# Patient Record
Sex: FEMALE | Race: OTHER | HISPANIC OR LATINO | URBAN - METROPOLITAN AREA
[De-identification: names, ages, dates, MRNs, and addresses within clinical notes are randomized per-mention and may not be internally consistent; named-entity substitution may affect disease eponyms.]

---

## 2017-02-15 ENCOUNTER — INPATIENT (INPATIENT)
Facility: HOSPITAL | Age: 77
LOS: 0 days | Discharge: ANOTHER HOSPITAL | DRG: 313 | End: 2017-02-16
Attending: INTERNAL MEDICINE | Admitting: INTERNAL MEDICINE
Payer: COMMERCIAL

## 2017-02-15 VITALS
DIASTOLIC BLOOD PRESSURE: 78 MMHG | WEIGHT: 164.91 LBS | SYSTOLIC BLOOD PRESSURE: 115 MMHG | OXYGEN SATURATION: 97 % | TEMPERATURE: 98 F | HEART RATE: 88 BPM | RESPIRATION RATE: 16 BRPM | HEIGHT: 62 IN

## 2017-02-15 DIAGNOSIS — M79.7 FIBROMYALGIA: ICD-10-CM

## 2017-02-15 DIAGNOSIS — R07.9 CHEST PAIN, UNSPECIFIED: ICD-10-CM

## 2017-02-15 DIAGNOSIS — I20.8 OTHER FORMS OF ANGINA PECTORIS: ICD-10-CM

## 2017-02-15 DIAGNOSIS — Z29.9 ENCOUNTER FOR PROPHYLACTIC MEASURES, UNSPECIFIED: ICD-10-CM

## 2017-02-15 DIAGNOSIS — K21.9 GASTRO-ESOPHAGEAL REFLUX DISEASE WITHOUT ESOPHAGITIS: ICD-10-CM

## 2017-02-15 LAB
ACETONE SERPL-MCNC: NEGATIVE — SIGNIFICANT CHANGE UP
ALBUMIN SERPL ELPH-MCNC: 3.7 G/DL — SIGNIFICANT CHANGE UP (ref 3.5–5)
ALP SERPL-CCNC: 80 U/L — SIGNIFICANT CHANGE UP (ref 40–120)
ALT FLD-CCNC: 22 U/L DA — SIGNIFICANT CHANGE UP (ref 10–60)
ANION GAP SERPL CALC-SCNC: 9 MMOL/L — SIGNIFICANT CHANGE UP (ref 5–17)
APTT BLD: 38.2 SEC — HIGH (ref 27.5–37.4)
AST SERPL-CCNC: 20 U/L — SIGNIFICANT CHANGE UP (ref 10–40)
BASOPHILS # BLD AUTO: 0.1 K/UL — SIGNIFICANT CHANGE UP (ref 0–0.2)
BASOPHILS NFR BLD AUTO: 1.3 % — SIGNIFICANT CHANGE UP (ref 0–2)
BILIRUB SERPL-MCNC: 0.6 MG/DL — SIGNIFICANT CHANGE UP (ref 0.2–1.2)
BUN SERPL-MCNC: 13 MG/DL — SIGNIFICANT CHANGE UP (ref 7–18)
CALCIUM SERPL-MCNC: 9 MG/DL — SIGNIFICANT CHANGE UP (ref 8.4–10.5)
CHLORIDE SERPL-SCNC: 107 MMOL/L — SIGNIFICANT CHANGE UP (ref 96–108)
CK MB BLD-MCNC: 1.5 % — SIGNIFICANT CHANGE UP (ref 0–3.5)
CK MB BLD-MCNC: 2.2 % — SIGNIFICANT CHANGE UP (ref 0–3.5)
CK MB CFR SERPL CALC: 1.5 NG/ML — SIGNIFICANT CHANGE UP (ref 0–3.6)
CK MB CFR SERPL CALC: 1.6 NG/ML — SIGNIFICANT CHANGE UP (ref 0–3.6)
CK SERPL-CCNC: 72 U/L — SIGNIFICANT CHANGE UP (ref 21–215)
CK SERPL-CCNC: 99 U/L — SIGNIFICANT CHANGE UP (ref 21–215)
CO2 SERPL-SCNC: 27 MMOL/L — SIGNIFICANT CHANGE UP (ref 22–31)
CREAT SERPL-MCNC: 0.68 MG/DL — SIGNIFICANT CHANGE UP (ref 0.5–1.3)
EOSINOPHIL # BLD AUTO: 0 K/UL — SIGNIFICANT CHANGE UP (ref 0–0.5)
EOSINOPHIL NFR BLD AUTO: 0.6 % — SIGNIFICANT CHANGE UP (ref 0–6)
GLUCOSE SERPL-MCNC: 92 MG/DL — SIGNIFICANT CHANGE UP (ref 70–99)
HCT VFR BLD CALC: 38 % — SIGNIFICANT CHANGE UP (ref 34.5–45)
HGB BLD-MCNC: 12.2 G/DL — SIGNIFICANT CHANGE UP (ref 11.5–15.5)
INR BLD: 0.99 RATIO — SIGNIFICANT CHANGE UP (ref 0.88–1.16)
LIDOCAIN IGE QN: 140 U/L — SIGNIFICANT CHANGE UP (ref 73–393)
LYMPHOCYTES # BLD AUTO: 1 K/UL — SIGNIFICANT CHANGE UP (ref 1–3.3)
LYMPHOCYTES # BLD AUTO: 18.7 % — SIGNIFICANT CHANGE UP (ref 13–44)
MAGNESIUM SERPL-MCNC: 2.2 MG/DL — SIGNIFICANT CHANGE UP (ref 1.8–2.4)
MCHC RBC-ENTMCNC: 31.1 PG — SIGNIFICANT CHANGE UP (ref 27–34)
MCHC RBC-ENTMCNC: 32.2 GM/DL — SIGNIFICANT CHANGE UP (ref 32–36)
MCV RBC AUTO: 96.4 FL — SIGNIFICANT CHANGE UP (ref 80–100)
MONOCYTES # BLD AUTO: 0.6 K/UL — SIGNIFICANT CHANGE UP (ref 0–0.9)
MONOCYTES NFR BLD AUTO: 10.6 % — SIGNIFICANT CHANGE UP (ref 2–14)
NEUTROPHILS # BLD AUTO: 3.8 K/UL — SIGNIFICANT CHANGE UP (ref 1.8–7.4)
NEUTROPHILS NFR BLD AUTO: 68.8 % — SIGNIFICANT CHANGE UP (ref 43–77)
NT-PROBNP SERPL-SCNC: 131 PG/ML — SIGNIFICANT CHANGE UP (ref 0–450)
PLATELET # BLD AUTO: 253 K/UL — SIGNIFICANT CHANGE UP (ref 150–400)
POTASSIUM SERPL-MCNC: 4 MMOL/L — SIGNIFICANT CHANGE UP (ref 3.5–5.3)
POTASSIUM SERPL-SCNC: 4 MMOL/L — SIGNIFICANT CHANGE UP (ref 3.5–5.3)
PROT SERPL-MCNC: 7.4 G/DL — SIGNIFICANT CHANGE UP (ref 6–8.3)
PROTHROM AB SERPL-ACNC: 11.1 SEC — SIGNIFICANT CHANGE UP (ref 10–13.1)
RBC # BLD: 3.94 M/UL — SIGNIFICANT CHANGE UP (ref 3.8–5.2)
RBC # FLD: 12 % — SIGNIFICANT CHANGE UP (ref 10.3–14.5)
SODIUM SERPL-SCNC: 143 MMOL/L — SIGNIFICANT CHANGE UP (ref 135–145)
TROPONIN I SERPL-MCNC: <0.015 NG/ML — SIGNIFICANT CHANGE UP (ref 0–0.04)
TROPONIN I SERPL-MCNC: <0.015 NG/ML — SIGNIFICANT CHANGE UP (ref 0–0.04)
WBC # BLD: 5.5 K/UL — SIGNIFICANT CHANGE UP (ref 3.8–10.5)
WBC # FLD AUTO: 5.5 K/UL — SIGNIFICANT CHANGE UP (ref 3.8–10.5)

## 2017-02-15 PROCEDURE — 99223 1ST HOSP IP/OBS HIGH 75: CPT | Mod: GC

## 2017-02-15 PROCEDURE — 99285 EMERGENCY DEPT VISIT HI MDM: CPT

## 2017-02-15 PROCEDURE — 71010: CPT | Mod: 26

## 2017-02-15 RX ORDER — METOPROLOL TARTRATE 50 MG
12.5 TABLET ORAL
Qty: 0 | Refills: 0 | Status: DISCONTINUED | OUTPATIENT
Start: 2017-02-15 | End: 2017-02-16

## 2017-02-15 RX ORDER — CLOPIDOGREL BISULFATE 75 MG/1
75 TABLET, FILM COATED ORAL DAILY
Qty: 0 | Refills: 0 | Status: DISCONTINUED | OUTPATIENT
Start: 2017-02-16 | End: 2017-02-16

## 2017-02-15 RX ORDER — ENOXAPARIN SODIUM 100 MG/ML
40 INJECTION SUBCUTANEOUS DAILY
Qty: 0 | Refills: 0 | Status: DISCONTINUED | OUTPATIENT
Start: 2017-02-15 | End: 2017-02-16

## 2017-02-15 RX ORDER — PANTOPRAZOLE SODIUM 20 MG/1
40 TABLET, DELAYED RELEASE ORAL
Qty: 0 | Refills: 0 | Status: DISCONTINUED | OUTPATIENT
Start: 2017-02-15 | End: 2017-02-16

## 2017-02-15 RX ORDER — ONDANSETRON 8 MG/1
4 TABLET, FILM COATED ORAL EVERY 6 HOURS
Qty: 0 | Refills: 0 | Status: DISCONTINUED | OUTPATIENT
Start: 2017-02-15 | End: 2017-02-16

## 2017-02-15 RX ORDER — SODIUM CHLORIDE 9 MG/ML
3 INJECTION INTRAMUSCULAR; INTRAVENOUS; SUBCUTANEOUS ONCE
Qty: 0 | Refills: 0 | Status: COMPLETED | OUTPATIENT
Start: 2017-02-15 | End: 2017-02-15

## 2017-02-15 RX ORDER — ASPIRIN/CALCIUM CARB/MAGNESIUM 324 MG
162 TABLET ORAL ONCE
Qty: 0 | Refills: 0 | Status: COMPLETED | OUTPATIENT
Start: 2017-02-15 | End: 2017-02-15

## 2017-02-15 RX ORDER — ATORVASTATIN CALCIUM 80 MG/1
40 TABLET, FILM COATED ORAL AT BEDTIME
Qty: 0 | Refills: 0 | Status: DISCONTINUED | OUTPATIENT
Start: 2017-02-15 | End: 2017-02-16

## 2017-02-15 RX ORDER — NITROGLYCERIN 6.5 MG
0.3 CAPSULE, EXTENDED RELEASE ORAL
Qty: 0 | Refills: 0 | Status: DISCONTINUED | OUTPATIENT
Start: 2017-02-15 | End: 2017-02-16

## 2017-02-15 RX ADMIN — ATORVASTATIN CALCIUM 40 MILLIGRAM(S): 80 TABLET, FILM COATED ORAL at 22:22

## 2017-02-15 RX ADMIN — SODIUM CHLORIDE 3 MILLILITER(S): 9 INJECTION INTRAMUSCULAR; INTRAVENOUS; SUBCUTANEOUS at 12:25

## 2017-02-15 RX ADMIN — Medication 162 MILLIGRAM(S): at 12:47

## 2017-02-15 RX ADMIN — Medication 12.5 MILLIGRAM(S): at 18:23

## 2017-02-15 NOTE — ED PROVIDER NOTE - PROGRESS NOTE DETAILS
pt with CP, d/w Dr. Reeves, to admit to hospitalist pt with CP, d/w Dr. Reeves, to admit to hospitalist, case d/w Dr. Mandujano

## 2017-02-15 NOTE — H&P ADULT. - HISTORY OF PRESENT ILLNESS
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 Pt also notes associated nausea and dizziness during these episodes. Pt denies cough, diaphoresis, paresthesias, fever/chills, vomiting, LE swelling, abdominal pain, urinary complaints. Pt was sent from Neurologist Office after complaint of chest pain/dyspnea persisted. In ED, pt with negative cardiac enzyme x1, given  mg x1, EKG NSR with isolate TWI in lead III.     PSH: b/l TKR 76 F from home with family with PMH CVA in her 50s, fibromylagia on lyrica, GERD on protonix, p/w c/o 1 week of L sided intermittent episodic (5-10 min episodes) radiating to neck chest pressure of moderate severity a/w dyspnea and exacerbated by exertion and somewhat improving with rest. Pt also notes associated nausea and dizziness during these episodes. Pt denies cough, diaphoresis, paresthesias, fever/chills, vomiting, LE swelling, abdominal pain, urinary complaints, recent URI/diarrheal illness. Pt was sent from Neurologist Office after complaint of chest pain/dyspnea persisted. In ED, pt with negative cardiac enzyme x1, given  mg x1, EKG NSR with isolate TWI in lead III.     PSH: b/l TKR  FH: noncontributory  SH: nonsmoker

## 2017-02-15 NOTE — ED PROVIDER NOTE - OBJECTIVE STATEMENT
75 y/o F w/ PMHx of Acid Reflux (Protonix) & Fibromyalgia (Lyrica) BIBA to the ED c/o midsternal chest pain x1 week. Pt notes nausea, SOB, intermittent diaphoresis, palpitations & states chest pain radiates to throat. Pt states she went to see Dr. Waters (Neurologist) for neck & back pain & was then sent to the ED for further evaluation. Pt denies fever, chills, recent travel, hx of similar Sx, or any other complaints. Pt is allergic to Aspirin.

## 2017-02-15 NOTE — H&P ADULT. - PROBLEM SELECTOR PLAN 1
with negative CE x1 and no consistent ischemic changes on EKG  pt denies post-prandial relation to CP. c/w plavix, bblocker, lipitor, telemonitor, ECHO to evaluate for valvulopathy and structural heart disease as well as LVEF.  serial cardiac enzymes  would pursue stress test once ruled out ACS

## 2017-02-15 NOTE — H&P ADULT. - ATTENDING COMMENTS
Patient seen and evaluated at bedside on 2/15/2017 10 pm. I agree with the resident note/outlined plan of care

## 2017-02-15 NOTE — H&P ADULT. - ASSESSMENT
76 F with hx of CVA in her 50s, fibromyalgia, p/w c/o exertional chest pain and dyspnea, admitted for stable angina and r/o ACS

## 2017-02-15 NOTE — ED PROVIDER NOTE - NS ED MD SCRIBE ATTENDING SCRIBE SECTIONS
DISPOSITION/VITAL SIGNS( Pullset)/HISTORY OF PRESENT ILLNESS/PHYSICAL EXAM/REVIEW OF SYSTEMS/PAST MEDICAL/SURGICAL/SOCIAL HISTORY

## 2017-02-15 NOTE — H&P ADULT. - MUSCULOSKELETAL
negative detailed exam no joint swelling/ROM intact/no calf tenderness/no joint warmth/no joint erythema

## 2017-02-16 ENCOUNTER — OUTPATIENT (OUTPATIENT)
Dept: INPATIENT UNIT | Facility: HOSPITAL | Age: 77
LOS: 1 days | Discharge: HOME | End: 2017-02-16
Payer: MEDICARE

## 2017-02-16 VITALS
HEART RATE: 72 BPM | DIASTOLIC BLOOD PRESSURE: 79 MMHG | OXYGEN SATURATION: 97 % | SYSTOLIC BLOOD PRESSURE: 133 MMHG | TEMPERATURE: 98 F | RESPIRATION RATE: 16 BRPM

## 2017-02-16 DIAGNOSIS — R07.9 CHEST PAIN, UNSPECIFIED: ICD-10-CM

## 2017-02-16 DIAGNOSIS — Z90.49 ACQUIRED ABSENCE OF OTHER SPECIFIED PARTS OF DIGESTIVE TRACT: Chronic | ICD-10-CM

## 2017-02-16 DIAGNOSIS — Z98.49 CATARACT EXTRACTION STATUS, UNSPECIFIED EYE: Chronic | ICD-10-CM

## 2017-02-16 DIAGNOSIS — D49.4 NEOPLASM OF UNSPECIFIED BEHAVIOR OF BLADDER: Chronic | ICD-10-CM

## 2017-02-16 DIAGNOSIS — Z98.890 OTHER SPECIFIED POSTPROCEDURAL STATES: Chronic | ICD-10-CM

## 2017-02-16 DIAGNOSIS — I20.0 UNSTABLE ANGINA: ICD-10-CM

## 2017-02-16 LAB
24R-OH-CALCIDIOL SERPL-MCNC: 21.3 NG/ML — LOW (ref 30–100)
ANION GAP SERPL CALC-SCNC: 8 MMOL/L — SIGNIFICANT CHANGE UP (ref 5–17)
BASOPHILS # BLD AUTO: 0.1 K/UL — SIGNIFICANT CHANGE UP (ref 0–0.2)
BASOPHILS NFR BLD AUTO: 0.8 % — SIGNIFICANT CHANGE UP (ref 0–2)
BUN SERPL-MCNC: 10 MG/DL — SIGNIFICANT CHANGE UP (ref 7–18)
CALCIUM SERPL-MCNC: 9 MG/DL — SIGNIFICANT CHANGE UP (ref 8.4–10.5)
CHLORIDE SERPL-SCNC: 107 MMOL/L — SIGNIFICANT CHANGE UP (ref 96–108)
CHOLEST SERPL-MCNC: 198 MG/DL — SIGNIFICANT CHANGE UP (ref 10–199)
CO2 SERPL-SCNC: 28 MMOL/L — SIGNIFICANT CHANGE UP (ref 22–31)
CREAT SERPL-MCNC: 0.6 MG/DL — SIGNIFICANT CHANGE UP (ref 0.5–1.3)
EOSINOPHIL # BLD AUTO: 0.1 K/UL — SIGNIFICANT CHANGE UP (ref 0–0.5)
EOSINOPHIL NFR BLD AUTO: 1.8 % — SIGNIFICANT CHANGE UP (ref 0–6)
FOLATE SERPL-MCNC: >20 NG/ML — SIGNIFICANT CHANGE UP (ref 4.8–24.2)
GLUCOSE SERPL-MCNC: 98 MG/DL — SIGNIFICANT CHANGE UP (ref 70–99)
HBA1C BLD-MCNC: 5.2 % — SIGNIFICANT CHANGE UP (ref 4–5.6)
HCT VFR BLD CALC: 37.7 % — SIGNIFICANT CHANGE UP (ref 34.5–45)
HDLC SERPL-MCNC: 75 MG/DL — SIGNIFICANT CHANGE UP (ref 40–125)
HGB BLD-MCNC: 12.5 G/DL — SIGNIFICANT CHANGE UP (ref 11.5–15.5)
LIPID PNL WITH DIRECT LDL SERPL: 110 MG/DL — SIGNIFICANT CHANGE UP
LYMPHOCYTES # BLD AUTO: 2.1 K/UL — SIGNIFICANT CHANGE UP (ref 1–3.3)
LYMPHOCYTES # BLD AUTO: 28.3 % — SIGNIFICANT CHANGE UP (ref 13–44)
MAGNESIUM SERPL-MCNC: 2.4 MG/DL — SIGNIFICANT CHANGE UP (ref 1.8–2.4)
MCHC RBC-ENTMCNC: 31.8 PG — SIGNIFICANT CHANGE UP (ref 27–34)
MCHC RBC-ENTMCNC: 33.1 GM/DL — SIGNIFICANT CHANGE UP (ref 32–36)
MCV RBC AUTO: 96.1 FL — SIGNIFICANT CHANGE UP (ref 80–100)
MONOCYTES # BLD AUTO: 0.7 K/UL — SIGNIFICANT CHANGE UP (ref 0–0.9)
MONOCYTES NFR BLD AUTO: 9 % — SIGNIFICANT CHANGE UP (ref 2–14)
NEUTROPHILS # BLD AUTO: 4.3 K/UL — SIGNIFICANT CHANGE UP (ref 1.8–7.4)
NEUTROPHILS NFR BLD AUTO: 60 % — SIGNIFICANT CHANGE UP (ref 43–77)
PHOSPHATE SERPL-MCNC: 3.2 MG/DL — SIGNIFICANT CHANGE UP (ref 2.5–4.5)
PLATELET # BLD AUTO: 259 K/UL — SIGNIFICANT CHANGE UP (ref 150–400)
POTASSIUM SERPL-MCNC: 3.9 MMOL/L — SIGNIFICANT CHANGE UP (ref 3.5–5.3)
POTASSIUM SERPL-SCNC: 3.9 MMOL/L — SIGNIFICANT CHANGE UP (ref 3.5–5.3)
RBC # BLD: 3.92 M/UL — SIGNIFICANT CHANGE UP (ref 3.8–5.2)
RBC # FLD: 12.2 % — SIGNIFICANT CHANGE UP (ref 10.3–14.5)
SODIUM SERPL-SCNC: 143 MMOL/L — SIGNIFICANT CHANGE UP (ref 135–145)
TOTAL CHOLESTEROL/HDL RATIO MEASUREMENT: 2.6 RATIO — LOW (ref 3.3–7.1)
TRIGL SERPL-MCNC: 66 MG/DL — SIGNIFICANT CHANGE UP (ref 10–149)
TROPONIN I SERPL-MCNC: <0.015 NG/ML — SIGNIFICANT CHANGE UP (ref 0–0.04)
TSH SERPL-MCNC: 1.67 UU/ML — SIGNIFICANT CHANGE UP (ref 0.34–4.82)
VIT B12 SERPL-MCNC: 475 PG/ML — SIGNIFICANT CHANGE UP (ref 243–894)
WBC # BLD: 7.2 K/UL — SIGNIFICANT CHANGE UP (ref 3.8–10.5)
WBC # FLD AUTO: 7.2 K/UL — SIGNIFICANT CHANGE UP (ref 3.8–10.5)

## 2017-02-16 PROCEDURE — 80061 LIPID PANEL: CPT

## 2017-02-16 PROCEDURE — 82550 ASSAY OF CK (CPK): CPT

## 2017-02-16 PROCEDURE — 85610 PROTHROMBIN TIME: CPT

## 2017-02-16 PROCEDURE — 99239 HOSP IP/OBS DSCHRG MGMT >30: CPT

## 2017-02-16 PROCEDURE — 85027 COMPLETE CBC AUTOMATED: CPT

## 2017-02-16 PROCEDURE — 82009 KETONE BODYS QUAL: CPT

## 2017-02-16 PROCEDURE — 93010 ELECTROCARDIOGRAM REPORT: CPT

## 2017-02-16 PROCEDURE — 82306 VITAMIN D 25 HYDROXY: CPT

## 2017-02-16 PROCEDURE — 80048 BASIC METABOLIC PNL TOTAL CA: CPT

## 2017-02-16 PROCEDURE — 84443 ASSAY THYROID STIM HORMONE: CPT

## 2017-02-16 PROCEDURE — 82553 CREATINE MB FRACTION: CPT

## 2017-02-16 PROCEDURE — 71045 X-RAY EXAM CHEST 1 VIEW: CPT

## 2017-02-16 PROCEDURE — 85730 THROMBOPLASTIN TIME PARTIAL: CPT

## 2017-02-16 PROCEDURE — 93306 TTE W/DOPPLER COMPLETE: CPT

## 2017-02-16 PROCEDURE — 83036 HEMOGLOBIN GLYCOSYLATED A1C: CPT

## 2017-02-16 PROCEDURE — 82607 VITAMIN B-12: CPT

## 2017-02-16 PROCEDURE — 84100 ASSAY OF PHOSPHORUS: CPT

## 2017-02-16 PROCEDURE — 83880 ASSAY OF NATRIURETIC PEPTIDE: CPT

## 2017-02-16 PROCEDURE — 83735 ASSAY OF MAGNESIUM: CPT

## 2017-02-16 PROCEDURE — 82746 ASSAY OF FOLIC ACID SERUM: CPT

## 2017-02-16 PROCEDURE — 84484 ASSAY OF TROPONIN QUANT: CPT

## 2017-02-16 PROCEDURE — 83690 ASSAY OF LIPASE: CPT

## 2017-02-16 PROCEDURE — 93005 ELECTROCARDIOGRAM TRACING: CPT

## 2017-02-16 PROCEDURE — 80053 COMPREHEN METABOLIC PANEL: CPT

## 2017-02-16 PROCEDURE — 99285 EMERGENCY DEPT VISIT HI MDM: CPT | Mod: 25

## 2017-02-16 RX ORDER — ASPIRIN/CALCIUM CARB/MAGNESIUM 324 MG
81 TABLET ORAL ONCE
Qty: 0 | Refills: 0 | Status: COMPLETED | OUTPATIENT
Start: 2017-02-16 | End: 2017-02-16

## 2017-02-16 RX ORDER — SODIUM CHLORIDE 9 MG/ML
3 INJECTION INTRAMUSCULAR; INTRAVENOUS; SUBCUTANEOUS EVERY 8 HOURS
Qty: 0 | Refills: 0 | Status: DISCONTINUED | OUTPATIENT
Start: 2017-02-16 | End: 2017-02-16

## 2017-02-16 RX ORDER — ENOXAPARIN SODIUM 100 MG/ML
0 INJECTION SUBCUTANEOUS
Qty: 0 | Refills: 0 | COMMUNITY
Start: 2017-02-16

## 2017-02-16 RX ORDER — CLOPIDOGREL BISULFATE 75 MG/1
1 TABLET, FILM COATED ORAL
Qty: 0 | Refills: 0 | COMMUNITY
Start: 2017-02-16

## 2017-02-16 RX ORDER — METOPROLOL TARTRATE 50 MG
0 TABLET ORAL
Qty: 0 | Refills: 0 | COMMUNITY
Start: 2017-02-16

## 2017-02-16 RX ORDER — ATORVASTATIN CALCIUM 80 MG/1
1 TABLET, FILM COATED ORAL
Qty: 0 | Refills: 0 | COMMUNITY
Start: 2017-02-16

## 2017-02-16 RX ORDER — SODIUM CHLORIDE 9 MG/ML
500 INJECTION INTRAMUSCULAR; INTRAVENOUS; SUBCUTANEOUS
Qty: 0 | Refills: 0 | Status: DISCONTINUED | OUTPATIENT
Start: 2017-02-16 | End: 2017-02-16

## 2017-02-16 RX ORDER — NITROGLYCERIN 6.5 MG
0 CAPSULE, EXTENDED RELEASE ORAL
Qty: 0 | Refills: 0 | COMMUNITY
Start: 2017-02-16

## 2017-02-16 RX ADMIN — Medication 81 MILLIGRAM(S): at 15:40

## 2017-02-16 RX ADMIN — PANTOPRAZOLE SODIUM 40 MILLIGRAM(S): 20 TABLET, DELAYED RELEASE ORAL at 06:18

## 2017-02-16 RX ADMIN — Medication 12.5 MILLIGRAM(S): at 06:19

## 2017-02-16 RX ADMIN — Medication 100 MILLIGRAM(S): at 12:32

## 2017-02-16 RX ADMIN — SODIUM CHLORIDE 75 MILLILITER(S): 9 INJECTION INTRAMUSCULAR; INTRAVENOUS; SUBCUTANEOUS at 18:18

## 2017-02-16 NOTE — DISCHARGE NOTE ADULT - PATIENT PORTAL LINK FT
“You can access the Follow[facility] Patient Portal, offered by [facility], by registering with the following website: [email or web address]”

## 2017-02-16 NOTE — TRANSFER ACCEPTANCE NOTE - PSH
Bladder tumor  surgically removed; benign  H/O varicose vein stripping    History of cataract surgery    History of cholecystectomy

## 2017-02-16 NOTE — TRANSFER ACCEPTANCE NOTE - HISTORY OF PRESENT ILLNESS
76 year old female with Aspirin allergy with GERD, fibromyalgia who presented to [facility] ED 2/15/17 complaining of chest pain and SOB for the past 1 week. Patient describes her symptoms as left sided, nonradiating chest pain with associated SOB and nausea, dizziness at different levels of exertion, only mildly relieved with rest. Patient R/O MI with negative troponins and recommended for cath.   In light of patients symptoms there is high suspicion for CAD. Patient is now transferred to [facility] 2/16/17 for a cardiac catheterization with possible PTCA/stent.   Meds at [facility]: Lovenox, Lipitor 80mg po bedtime, Protonix 40mg po daily, Metoprolol, Lyrica 100mg po daily 76 year old female with GERD, fibromyalgia who presented to [facility] ED 2/15/17 complaining of chest pain and SOB for the past 1 week. Patient describes her symptoms as left sided, nonradiating chest pain with associated SOB and nausea, dizziness at different levels of exertion, only mildly relieved with rest. Patient R/O MI with negative troponins and recommended for cath.   In light of patients symptoms there is high suspicion for CAD. Patient is now transferred to [facility] 2/16/17 for a cardiac catheterization with possible PTCA/stent.   Meds at [facility]: Lovenox, Lipitor 80mg po bedtime, Protonix 40mg po daily, Metoprolol, Lyrica 100mg po daily    **OF NOTE: patient admits to allergy to Aspirin since childhood, does not know reaction; however, upon [facility] chart review patient did receive a dose of chewable ASA 162mg po x1 at noon on 2/15/17 without any reported reaction. Case discussed with Dr Ewing and patient who agrees to take another dose of ASA 81mg po x1 pre cath and will monitor 76 year old female with GERD, fibromyalgia who presented to [facility] ED 2/15/17 complaining of chest pain and SOB for the past 1 week. Patient describes her symptoms as left sided, nonradiating chest pain with associated SOB and nausea, dizziness at different levels of exertion, only mildly relieved with rest. Patient R/O MI with negative troponins and recommended for cath.   In light of patients symptoms there is high suspicion for CAD. Patient is now transferred to [facility] 2/16/17 for a cardiac catheterization with possible PTCA/stent.   Meds at [facility]: Lovenox, Lipitor 80mg po bedtime, Protonix 40mg po daily, Metoprolol, Lyrica 100mg po daily    **OF NOTE: patient admits to allergy to Aspirin since childhood, does not know reaction; however, upon [facility] chart review patient did receive a dose of chewable ASA 162mg po x1 at noon on 2/15/17 without any reported reaction. Case discussed with Dr Ewing and further discussed with patient via pacific  [id number] who agrees to take another dose of ASA 81mg po x1 pre cath as she confirms she did not have an adverse/allergic reaction yesterday when taken. Will continue to monitor

## 2017-02-16 NOTE — DISCHARGE NOTE ADULT - CARE PROVIDER_API CALL
Kannan Reeves (), Medicine  [address] [address] [place] [id number]  Phone: [phone]  Fax: [phone]

## 2017-02-16 NOTE — DISCHARGE NOTE ADULT - HOSPITAL COURSE
76 year old female with GERD, fibromyalgia who presented to [facility] ED 2/15/17 complaining of chest pain and SOB for the past 1 week. Patient describes her symptoms as left sided, nonradiating chest pain with associated SOB and nausea, dizziness at different levels of exertion, only mildly relieved with rest. Patient R/O MI with negative troponins and recommended for cath.   In light of patients symptoms there is high suspicion for CAD. Patient is now transferred to [facility] 2/16/17 for a cardiac catheterization with possible PTCA/stent.   Underwent a Cardiac catheterization via right radial artery access revealing normal coronary arteries, EF 55%. Patient chest pain free and stable for discharge to home with outpatient follow up if right radial site remains stable with ambulation.

## 2017-02-16 NOTE — DISCHARGE NOTE ADULT - MEDICATION SUMMARY - MEDICATIONS TO TAKE
I will START or STAY ON the medications listed below when I get home from the hospital:    nitroglycerin  --     -- Indication: For Chest pressure    enoxaparin  --     -- Indication: For DVT PPx    Lyrica 100 mg oral capsule  -- 1 cap(s) by mouth once a day  -- Indication: For Fibromyalgia    atorvastatin 40 mg oral tablet  -- 1 tab(s) by mouth once a day (at bedtime)  -- Indication: For Cardioprotective    clopidogrel 75 mg oral tablet  -- 1 tab(s) by mouth once a day  -- Indication: For Chest pain    metoprolol  --     -- Indication: For Chest pain    Protonix 40 mg oral delayed release tablet  -- 1 tab(s) by mouth once a day  -- Indication: For GERD (gastroesophageal reflux disease) I will START or STAY ON the medications listed below when I get home from the hospital:    Lyrica 100 mg oral capsule  -- 1 cap(s) by mouth once a day  -- Indication: For Fibromyalgia    Protonix 40 mg oral delayed release tablet  -- 1 tab(s) by mouth once a day  -- Indication: For GERD (gastroesophageal reflux disease)

## 2017-02-16 NOTE — DISCHARGE NOTE ADULT - CARE PLAN
Principal Discharge DX:	Atypical chest pain  Goal:	remain chest pain free  Instructions for follow-up, activity and diet:	Follow up with Dr Reeves in 1week  low salt, low fat, low cholesterol  Secondary Diagnosis:	Fibromyalgia  Instructions for follow-up, activity and diet:	continue Lyrica

## 2017-02-16 NOTE — DISCHARGE NOTE ADULT - INSTRUCTIONS
No heavy lifting greater than 5-10 pounds x one week.  No strenuous activity x 3 weeks.  Monitor site of procedure and notify your doctor for any redness/swelling/discharge. Call MD for return appt., call if any problems with Right wrist, or any return of symptoms

## 2017-02-16 NOTE — DISCHARGE NOTE ADULT - CARE PLAN
Principal Discharge DX:	Chest pain  Goal:	to find the cause  Instructions for follow-up, activity and diet:	continue clopidogrel as allergic to aspirin, statin and metoprolol. Follow up with Dr Ewing at [facility] for cardiac cath due to concern for NSTEMI. Proceed according to the result.  Secondary Diagnosis:	GERD (gastroesophageal reflux disease)  Instructions for follow-up, activity and diet:	continue pantoprazole.  Secondary Diagnosis:	Fibromyalgia  Instructions for follow-up, activity and diet:	continue Lyrica. Principal Discharge DX:	Chest pain  Goal:	to find the cause  Instructions for follow-up, activity and diet:	continue clopidogrel as allergic to aspirin, statin and metoprolol. Follow up with Dr Ewing at [facility] for cardiac cath due to concern for NSTEMI. Proceed according to the result.  Secondary Diagnosis:	GERD (gastroesophageal reflux disease)  Instructions for follow-up, activity and diet:	continue pantoprazole.  Secondary Diagnosis:	Fibromyalgia  Instructions for follow-up, activity and diet:	continue Lyrica. Principal Discharge DX:	Chest pain  Goal:	to find the cause  Instructions for follow-up, activity and diet:	continue clopidogrel as allergic to aspirin, statin and metoprolol. Follow up with Dr Ewing at [facility] for cardiac cath due to concern for NSTEMI. Proceed according to the result.  Secondary Diagnosis:	GERD (gastroesophageal reflux disease)  Instructions for follow-up, activity and diet:	continue pantoprazole.  Secondary Diagnosis:	Fibromyalgia  Instructions for follow-up, activity and diet:	continue Lyrica. Principal Discharge DX:	Chest pain  Goal:	to find the cause  Instructions for follow-up, activity and diet:	continue clopidogrel as allergic to aspirin, statin and metoprolol. Follow up with Dr Ewing at [facility] for cardiac cath due to concern for NSTEMI. Proceed according to the result.  Secondary Diagnosis:	GERD (gastroesophageal reflux disease)  Instructions for follow-up, activity and diet:	continue pantoprazole.  Secondary Diagnosis:	Fibromyalgia  Instructions for follow-up, activity and diet:	continue Lyrica.

## 2017-02-16 NOTE — ACUTE INTERFACILITY TRANSFER NOTE - PLAN OF CARE
continue clopidogrel as allergic to aspirin, statin and metoprolol. Follow up with Dr Ewing at [facility] for cardiac cath due to concern for NSTEMI. Proceed according to the result. to find the cause and treat it continue pantoprazole continue lyrica

## 2017-02-16 NOTE — DISCHARGE NOTE ADULT - MEDICATION SUMMARY - MEDICATIONS TO TAKE
I will START or STAY ON the medications listed below when I get home from the hospital:    Lyrica 100 mg oral capsule  -- 1 cap(s) by mouth once a day  -- Indication: For Fibromyalgia    Protonix 40 mg oral delayed release tablet  -- 1 tab(s) by mouth once a day  -- Indication: For GERD (gastroesophageal reflux disease)

## 2017-02-16 NOTE — ACUTE INTERFACILITY TRANSFER NOTE - PROVIDER TOKENS
FREE:[LAST:[Ewing],PHONE:[(   )    -],FAX:[(   )    -],ADDRESS:[from [facility]]]

## 2017-02-16 NOTE — TRANSFER ACCEPTANCE NOTE - ASSESSMENT
75 yo female who is transferred to [facility] from [facility] after complaints of chest pain/SOB and R/O MI.

## 2017-02-16 NOTE — DISCHARGE NOTE ADULT - PLAN OF CARE
continue Lyrica. continue pantoprazole. continue clopidogrel as allergic to aspirin, statin and metoprolol. Follow up with Dr Ewing at [facility] for cardiac cath due to concern for NSTEMI. Proceed according to the result. to find the cause

## 2017-02-16 NOTE — DISCHARGE NOTE ADULT - HOSPITAL COURSE
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 Pt also notes associated nausea and dizziness during these episodes. Pt denies cough, diaphoresis, paresthesias, fever/chills, vomiting, LE swelling, abdominal pain, urinary complaints, recent URI/diarrheal illness. Pt was sent from Neurologist Office after complaint of chest pain/dyspnea persisted. In ED, pt with negative cardiac enzyme x1, given  mg x1, EKG NSR with isolate TWI in lead III.   Admitted to intermittent and persistent chest pressure. Cardiac enzyme 3 set within normal limit. EKG with notch QRS in V2 likely from delayed conduction and left axis deviation  but not seen on repeat EKG. CXR WNL with no infiltrate. Cardiac echo was still pending. Cardiologist Dr Altamirano seen the patient and decided to do cardiac cath since risk for NSETMI is high due to the nature of the pain. Discussed with medical attending and pt will get transfer to [facility] under Dr Ewing care for further management and cardiac cath. 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 Pt also notes associated nausea and dizziness during these episodes. Pt denies cough, diaphoresis, paresthesias, fever/chills, vomiting, LE swelling, abdominal pain, urinary complaints, recent URI/diarrheal illness. Pt was sent from Neurologist Office after complaint of chest pain/dyspnea persisted. In ED, pt with negative cardiac enzyme x1, given  mg x1, EKG NSR with isolate TWI in lead III.   Admitted to intermittent and persistent chest pressure. Cardiac enzyme 3 set within normal limit. EKG with notch QRS in V2 likely from delayed conduction and left axis deviation  but not seen on repeat EKG. CXR WNL with no infiltrate. Cardiac echo was still pending. Cardiologist Dr Altamirano seen the patient and decided to do cardiac cath since risk for NSETMI is high due to the nature of the pain. Discussed with medical attending and pt will get transfer to [facility] under Dr Ewing care for further management and cardiac cath.     Med Attd Note/ Discharge day note (late entry)  Patient seen and evaluated at bedside on the day of discharge. There were no acute events on telemetry, nausea/vomiting/diaphoresis. Patient with typical symptoms of exertional chest pain/dyspnea concerning for anginal equivalent. She had recurrent episodes of chest pain overnight as well. Case reviewed with Cardiology with concern for unstable angina. Transfer initiated to [facility] for cardiac catheterization. Patient was in agreement with plan.   35 min spent on exam, discussion, and coordination of care on day of transfer

## 2017-02-16 NOTE — TRANSFER ACCEPTANCE NOTE - PROBLEM SELECTOR PLAN 1
cardiac cath  patients unknown allergy to ASA discussed with attending, Dr. DANIEL Ewing cardiac cath  Aspirin 81mg po x1 pre cath, will monitor

## 2017-02-16 NOTE — DISCHARGE NOTE ADULT - PROVIDER TOKENS
FREE:[LAST:[Ewing],PHONE:[(   )    -],FAX:[(   )    -],ADDRESS:[From [facility]]]

## 2017-02-16 NOTE — DISCHARGE NOTE ADULT - PATIENT PORTAL LINK FT
“You can access the FollowHealth Patient Portal, offered by [facility], by registering with the following website: [email or web address]”

## 2017-02-16 NOTE — DISCHARGE NOTE ADULT - PLAN OF CARE
remain chest pain free Follow up with Dr Reeves in 1week  low salt, low fat, low cholesterol continue Lyrica

## 2017-02-24 DIAGNOSIS — R07.9 CHEST PAIN, UNSPECIFIED: ICD-10-CM

## 2017-02-24 DIAGNOSIS — K21.9 GASTRO-ESOPHAGEAL REFLUX DISEASE WITHOUT ESOPHAGITIS: ICD-10-CM

## 2017-02-24 DIAGNOSIS — M79.7 FIBROMYALGIA: ICD-10-CM

## 2017-02-24 DIAGNOSIS — I10 ESSENTIAL (PRIMARY) HYPERTENSION: ICD-10-CM

## 2017-02-24 DIAGNOSIS — Z86.73 PERSONAL HISTORY OF TRANSIENT ISCHEMIC ATTACK (TIA), AND CEREBRAL INFARCTION WITHOUT RESIDUAL DEFICITS: ICD-10-CM

## 2018-10-06 ENCOUNTER — INPATIENT (INPATIENT)
Facility: HOSPITAL | Age: 78
LOS: 10 days | Discharge: HOME | DRG: 871 | End: 2018-10-17
Attending: INTERNAL MEDICINE | Admitting: INTERNAL MEDICINE
Payer: COMMERCIAL

## 2018-10-06 VITALS
SYSTOLIC BLOOD PRESSURE: 126 MMHG | TEMPERATURE: 100 F | RESPIRATION RATE: 18 BRPM | HEART RATE: 112 BPM | DIASTOLIC BLOOD PRESSURE: 65 MMHG | OXYGEN SATURATION: 100 %

## 2018-10-06 DIAGNOSIS — Z98.49 CATARACT EXTRACTION STATUS, UNSPECIFIED EYE: Chronic | ICD-10-CM

## 2018-10-06 DIAGNOSIS — Z23 ENCOUNTER FOR IMMUNIZATION: ICD-10-CM

## 2018-10-06 DIAGNOSIS — D68.1 HEREDITARY FACTOR XI DEFICIENCY: ICD-10-CM

## 2018-10-06 DIAGNOSIS — Z90.49 ACQUIRED ABSENCE OF OTHER SPECIFIED PARTS OF DIGESTIVE TRACT: Chronic | ICD-10-CM

## 2018-10-06 DIAGNOSIS — D49.4 NEOPLASM OF UNSPECIFIED BEHAVIOR OF BLADDER: Chronic | ICD-10-CM

## 2018-10-06 DIAGNOSIS — E86.1 HYPOVOLEMIA: ICD-10-CM

## 2018-10-06 DIAGNOSIS — Z98.890 OTHER SPECIFIED POSTPROCEDURAL STATES: Chronic | ICD-10-CM

## 2018-10-06 DIAGNOSIS — E43 UNSPECIFIED SEVERE PROTEIN-CALORIE MALNUTRITION: ICD-10-CM

## 2018-10-06 DIAGNOSIS — A41.9 SEPSIS, UNSPECIFIED ORGANISM: ICD-10-CM

## 2018-10-06 DIAGNOSIS — K21.9 GASTRO-ESOPHAGEAL REFLUX DISEASE WITHOUT ESOPHAGITIS: ICD-10-CM

## 2018-10-06 DIAGNOSIS — M79.7 FIBROMYALGIA: ICD-10-CM

## 2018-10-06 DIAGNOSIS — D63.8 ANEMIA IN OTHER CHRONIC DISEASES CLASSIFIED ELSEWHERE: ICD-10-CM

## 2018-10-06 DIAGNOSIS — A04.72 ENTEROCOLITIS DUE TO CLOSTRIDIUM DIFFICILE, NOT SPECIFIED AS RECURRENT: ICD-10-CM

## 2018-10-06 LAB
ALBUMIN SERPL ELPH-MCNC: 3.1 G/DL — LOW (ref 3.5–5)
ALP SERPL-CCNC: 54 U/L — SIGNIFICANT CHANGE UP (ref 40–120)
ALT FLD-CCNC: 14 U/L DA — SIGNIFICANT CHANGE UP (ref 10–60)
ANION GAP SERPL CALC-SCNC: 9 MMOL/L — SIGNIFICANT CHANGE UP (ref 5–17)
AST SERPL-CCNC: 24 U/L — SIGNIFICANT CHANGE UP (ref 10–40)
BILIRUB DIRECT SERPL-MCNC: 0.1 MG/DL — SIGNIFICANT CHANGE UP (ref 0–0.2)
BILIRUB INDIRECT FLD-MCNC: 0.4 MG/DL — SIGNIFICANT CHANGE UP (ref 0.2–1)
BILIRUB SERPL-MCNC: 0.5 MG/DL — SIGNIFICANT CHANGE UP (ref 0.2–1.2)
BUN SERPL-MCNC: 6 MG/DL — LOW (ref 7–18)
CALCIUM SERPL-MCNC: 8.7 MG/DL — SIGNIFICANT CHANGE UP (ref 8.4–10.5)
CHLORIDE SERPL-SCNC: 101 MMOL/L — SIGNIFICANT CHANGE UP (ref 96–108)
CO2 SERPL-SCNC: 24 MMOL/L — SIGNIFICANT CHANGE UP (ref 22–31)
CREAT SERPL-MCNC: 0.59 MG/DL — SIGNIFICANT CHANGE UP (ref 0.5–1.3)
GLUCOSE SERPL-MCNC: 112 MG/DL — HIGH (ref 70–99)
HCT VFR BLD CALC: 34 % — LOW (ref 34.5–45)
HGB BLD-MCNC: 10.8 G/DL — LOW (ref 11.5–15.5)
LACTATE SERPL-SCNC: 1.4 MMOL/L — SIGNIFICANT CHANGE UP (ref 0.7–2)
LIDOCAIN IGE QN: 75 U/L — SIGNIFICANT CHANGE UP (ref 73–393)
LYMPHOCYTES # BLD AUTO: 3 % — LOW (ref 13–44)
MAGNESIUM SERPL-MCNC: 1.8 MG/DL — SIGNIFICANT CHANGE UP (ref 1.6–2.6)
MCHC RBC-ENTMCNC: 29.4 PG — SIGNIFICANT CHANGE UP (ref 27–34)
MCHC RBC-ENTMCNC: 31.8 GM/DL — LOW (ref 32–36)
MCV RBC AUTO: 92.5 FL — SIGNIFICANT CHANGE UP (ref 80–100)
MONOCYTES NFR BLD AUTO: 9 % — SIGNIFICANT CHANGE UP (ref 2–14)
NEUTROPHILS NFR BLD AUTO: 85 % — HIGH (ref 43–77)
PHOSPHATE SERPL-MCNC: 2.5 MG/DL — SIGNIFICANT CHANGE UP (ref 2.5–4.5)
PLATELET # BLD AUTO: 360 K/UL — SIGNIFICANT CHANGE UP (ref 150–400)
POTASSIUM SERPL-MCNC: 3.6 MMOL/L — SIGNIFICANT CHANGE UP (ref 3.5–5.3)
POTASSIUM SERPL-SCNC: 3.6 MMOL/L — SIGNIFICANT CHANGE UP (ref 3.5–5.3)
PROT SERPL-MCNC: 6.9 G/DL — SIGNIFICANT CHANGE UP (ref 6–8.3)
RBC # BLD: 3.68 M/UL — LOW (ref 3.8–5.2)
RBC # FLD: 15.2 % — HIGH (ref 10.3–14.5)
SODIUM SERPL-SCNC: 134 MMOL/L — LOW (ref 135–145)
WBC # BLD: 23 K/UL — HIGH (ref 3.8–10.5)
WBC # FLD AUTO: 23 K/UL — HIGH (ref 3.8–10.5)

## 2018-10-06 PROCEDURE — 74177 CT ABD & PELVIS W/CONTRAST: CPT | Mod: 26

## 2018-10-06 RX ORDER — VANCOMYCIN HCL 1 G
125 VIAL (EA) INTRAVENOUS ONCE
Qty: 0 | Refills: 0 | Status: COMPLETED | OUTPATIENT
Start: 2018-10-06 | End: 2018-10-06

## 2018-10-06 RX ORDER — METRONIDAZOLE 500 MG
500 TABLET ORAL ONCE
Qty: 0 | Refills: 0 | Status: COMPLETED | OUTPATIENT
Start: 2018-10-06 | End: 2018-10-06

## 2018-10-06 RX ORDER — SODIUM CHLORIDE 9 MG/ML
1000 INJECTION INTRAMUSCULAR; INTRAVENOUS; SUBCUTANEOUS ONCE
Qty: 0 | Refills: 0 | Status: COMPLETED | OUTPATIENT
Start: 2018-10-06 | End: 2018-10-06

## 2018-10-06 RX ADMIN — Medication 100 MILLIGRAM(S): at 23:57

## 2018-10-06 RX ADMIN — Medication 125 MILLIGRAM(S): at 23:56

## 2018-10-06 RX ADMIN — SODIUM CHLORIDE 2000 MILLILITER(S): 9 INJECTION INTRAMUSCULAR; INTRAVENOUS; SUBCUTANEOUS at 20:09

## 2018-10-06 NOTE — ED ADULT NURSE NOTE - NSIMPLEMENTINTERV_GEN_ALL_ED
Implemented All Universal Safety Interventions:  [place] to call system. Call bell, personal items and telephone within reach. Instruct patient to call for assistance. Room bathroom lighting operational. Non-slip footwear when patient is off stretcher. Physically safe environment: no spills, clutter or unnecessary equipment. Stretcher in lowest position, wheels locked, appropriate side rails in place.

## 2018-10-06 NOTE — ED PROVIDER NOTE - OBJECTIVE STATEMENT
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 Pt had GI bleed end of 8/2018 and was admitted to hospital in [place] for 11d and had capsule endoscopy that showed ulcerations of duodenum. GIB resolved and since discharge from hospital has had persistent diarrhea a/w intermittent cramping gaseous abd pain and distention and measured fever. Few episodes nbnb emesis intermittently. Has been put on multiple courses flagyl since symptom onset, which seems to improve symptoms but then they return. Pt/family deny prior episodes, other recent abx, sick contacts, dizz, syncope, black stool, brbpr, dm, hiv, immune compromise

## 2018-10-06 NOTE — ED ADULT TRIAGE NOTE - CHIEF COMPLAINT QUOTE
Patient has been on multiple antibiotics since that time.  PCP sent specimen for C-DIFF, no results as yet.  Originally rectal bleeding

## 2018-10-06 NOTE — ED PROVIDER NOTE - MEDICAL DECISION MAKING DETAILS
very likely C. diff colitis, r/o other infectious diarrhea GIB electrolyte abnormalities. plan: cbc cmp mag phos c. diff pcr stool culture empiric c. diff tx ivf admit

## 2018-10-06 NOTE — ED PROVIDER NOTE - NS ED ROS FT
Constitutional: +Fever, - unexplained weight loss  Eyes: - Discharge, - Irritation, - Redness, - Visual changes, - Light sensitivity  EARS: - Ear Pain, - hearing loss  NOSE: - Congestion, - epistaxis  MOUTH/THROAT: - Vocal Changes, - Drooling, - Sore throat  NECK: - Lumps, - Stiffness, - Pain  CV: - Palpitations, - Chest Pain, - Edema   RESP:  - Cough, - Shortness of Breath, - Dyspnea on Exertion, - Wheezing  GI: + Diarrhea, - Constipation, - Bloody stools,+Vomiting, +Abdominal Pain  : - Dysuria, -Frequency, - Hematuria  MSK: - Myalgias, - focal weakness, - Injuries  SKIN: - Color change, - Rash  NEURO: - Change in behavior, - Dec. Alertness, - Headache, - Change in speech, - Seizure-like activity

## 2018-10-06 NOTE — ED PROVIDER NOTE - PHYSICAL EXAMINATION
PE:   GEN: Awake, alert, oriented, interactive, NAD  HEAD: Atraumatic, normocephalic  EYES: Sclera white, conjunctiva pink, PERRLA  CARDIAC: tachy regular rate, S1,S2, no murmur/rub/gallop. Strong central and peripheral pulses, Brisk cap refill, no evident pedal edema.   RESP: Normal respiratory rate and effort, no resp distress, lungs cta b/l without accessory muscle use, wheeze, rhonchi, or rales.   ABD: soft, non-tender, nondistended  NEURO: AOx3, CN II-XII grossly intact without focal deficits   MSK: Moving all extremities spontaneously, no apparent deformities  SKIN: warm, dry

## 2018-10-07 DIAGNOSIS — A04.72 ENTEROCOLITIS DUE TO CLOSTRIDIUM DIFFICILE, NOT SPECIFIED AS RECURRENT: ICD-10-CM

## 2018-10-07 DIAGNOSIS — D66 HEREDITARY FACTOR VIII DEFICIENCY: ICD-10-CM

## 2018-10-07 DIAGNOSIS — D64.9 ANEMIA, UNSPECIFIED: ICD-10-CM

## 2018-10-07 DIAGNOSIS — Z29.9 ENCOUNTER FOR PROPHYLACTIC MEASURES, UNSPECIFIED: ICD-10-CM

## 2018-10-07 DIAGNOSIS — A41.9 SEPSIS, UNSPECIFIED ORGANISM: ICD-10-CM

## 2018-10-07 PROBLEM — M79.7 FIBROMYALGIA: Chronic | Status: ACTIVE | Noted: 2017-02-16

## 2018-10-07 PROBLEM — N20.0 CALCULUS OF KIDNEY: Chronic | Status: ACTIVE | Noted: 2017-02-16

## 2018-10-07 PROBLEM — K21.9 GASTRO-ESOPHAGEAL REFLUX DISEASE WITHOUT ESOPHAGITIS: Chronic | Status: ACTIVE | Noted: 2017-02-16

## 2018-10-07 LAB
ALBUMIN SERPL ELPH-MCNC: 2.6 G/DL — LOW (ref 3.5–5)
ALP SERPL-CCNC: 45 U/L — SIGNIFICANT CHANGE UP (ref 40–120)
ALT FLD-CCNC: 13 U/L DA — SIGNIFICANT CHANGE UP (ref 10–60)
ANION GAP SERPL CALC-SCNC: 8 MMOL/L — SIGNIFICANT CHANGE UP (ref 5–17)
ANION GAP SERPL CALC-SCNC: 8 MMOL/L — SIGNIFICANT CHANGE UP (ref 5–17)
AST SERPL-CCNC: 11 U/L — SIGNIFICANT CHANGE UP (ref 10–40)
BASOPHILS # BLD AUTO: 0.1 K/UL — SIGNIFICANT CHANGE UP (ref 0–0.2)
BASOPHILS NFR BLD AUTO: 0.3 % — SIGNIFICANT CHANGE UP (ref 0–2)
BILIRUB SERPL-MCNC: 0.4 MG/DL — SIGNIFICANT CHANGE UP (ref 0.2–1.2)
BUN SERPL-MCNC: 4 MG/DL — LOW (ref 7–18)
BUN SERPL-MCNC: 5 MG/DL — LOW (ref 7–18)
C DIFF BY PCR RESULT: DETECTED
C DIFF TOX GENS STL QL NAA+PROBE: SIGNIFICANT CHANGE UP
CALCIUM SERPL-MCNC: 8.1 MG/DL — LOW (ref 8.4–10.5)
CALCIUM SERPL-MCNC: 8.2 MG/DL — LOW (ref 8.4–10.5)
CHLORIDE SERPL-SCNC: 107 MMOL/L — SIGNIFICANT CHANGE UP (ref 96–108)
CHLORIDE SERPL-SCNC: 107 MMOL/L — SIGNIFICANT CHANGE UP (ref 96–108)
CHOLEST SERPL-MCNC: 128 MG/DL — SIGNIFICANT CHANGE UP (ref 10–199)
CO2 SERPL-SCNC: 22 MMOL/L — SIGNIFICANT CHANGE UP (ref 22–31)
CO2 SERPL-SCNC: 25 MMOL/L — SIGNIFICANT CHANGE UP (ref 22–31)
CREAT SERPL-MCNC: 0.46 MG/DL — LOW (ref 0.5–1.3)
CREAT SERPL-MCNC: 0.46 MG/DL — LOW (ref 0.5–1.3)
CULTURE RESULTS: SIGNIFICANT CHANGE UP
EOSINOPHIL # BLD AUTO: 0 K/UL — SIGNIFICANT CHANGE UP (ref 0–0.5)
EOSINOPHIL NFR BLD AUTO: 0.1 % — SIGNIFICANT CHANGE UP (ref 0–6)
FERRITIN SERPL-MCNC: 48 NG/ML — SIGNIFICANT CHANGE UP (ref 15–150)
FOLATE SERPL-MCNC: 17.9 NG/ML — SIGNIFICANT CHANGE UP
GLUCOSE SERPL-MCNC: 112 MG/DL — HIGH (ref 70–99)
GLUCOSE SERPL-MCNC: 96 MG/DL — SIGNIFICANT CHANGE UP (ref 70–99)
GRAM STN FLD: SIGNIFICANT CHANGE UP
HBA1C BLD-MCNC: 5.2 % — SIGNIFICANT CHANGE UP (ref 4–5.6)
HCT VFR BLD CALC: 28.4 % — LOW (ref 34.5–45)
HDLC SERPL-MCNC: 55 MG/DL — SIGNIFICANT CHANGE UP
HGB BLD-MCNC: 9.1 G/DL — LOW (ref 11.5–15.5)
IRON SATN MFR SERPL: 14 UG/DL — LOW (ref 40–150)
IRON SATN MFR SERPL: 6 % — LOW (ref 15–50)
LACTATE SERPL-SCNC: 0.6 MMOL/L — LOW (ref 0.7–2)
LDH SERPL L TO P-CCNC: 174 U/L — SIGNIFICANT CHANGE UP (ref 120–225)
LIPID PNL WITH DIRECT LDL SERPL: 61 MG/DL — SIGNIFICANT CHANGE UP
LYMPHOCYTES # BLD AUTO: 1 K/UL — SIGNIFICANT CHANGE UP (ref 1–3.3)
LYMPHOCYTES # BLD AUTO: 5.2 % — LOW (ref 13–44)
MAGNESIUM SERPL-MCNC: 2 MG/DL — SIGNIFICANT CHANGE UP (ref 1.6–2.6)
MCHC RBC-ENTMCNC: 29.8 PG — SIGNIFICANT CHANGE UP (ref 27–34)
MCHC RBC-ENTMCNC: 32.2 GM/DL — SIGNIFICANT CHANGE UP (ref 32–36)
MCV RBC AUTO: 92.7 FL — SIGNIFICANT CHANGE UP (ref 80–100)
MONOCYTES # BLD AUTO: 1.2 K/UL — HIGH (ref 0–0.9)
MONOCYTES NFR BLD AUTO: 6.4 % — SIGNIFICANT CHANGE UP (ref 2–14)
NEUTROPHILS # BLD AUTO: 16.8 K/UL — HIGH (ref 1.8–7.4)
NEUTROPHILS NFR BLD AUTO: 88 % — HIGH (ref 43–77)
OB PNL STL: NEGATIVE — SIGNIFICANT CHANGE UP
PHOSPHATE SERPL-MCNC: 3 MG/DL — SIGNIFICANT CHANGE UP (ref 2.5–4.5)
PLATELET # BLD AUTO: 294 K/UL — SIGNIFICANT CHANGE UP (ref 150–400)
POTASSIUM SERPL-MCNC: 2.7 MMOL/L — CRITICAL LOW (ref 3.5–5.3)
POTASSIUM SERPL-MCNC: 3.6 MMOL/L — SIGNIFICANT CHANGE UP (ref 3.5–5.3)
POTASSIUM SERPL-SCNC: 2.7 MMOL/L — CRITICAL LOW (ref 3.5–5.3)
POTASSIUM SERPL-SCNC: 3.6 MMOL/L — SIGNIFICANT CHANGE UP (ref 3.5–5.3)
PROT SERPL-MCNC: 6.1 G/DL — SIGNIFICANT CHANGE UP (ref 6–8.3)
RBC # BLD: 3.06 M/UL — LOW (ref 3.8–5.2)
RBC # BLD: 3.1 M/UL — LOW (ref 3.8–5.2)
RBC # FLD: 15.1 % — HIGH (ref 10.3–14.5)
RETICS #: 66.6 K/UL — SIGNIFICANT CHANGE UP (ref 25–125)
RETICS/RBC NFR: 2.2 % — SIGNIFICANT CHANGE UP (ref 0.5–2.5)
SODIUM SERPL-SCNC: 137 MMOL/L — SIGNIFICANT CHANGE UP (ref 135–145)
SODIUM SERPL-SCNC: 140 MMOL/L — SIGNIFICANT CHANGE UP (ref 135–145)
SPECIMEN SOURCE: SIGNIFICANT CHANGE UP
SPECIMEN SOURCE: SIGNIFICANT CHANGE UP
TIBC SERPL-MCNC: 215 UG/DL — LOW (ref 250–450)
TOTAL CHOLESTEROL/HDL RATIO MEASUREMENT: 2.3 RATIO — LOW (ref 3.3–7.1)
TRIGL SERPL-MCNC: 61 MG/DL — SIGNIFICANT CHANGE UP (ref 10–149)
UIBC SERPL-MCNC: 201 UG/DL — SIGNIFICANT CHANGE UP (ref 110–370)
VIT B12 SERPL-MCNC: 435 PG/ML — SIGNIFICANT CHANGE UP (ref 232–1245)
WBC # BLD: 19.1 K/UL — HIGH (ref 3.8–10.5)
WBC # FLD AUTO: 19.1 K/UL — HIGH (ref 3.8–10.5)

## 2018-10-07 PROCEDURE — 99285 EMERGENCY DEPT VISIT HI MDM: CPT | Mod: 25

## 2018-10-07 RX ORDER — METRONIDAZOLE 500 MG
500 TABLET ORAL EVERY 8 HOURS
Qty: 0 | Refills: 0 | Status: DISCONTINUED | OUTPATIENT
Start: 2018-10-07 | End: 2018-10-09

## 2018-10-07 RX ORDER — LACTOBACILLUS ACIDOPHILUS 100MM CELL
1 CAPSULE ORAL EVERY 8 HOURS
Qty: 0 | Refills: 0 | Status: DISCONTINUED | OUTPATIENT
Start: 2018-10-07 | End: 2018-10-17

## 2018-10-07 RX ORDER — SODIUM CHLORIDE 9 MG/ML
1000 INJECTION INTRAMUSCULAR; INTRAVENOUS; SUBCUTANEOUS
Qty: 0 | Refills: 0 | Status: DISCONTINUED | OUTPATIENT
Start: 2018-10-07 | End: 2018-10-07

## 2018-10-07 RX ORDER — POTASSIUM CHLORIDE 20 MEQ
10 PACKET (EA) ORAL
Qty: 0 | Refills: 0 | Status: COMPLETED | OUTPATIENT
Start: 2018-10-07 | End: 2018-10-07

## 2018-10-07 RX ORDER — IRON SUCROSE 20 MG/ML
200 INJECTION, SOLUTION INTRAVENOUS EVERY 24 HOURS
Qty: 0 | Refills: 0 | Status: COMPLETED | OUTPATIENT
Start: 2018-10-07 | End: 2018-10-08

## 2018-10-07 RX ORDER — VANCOMYCIN HCL 1 G
500 VIAL (EA) INTRAVENOUS EVERY 6 HOURS
Qty: 0 | Refills: 0 | Status: DISCONTINUED | OUTPATIENT
Start: 2018-10-07 | End: 2018-10-12

## 2018-10-07 RX ORDER — ACETAMINOPHEN 500 MG
1000 TABLET ORAL ONCE
Qty: 0 | Refills: 0 | Status: DISCONTINUED | OUTPATIENT
Start: 2018-10-07 | End: 2018-10-17

## 2018-10-07 RX ORDER — ENOXAPARIN SODIUM 100 MG/ML
40 INJECTION SUBCUTANEOUS DAILY
Qty: 0 | Refills: 0 | Status: DISCONTINUED | OUTPATIENT
Start: 2018-10-07 | End: 2018-10-07

## 2018-10-07 RX ORDER — METRONIDAZOLE 500 MG
TABLET ORAL
Qty: 0 | Refills: 0 | Status: DISCONTINUED | OUTPATIENT
Start: 2018-10-07 | End: 2018-10-09

## 2018-10-07 RX ORDER — ACETAMINOPHEN 500 MG
650 TABLET ORAL EVERY 6 HOURS
Qty: 0 | Refills: 0 | Status: DISCONTINUED | OUTPATIENT
Start: 2018-10-07 | End: 2018-10-17

## 2018-10-07 RX ORDER — POTASSIUM CHLORIDE 20 MEQ
40 PACKET (EA) ORAL ONCE
Qty: 0 | Refills: 0 | Status: COMPLETED | OUTPATIENT
Start: 2018-10-07 | End: 2018-10-07

## 2018-10-07 RX ORDER — MORPHINE SULFATE 50 MG/1
1 CAPSULE, EXTENDED RELEASE ORAL EVERY 6 HOURS
Qty: 0 | Refills: 0 | Status: DISCONTINUED | OUTPATIENT
Start: 2018-10-07 | End: 2018-10-07

## 2018-10-07 RX ORDER — SODIUM CHLORIDE 9 MG/ML
1000 INJECTION, SOLUTION INTRAVENOUS
Qty: 0 | Refills: 0 | Status: DISCONTINUED | OUTPATIENT
Start: 2018-10-07 | End: 2018-10-13

## 2018-10-07 RX ORDER — PANTOPRAZOLE SODIUM 20 MG/1
1 TABLET, DELAYED RELEASE ORAL
Qty: 0 | Refills: 0 | COMMUNITY

## 2018-10-07 RX ORDER — ONDANSETRON 8 MG/1
4 TABLET, FILM COATED ORAL EVERY 8 HOURS
Qty: 0 | Refills: 0 | Status: DISCONTINUED | OUTPATIENT
Start: 2018-10-07 | End: 2018-10-17

## 2018-10-07 RX ORDER — INFLUENZA VIRUS VACCINE 15; 15; 15; 15 UG/.5ML; UG/.5ML; UG/.5ML; UG/.5ML
0.5 SUSPENSION INTRAMUSCULAR ONCE
Qty: 0 | Refills: 0 | Status: COMPLETED | OUTPATIENT
Start: 2018-10-07 | End: 2018-10-17

## 2018-10-07 RX ORDER — METRONIDAZOLE 500 MG
500 TABLET ORAL ONCE
Qty: 0 | Refills: 0 | Status: COMPLETED | OUTPATIENT
Start: 2018-10-07 | End: 2018-10-07

## 2018-10-07 RX ADMIN — Medication 100 MILLIGRAM(S): at 15:28

## 2018-10-07 RX ADMIN — Medication 650 MILLIGRAM(S): at 16:06

## 2018-10-07 RX ADMIN — IRON SUCROSE 110 MILLIGRAM(S): 20 INJECTION, SOLUTION INTRAVENOUS at 13:14

## 2018-10-07 RX ADMIN — SODIUM CHLORIDE 90 MILLILITER(S): 9 INJECTION, SOLUTION INTRAVENOUS at 21:39

## 2018-10-07 RX ADMIN — SODIUM CHLORIDE 90 MILLILITER(S): 9 INJECTION, SOLUTION INTRAVENOUS at 12:10

## 2018-10-07 RX ADMIN — Medication 1 TABLET(S): at 21:39

## 2018-10-07 RX ADMIN — Medication 1 TABLET(S): at 05:58

## 2018-10-07 RX ADMIN — Medication 1 TABLET(S): at 13:15

## 2018-10-07 RX ADMIN — Medication 100 MILLIGRAM(S): at 21:39

## 2018-10-07 RX ADMIN — Medication 100 MILLIEQUIVALENT(S): at 12:10

## 2018-10-07 RX ADMIN — ONDANSETRON 4 MILLIGRAM(S): 8 TABLET, FILM COATED ORAL at 13:14

## 2018-10-07 RX ADMIN — Medication 100 MILLIGRAM(S): at 04:40

## 2018-10-07 RX ADMIN — Medication 100 MILLIEQUIVALENT(S): at 11:01

## 2018-10-07 RX ADMIN — Medication 500 MILLIGRAM(S): at 17:28

## 2018-10-07 RX ADMIN — Medication 650 MILLIGRAM(S): at 15:49

## 2018-10-07 RX ADMIN — Medication 100 MILLIEQUIVALENT(S): at 15:28

## 2018-10-07 RX ADMIN — Medication 40 MILLIEQUIVALENT(S): at 11:01

## 2018-10-07 RX ADMIN — Medication 500 MILLIGRAM(S): at 11:11

## 2018-10-07 RX ADMIN — Medication 500 MILLIGRAM(S): at 04:39

## 2018-10-07 NOTE — H&P ADULT - ASSESSMENT
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 As per patient she is having multiple episodes of watery diarrhea ( 10 - 12 episodes in 24 hrs) mixed with Bright red blood. Patient have intermitted episodes of Diarrhea since 1 week went to see her PCP underwent some test but no significant improvement in Diarrhea. Diarrhea is watery, foul smelling associated with Abdominal pain, fever, chills, nausea and NBNB vomiting. Patient recently travelled to [place], admitted for 10 days for bloody Diarrhea, underwent EGD, Colonoscopy and capsule Endoscopy, found to have ulcers in small intestine. Patient was discharged on oral flagyl, she took for 10 days and diarrhea resolved. patient came to back to [place] and having diarrhea again. Patient denies chest pain, sob, dysuria, skin rashes, or dysuria.     on admission patient is low grade fever Tmax 99.8, tachycardiac, , /65, CBC 23K, HB 10.5, . lactate normal   CT showed [pancoliitis

## 2018-10-07 NOTE — H&P ADULT - NSHPLABSRESULTS_GEN_ALL_CORE
10.8   23.0  )-----------( 360      ( 06 Oct 2018 20:18 )             34.0       10-06    134<L>  |  101  |  6<L>  ----------------------------<  112<H>  3.6   |  24  |  0.59    Ca    8.7      06 Oct 2018 20:18  Phos  2.5     10-06  Mg     1.8     10-06    TPro  6.9  /  Alb  3.1<L>  /  TBili  0.5  /  DBili  0.1  /  AST  24  /  ALT  14  /  AlkPhos  54  10-06        Diffuse pancolonic wall thickening from the cecum to the rectum   consistent with colitis which may be infectious or inflammatory.   Hyperemia of the appendix is likely reactive.

## 2018-10-07 NOTE — H&P ADULT - NSHPPHYSICALEXAM_GEN_ALL_CORE
Vital Signs Last 24 Hrs  T(C): 37.3 (06 Oct 2018 23:46), Max: 37.7 (06 Oct 2018 18:48)  T(F): 99.2 (06 Oct 2018 23:46), Max: 99.8 (06 Oct 2018 18:48)  HR: 107 (06 Oct 2018 23:46) (107 - 112)  BP: 132/56 (06 Oct 2018 23:46) (126/65 - 132/56)  BP(mean): --  RR: 16 (06 Oct 2018 23:46) (16 - 18)  SpO2: 99% (06 Oct 2018 23:46) (99% - 100%)

## 2018-10-07 NOTE — H&P ADULT - HISTORY OF PRESENT ILLNESS
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 As per patient she is having multiple episodes of watery diarrhea ( 10 - 12 episodes in 24 hrs) mixed with Bright red blood. Patient have intermitted episodes of Diarrhea since 1 week went to see her PCP underwent some test but no significant improvement in Diarrhea. Diarrhea is watery, foul smelling associated with Abdominal pain, fever, chills, nausea and NBNB vomiting. Patient recently travelled to [place], admitted for 10 days for bloody Diarrhea, underwent EGD, Colonoscopy and capsule Endoscopy, found to have ulcers in small intestine. Patient was discharged on oral flagyl, she took for 10 days and diarrhea resolved. patient came to back to [place] and having diarrhea again. Patient denies chest pain, sob, dysuria, skin rashes, or dysuria.

## 2018-10-07 NOTE — CHART NOTE - NSCHARTNOTEFT_GEN_A_CORE
Patient test result for Cdiff came back positive. PCP Dr Reeves gave a call saying she is positive for C.diff and stool tests are negative. She had a bleeding ulcer in duodenum and  her HB dropped to 5. Blood was transfused. PMH is factor 11 deficiency, diverticulitis, varicose veins and hysterectomy. For any other questions please contact him on [phone] and [phone].

## 2018-10-07 NOTE — H&P ADULT - PROBLEM SELECTOR PLAN 4
IMPROVE VTE Individual Risk Assessment          RISK                                                          Points  [  ] Previous VTE                                                3  [  ] Thrombophilia                                             2  [  ] Lower limb paralysis                                   2        (unable to hold up >15 seconds)    [  ] Current Cancer                                             2         (within 6 months)  [x  ] Immobilization > 24 hrs                              1  [  ] ICU/CCU stay > 24 hours                             1  [ x ] Age > 60                                                         1    IMPROVE VTE Score: vte score 2   hold prophylaxis due to GI bleed   SCD for now

## 2018-10-07 NOTE — H&P ADULT - PROBLEM SELECTOR PLAN 2
HB 10.8  as per patient recently had EGD, Colonoscopy and capsule endoscopy   pt was told she had ulcers in small intestine   primary team need to follow up on reports   GI consult   f/u FOBT  f/u Iron studies and gastric level

## 2018-10-07 NOTE — H&P ADULT - RS GEN PE MLT RESP DETAILS PC
clear to auscultation bilaterally/breath sounds equal/good air movement/no wheezes/no rhonchi/respirations non-labored/no rales/airway patent

## 2018-10-07 NOTE — CONSULT NOTE ADULT - ASSESSMENT
# pancolitis  # C.difficile colitis    would recommend:    1. Continue oral vancomycin and IV Flagyl  2. Monitor electrolytes and supplement as needed in the setting of diarrhea  3. Contact Isolation until diarrhea resolved  4. Monitor  WBC count    will follow the patient with you and make further recommendation based on the clinical course and Lab results  Thank you for the opportunity to participate in Ms. HALL's care Patient is a 78y old  Female who presents with a chief complaint of Diarrhea which  is watery, foul smelling associated with Abdominal pain, fever, chills, nausea and NBNB vomiting. Patient recently travelled to [place], admitted for 10 days in  [facility] for bloody Diarrhea, underwent EGD, Colonoscopy and capsule Endoscopy, found to have ulcers in small intestine. Patient was discharged on oral flagyl, she took for 10 days and diarrhea resolved. [place]  ago and having diarrhea again. In the ER, she found to have low grade fever, tachycardia, Leukocytosis and diffuse colitis on CT abd/pelvis. The stool for C.difficlie toxin PCR is positive. She has started on oral Vancomycin and ID flagyl. The ID consult  requested to assist with further evaluation and antibiotic  management.       # Diffuse colitis  # C.difficile colitis    would recommend:    1. Continue oral vancomycin and IV Flagyl  2. Monitor electrolytes and supplement as needed in the setting of diarrhea  3. Contact Isolation until diarrhea resolved   4. Monitor  WBC count  5. Avoid systemic antibiotics if possible    d/w patient     will follow the patient with you and make further recommendation based on the clinical course and Lab results  Thank you for the opportunity to participate in Ms. HALL's care

## 2018-10-07 NOTE — H&P ADULT - PROBLEM SELECTOR PLAN 1
Patient came with watery diarrhea, associated with nausea, vomiting, low grade fever   on admission ow grade fever Tmax 99.8, tachycardia hR 112   cbc showed leucocytosis 23k   CT abd showed Diffuse pancolonic wall thickening from the cecum to the rectum   consistent with colitis which may be infectious or inflammatory. Hyperemia of the appendix is likely reactive.  Diarrhea likely infective etiology   r/o C diff   f/u C diff, Stool ova parasite, gram stain, blood cultures, GI PCR   will start on flagyl 500mg Q8 hrs and PO vancomycin 500 mg TID   failed outpatient flagyl therapy   ID consult Dr Altamirano   continue with IV fluids Patient came with watery diarrhea, associated with nausea, vomiting, low grade fever   on admission ow grade fever Tmax 99.8, tachycardia hR 112   cbc showed leucocytosis 23k   CT abd showed Diffuse pancolonic wall thickening from the cecum to the rectum   consistent with colitis which may be infectious or inflammatory. Hyperemia of the appendix is likely reactive.  Diarrhea likely infective etiology   r/o C diff   f/u C diff, Stool ova parasite, gram stain, blood cultures, GI PCR   will start on flagyl 500mg Q8 hrs and PO vancomycin 500 mg TID   failed outpatient flagyl therapy   ID consult Dr Altamirano   continue with IV fluids  isolation precaution

## 2018-10-07 NOTE — H&P ADULT - ATTENDING COMMENTS
I agree with the above information  pt p/w abdominal pain and diarrhea  ct significant for diffuse pancolitis  npo, ivf, flagyl and po vanco for concern of cdiff colitis. f/u stool studies.   symptomatic anemia with connelly. venofer. f/u full anemia panel.  all medical consultant recs appreciated  symptomatic supportive care

## 2018-10-07 NOTE — H&P ADULT - NEUROLOGICAL DETAILS
normal strength/alert and oriented x 3/sensation intact/cranial nerves intact/superficial reflexes intact

## 2018-10-07 NOTE — CONSULT NOTE ADULT - SUBJECTIVE AND OBJECTIVE BOX
Patient is a 78y old  Female who presents with a chief complaint of Diarrhea (07 Oct 2018 03:15)      INTERVAL HPI/OVERNIGHT EVENTS:  T(C): 37.4 (10-07-18 @ 17:32), Max: 38.1 (10-07-18 @ 15:32)  HR: 76 (10-07-18 @ 14:22) (76 - 107)  BP: 106/82 (10-07-18 @ 14:22) (106/82 - 132/56)  RR: 18 (10-07-18 @ 14:22) (16 - 18)  SpO2: 100% (10-07-18 @ 14:22) (96% - 100%)  Wt(kg): --  I&O's Summary      PAST MEDICAL & SURGICAL HISTORY:  Fibromyalgia  GERD (gastroesophageal reflux disease)  Kidney stone: treated with lithotripsy  History of cataract surgery  H/O varicose vein stripping  Bladder tumor: surgically removed; benign  History of cholecystectomy      SOCIAL HISTORY  Alcohol:  Tobacco:  Illicit substance use:      FAMILY HISTORY:      LABS:                        9.1    19.1  )-----------( 294      ( 07 Oct 2018 07:20 )             28.4     10-07    137  |  107  |  4<L>  ----------------------------<  112<H>  3.6   |  22  |  0.46<L>    Ca    8.1<L>      07 Oct 2018 17:56  Phos  3.0     10-07  Mg     2.0     10-07    TPro  6.1  /  Alb  2.6<L>  /  TBili  0.4  /  DBili  x   /  AST  11  /  ALT  13  /  AlkPhos  45  10-07        CAPILLARY BLOOD GLUCOSE                MEDICATIONS  (STANDING):  dextrose 5% + sodium chloride 0.9%. 1000 milliLiter(s) (90 mL/Hr) IV Continuous <Continuous>  influenza   Vaccine 0.5 milliLiter(s) IntraMuscular once  iron sucrose IVPB 200 milliGRAM(s) IV Intermittent every 24 hours  lactobacillus acidophilus 1 Tablet(s) Oral every 8 hours  metroNIDAZOLE  IVPB 500 milliGRAM(s) IV Intermittent every 8 hours  metroNIDAZOLE  IVPB      vancomycin    Solution 500 milliGRAM(s) Oral every 6 hours    MEDICATIONS  (PRN):  acetaminophen   Tablet .. 650 milliGRAM(s) Oral every 6 hours PRN Temp greater or equal to 38C (100.4F)  acetaminophen  IVPB .. 1000 milliGRAM(s) IV Intermittent once PRN Mild Pain (1 - 3)  morphine  - Injectable 1 milliGRAM(s) IV Push every 6 hours PRN Moderate Pain (4 - 6)  ondansetron Injectable 4 milliGRAM(s) IV Push every 8 hours PRN Nausea and/or Vomiting      REVIEW OF SYSTEMS:  CONSTITUTIONAL: No fever, weight loss, or fatigue  EYES: No eye pain, visual disturbances, or discharge  ENMT:  No difficulty hearing, tinnitus, vertigo; No sinus or throat pain  NECK: No pain or stiffness  RESPIRATORY: No cough, wheezing, chills or hemoptysis; No shortness of breath  CARDIOVASCULAR: No chest pain, palpitations, dizziness, or leg swelling  GASTROINTESTINAL: No abdominal or epigastric pain. No nausea, vomiting, or hematemesis; No diarrhea or constipation. No melena or hematochezia.  GENITOURINARY: No dysuria, frequency, hematuria, or incontinence  NEUROLOGICAL: No headaches, memory loss, loss of strength, numbness, or tremors  SKIN: No itching, burning, rashes, or lesions   LYMPH NODES: No enlarged glands  ENDOCRINE: No heat or cold intolerance; No hair loss  MUSCULOSKELETAL: No joint pain or swelling; No muscle, back, or extremity pain  PSYCHIATRIC: No depression, anxiety, mood swings, or difficulty sleeping  HEME/LYMPH: No easy bruising, or bleeding gums  ALLERY AND IMMUNOLOGIC: No hives or eczema    RADIOLOGY & ADDITIONAL TESTS:    Imaging Personally Reviewed:  [ ] YES  [ ] NO    Consultant(s) Notes Reviewed:  [ ] YES  [ ] NO    PHYSICAL EXAM:  GENERAL: NAD, well-groomed, well-developed  HEAD:  Atraumatic, Normocephalic  EYES: EOMI, PERRLA, conjunctiva and sclera clear  ENMT: No tonsillar erythema, exudates, or enlargement; Moist mucous membranes, Good dentition, No lesions  NECK: Supple, No JVD, Normal thyroid  NERVOUS SYSTEM:  Alert & Oriented X3, Good concentration; Motor Strength 5/5 B/L upper and lower extremities; DTRs 2+ intact and symmetric  CHEST/LUNG: Clear to percussion bilaterally; No rales, rhonchi, wheezing, or rubs  HEART: Regular rate and rhythm; No murmurs, rubs, or gallops  ABDOMEN: Soft, Nontender, Nondistended; Bowel sounds present  EXTREMITIES:  2+ Peripheral Pulses, No clubbing, cyanosis, or edema  LYMPH: No lymphadenopathy noted  SKIN: No rashes or lesions    Care Discussed with Consultants/Other Providers [ ] YES  [ ] NO Patient is a 78y old  Female who presents with a chief complaint of Diarrhea (07 Oct 2018 03:15)      REVIEW OF SYSTEMS: Total of twelve systems have been reviewed with patient and found to be negative unless mentioned in HPI          PAST MEDICAL & SURGICAL HISTORY:  Fibromyalgia  GERD (gastroesophageal reflux disease)  Kidney stone: treated with lithotripsy  History of cataract surgery  H/O varicose vein stripping  Bladder tumor: surgically removed; benign  History of cholecystectomy      SOCIAL HISTORY  Alcohol:  Tobacco:  Illicit substance use:      FAMILY HISTORY: Non contributory to the present illness          ALLERGIES: NKDA        T(C): 37.4 (10-07-18 @ 17:32), Max: 38.1 (10-07-18 @ 15:32)  HR: 76 (10-07-18 @ 14:22) (76 - 107)  BP: 106/82 (10-07-18 @ 14:22) (106/82 - 132/56)  RR: 18 (10-07-18 @ 14:22) (16 - 18)  SpO2: 100% (10-07-18 @ 14:22) (96% - 100%)  Wt(kg): --  I&O's Summary        PHYSICAL EXAM:  GENERAL: Not in distress  CHEST/LUNG: Air entry boilaterally  HEART: s1 and s2 present  ABDOMEN: Soft, Nontender, Nondistended  EXTREMITIES:  No pedal edema  CNS: AAOX3    LABS:                        9.1    19.1  )-----------( 294      ( 07 Oct 2018 07:20 )             28.4         10-07    137  |  107  |  4<L>  ----------------------------<  112<H>  3.6   |  22  |  0.46<L>    Ca    8.1<L>      07 Oct 2018 17:56  Phos  3.0     10-07  Mg     2.0     10-07    TPro  6.1  /  Alb  2.6<L>  /  TBili  0.4  /  DBili  x   /  AST  11  /  ALT  13  /  AlkPhos  45  10-07        MEDICATIONS  (STANDING):  dextrose 5% + sodium chloride 0.9%. 1000 milliLiter(s) (90 mL/Hr) IV Continuous <Continuous>  influenza   Vaccine 0.5 milliLiter(s) IntraMuscular once  iron sucrose IVPB 200 milliGRAM(s) IV Intermittent every 24 hours  lactobacillus acidophilus 1 Tablet(s) Oral every 8 hours  metroNIDAZOLE  IVPB 500 milliGRAM(s) IV Intermittent every 8 hours  metroNIDAZOLE  IVPB      vancomycin    Solution 500 milliGRAM(s) Oral every 6 hours    MEDICATIONS  (PRN):  acetaminophen   Tablet .. 650 milliGRAM(s) Oral every 6 hours PRN Temp greater or equal to 38C (100.4F)  acetaminophen  IVPB .. 1000 milliGRAM(s) IV Intermittent once PRN Mild Pain (1 - 3)  morphine  - Injectable 1 milliGRAM(s) IV Push every 6 hours PRN Moderate Pain (4 - 6)  ondansetron Injectable 4 milliGRAM(s) IV Push every 8 hours PRN Nausea and/or Vomiting        RADIOLOGY & ADDITIONAL TESTS: Patient is a 78y old  Female who presents with a chief complaint of Diarrhea which  is watery, foul smelling associated with Abdominal pain, fever, chills, nausea and NBNB vomiting. Patient recently travelled to [place], admitted for 10 days in  [facility] for bloody Diarrhea, underwent EGD, Colonoscopy and capsule Endoscopy, found to have ulcers in small intestine. Patient was discharged on oral flagyl, she took for 10 days and diarrhea resolved. patient [place] back [place] about [place]  ago and having diarrhea again. In the ER, she found to have low grade fever, tachycardia, Leukocytosis and diffuse colitis on CT abd/pelvis. The stool for C.difficlie toxin PCR is positive. She has started on oral Vancomycin and ID flagyl. The ID consult  requested to assist with further evaluation and antibiotic  management.         REVIEW OF SYSTEMS: Total of twelve systems have been reviewed with patient and found to be negative unless mentioned in HPI        PAST MEDICAL & SURGICAL HISTORY:  Fibromyalgia  GERD (gastroesophageal reflux disease)  Kidney stone: treated with lithotripsy  History of cataract surgery  H/O varicose vein stripping  Bladder tumor: surgically removed; benign  History of cholecystectomy        SOCIAL HISTORY  Alcohol: Does not drink  Tobacco: Does not smoke  Illicit substance use: None        FAMILY HISTORY: Non contributory to the present illness        ALLERGIES: NKDA        T(C): 37.4 (10-07-18 @ 17:32), Max: 38.1 (10-07-18 @ 15:32)  HR: 76 (10-07-18 @ 14:22) (76 - 107)  BP: 106/82 (10-07-18 @ 14:22) (106/82 - 132/56)  RR: 18 (10-07-18 @ 14:22) (16 - 18)  SpO2: 100% (10-07-18 @ 14:22) (96% - 100%)  Wt(kg): --  I&O's Summary        PHYSICAL EXAM:  GENERAL: Not in distress  CHEST/LUNG: Air entry bilaterally   HEART: s1 and s2 present  ABDOMEN: Nontender, and Nondistended  EXTREMITIES:  No pedal edema  CNS: AAOX3        LABS:                        9.1    19.1  )-----------( 294      ( 07 Oct 2018 07:20 )             28.4         10-07    137  |  107  |  4<L>  ----------------------------<  112<H>  3.6   |  22  |  0.46<L>    Ca    8.1<L>      07 Oct 2018 17:56  Phos  3.0     10-07  Mg     2.0     10-07    TPro  6.1  /  Alb  2.6<L>  /  TBili  0.4  /  DBili  x   /  AST  11  /  ALT  13  /  AlkPhos  45  10-07        MEDICATIONS  (STANDING):  dextrose 5% + sodium chloride 0.9%. 1000 milliLiter(s) (90 mL/Hr) IV Continuous <Continuous>  influenza   Vaccine 0.5 milliLiter(s) IntraMuscular once  iron sucrose IVPB 200 milliGRAM(s) IV Intermittent every 24 hours  lactobacillus acidophilus 1 Tablet(s) Oral every 8 hours  metroNIDAZOLE  IVPB 500 milliGRAM(s) IV Intermittent every 8 hours  metroNIDAZOLE  IVPB      vancomycin    Solution 500 milliGRAM(s) Oral every 6 hours    MEDICATIONS  (PRN):  acetaminophen   Tablet .. 650 milliGRAM(s) Oral every 6 hours PRN Temp greater or equal to 38C (100.4F)  acetaminophen  IVPB .. 1000 milliGRAM(s) IV Intermittent once PRN Mild Pain (1 - 3)  morphine  - Injectable 1 milliGRAM(s) IV Push every 6 hours PRN Moderate Pain (4 - 6)  ondansetron Injectable 4 milliGRAM(s) IV Push every 8 hours PRN Nausea and/or Vomiting        RADIOLOGY & ADDITIONAL TESTS:    10/6/18: CT Abdomen and Pelvis w/ IV Cont (10.06.18 @ 23:53) Pelvic nodes versus cystic neoplasms. Diffuse colitis,  Thickening of the colon, primarily involving the descending and descending colons, down to the rectum.

## 2018-10-08 DIAGNOSIS — A04.72 ENTEROCOLITIS DUE TO CLOSTRIDIUM DIFFICILE, NOT SPECIFIED AS RECURRENT: ICD-10-CM

## 2018-10-08 LAB
24R-OH-CALCIDIOL SERPL-MCNC: 26.4 NG/ML — LOW (ref 30–80)
24R-OH-CALCIDIOL SERPL-MCNC: 26.8 NG/ML — LOW (ref 30–80)
ANION GAP SERPL CALC-SCNC: 7 MMOL/L — SIGNIFICANT CHANGE UP (ref 5–17)
BASOPHILS # BLD AUTO: 0.1 K/UL — SIGNIFICANT CHANGE UP (ref 0–0.2)
BASOPHILS NFR BLD AUTO: 0.6 % — SIGNIFICANT CHANGE UP (ref 0–2)
BUN SERPL-MCNC: 3 MG/DL — LOW (ref 7–18)
CALCIUM SERPL-MCNC: 8.4 MG/DL — SIGNIFICANT CHANGE UP (ref 8.4–10.5)
CHLORIDE SERPL-SCNC: 106 MMOL/L — SIGNIFICANT CHANGE UP (ref 96–108)
CO2 SERPL-SCNC: 26 MMOL/L — SIGNIFICANT CHANGE UP (ref 22–31)
CREAT SERPL-MCNC: 0.5 MG/DL — SIGNIFICANT CHANGE UP (ref 0.5–1.3)
EOSINOPHIL # BLD AUTO: 0.3 K/UL — SIGNIFICANT CHANGE UP (ref 0–0.5)
EOSINOPHIL NFR BLD AUTO: 3.5 % — SIGNIFICANT CHANGE UP (ref 0–6)
FACT IX PPP CHRO-ACNC: 111 % — SIGNIFICANT CHANGE UP (ref 80–165)
FACT VIII ACT/NOR PPP: 273 % — HIGH (ref 60–125)
FACT XII ACT/NOR PPP: 50 % — LOW (ref 70–145)
FERRITIN SERPL-MCNC: 266 NG/ML — HIGH (ref 15–150)
FOLATE SERPL-MCNC: 11.7 NG/ML — SIGNIFICANT CHANGE UP
GLUCOSE SERPL-MCNC: 102 MG/DL — HIGH (ref 70–99)
HAPTOGLOB SERPL-MCNC: 137 MG/DL — SIGNIFICANT CHANGE UP (ref 34–200)
HCT VFR BLD CALC: 30.4 % — LOW (ref 34.5–45)
HGB BLD-MCNC: 9.5 G/DL — LOW (ref 11.5–15.5)
LDH SERPL L TO P-CCNC: 156 U/L — SIGNIFICANT CHANGE UP (ref 120–225)
LYMPHOCYTES # BLD AUTO: 1.5 K/UL — SIGNIFICANT CHANGE UP (ref 1–3.3)
LYMPHOCYTES # BLD AUTO: 16.6 % — SIGNIFICANT CHANGE UP (ref 13–44)
MCHC RBC-ENTMCNC: 29.1 PG — SIGNIFICANT CHANGE UP (ref 27–34)
MCHC RBC-ENTMCNC: 31.4 GM/DL — LOW (ref 32–36)
MCV RBC AUTO: 92.6 FL — SIGNIFICANT CHANGE UP (ref 80–100)
MONOCYTES # BLD AUTO: 0.9 K/UL — SIGNIFICANT CHANGE UP (ref 0–0.9)
MONOCYTES NFR BLD AUTO: 10 % — SIGNIFICANT CHANGE UP (ref 2–14)
NEUTROPHILS # BLD AUTO: 6.1 K/UL — SIGNIFICANT CHANGE UP (ref 1.8–7.4)
NEUTROPHILS NFR BLD AUTO: 69.3 % — SIGNIFICANT CHANGE UP (ref 43–77)
NIGHT BLUE STAIN TISS: SIGNIFICANT CHANGE UP
PLATELET # BLD AUTO: 315 K/UL — SIGNIFICANT CHANGE UP (ref 150–400)
POTASSIUM SERPL-MCNC: 3.1 MMOL/L — LOW (ref 3.5–5.3)
POTASSIUM SERPL-SCNC: 3.1 MMOL/L — LOW (ref 3.5–5.3)
RBC # BLD: 2.85 M/UL — LOW (ref 3.8–5.2)
RBC # BLD: 3.28 M/UL — LOW (ref 3.8–5.2)
RBC # FLD: 15 % — HIGH (ref 10.3–14.5)
RETICS #: 58.1 K/UL — SIGNIFICANT CHANGE UP (ref 25–125)
RETICS/RBC NFR: 2 % — SIGNIFICANT CHANGE UP (ref 0.5–2.5)
SODIUM SERPL-SCNC: 139 MMOL/L — SIGNIFICANT CHANGE UP (ref 135–145)
SPECIMEN SOURCE: SIGNIFICANT CHANGE UP
VIT B12 SERPL-MCNC: 456 PG/ML — SIGNIFICANT CHANGE UP (ref 232–1245)
WBC # BLD: 8.8 K/UL — SIGNIFICANT CHANGE UP (ref 3.8–10.5)
WBC # FLD AUTO: 8.8 K/UL — SIGNIFICANT CHANGE UP (ref 3.8–10.5)

## 2018-10-08 RX ORDER — POTASSIUM CHLORIDE 20 MEQ
40 PACKET (EA) ORAL EVERY 4 HOURS
Qty: 0 | Refills: 0 | Status: COMPLETED | OUTPATIENT
Start: 2018-10-08 | End: 2018-10-08

## 2018-10-08 RX ORDER — POTASSIUM CHLORIDE 20 MEQ
10 PACKET (EA) ORAL
Qty: 0 | Refills: 0 | Status: COMPLETED | OUTPATIENT
Start: 2018-10-08 | End: 2018-10-08

## 2018-10-08 RX ADMIN — Medication 500 MILLIGRAM(S): at 23:45

## 2018-10-08 RX ADMIN — Medication 1 TABLET(S): at 21:58

## 2018-10-08 RX ADMIN — Medication 500 MILLIGRAM(S): at 13:51

## 2018-10-08 RX ADMIN — Medication 40 MILLIEQUIVALENT(S): at 18:18

## 2018-10-08 RX ADMIN — Medication 500 MILLIGRAM(S): at 05:47

## 2018-10-08 RX ADMIN — Medication 500 MILLIGRAM(S): at 00:31

## 2018-10-08 RX ADMIN — Medication 1 TABLET(S): at 05:47

## 2018-10-08 RX ADMIN — Medication 100 MILLIGRAM(S): at 21:56

## 2018-10-08 RX ADMIN — Medication 100 MILLIEQUIVALENT(S): at 16:18

## 2018-10-08 RX ADMIN — Medication 100 MILLIGRAM(S): at 05:47

## 2018-10-08 RX ADMIN — Medication 40 MILLIEQUIVALENT(S): at 13:51

## 2018-10-08 RX ADMIN — Medication 100 MILLIEQUIVALENT(S): at 18:19

## 2018-10-08 RX ADMIN — Medication 100 MILLIGRAM(S): at 13:51

## 2018-10-08 RX ADMIN — IRON SUCROSE 110 MILLIGRAM(S): 20 INJECTION, SOLUTION INTRAVENOUS at 13:51

## 2018-10-08 RX ADMIN — Medication 500 MILLIGRAM(S): at 18:18

## 2018-10-08 RX ADMIN — Medication 1 TABLET(S): at 13:51

## 2018-10-08 RX ADMIN — Medication 100 MILLIEQUIVALENT(S): at 15:20

## 2018-10-08 NOTE — PROGRESS NOTE ADULT - PROBLEM SELECTOR PLAN 2
HB 10.8  as per patient recently had EGD, Colonoscopy and capsule endoscopy   pt was told she had ulcers in small intestine   primary team need to follow up on reports   GI consult   f/u FOBT  f/u Iron studies and gastric level -watery diarrhea, associated with nausea, vomiting, low grade fever   -on admission ow grade fever Tmax 99.8, tachycardia hR 112   -cbc showed leucocytosis 23k   -CT abd showed Diffuse pancolonic wall thickening from the cecum to the rectum   consistent with colitis which may be infectious or inflammatory. Hyperemia of the appendix is likely reactive.  -C.diff positive  -failed outpatient flagyl therapy   -c/w flagyl 500mg Q8 hrs and PO vancomycin 500 mg TID   -continue with IV fluids  -isolation precaution  -ID consult Dr Altamirano

## 2018-10-08 NOTE — PROGRESS NOTE ADULT - PROBLEM SELECTOR PLAN 4
IMPROVE VTE Individual Risk Assessment          RISK                                                          Points  [  ] Previous VTE                                                3  [  ] Thrombophilia                                             2  [  ] Lower limb paralysis                                   2        (unable to hold up >15 seconds)    [  ] Current Cancer                                             2         (within 6 months)  [x  ] Immobilization > 24 hrs                              1  [  ] ICU/CCU stay > 24 hours                             1  [ x ] Age > 60                                                         1    IMPROVE VTE Score: vte score 2   hold prophylaxis due to GI bleed   SCD for now -patient has h/o hemophilia   -f/u factors levels

## 2018-10-08 NOTE — CONSULT NOTE ADULT - SUBJECTIVE AND OBJECTIVE BOX
HISTORY OF PRESENT ILLNESS:   78 female from home with PMHx of  diverticulosis,  PUD,  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 As per patient she is having multiple episodes of watery diarrhea ( 10 - 12 episodes in 24 hrs) mixed with Bright red blood. Patient have intermitted episodes of Diarrhea since 1 week went to see her PCP underwent some test but no significant improvement in Diarrhea. Diarrhea is watery, foul smelling associated with Abdominal pain, fever, chills, nausea and NBNB vomiting. Patient recently travelled to [place], admitted for 10 days for bloody Diarrhea, underwent EGD, Colonoscopy and capsule Endoscopy, found to have ulcers in small intestine. Patient was discharged on oral flagyl, she took for 10 days and diarrhea resolved. patient [place] back [place] and having [place] again. Patient denies chest pain, sob, dysuria, skin rashes, or dysuria.     PAST MEDICAL & SURGICAL HISTORY:  Fibromyalgia  GERD (gastroesophageal reflux disease)  Kidney stone: treated with lithotripsy  History of cataract surgery  H/O varicose vein stripping  Bladder tumor: surgically removed; benign  History of cholecystectomy    PREVIOUS DIAGNOSTIC TESTING:    [ ]x Echocardiogram:< from: Transthoracic Echocardiogram (02.16.17 @ 15:11) >  CONCLUSIONS:  1. Minimal mitral regurgitation.  2. Normal left atrium.  The intra-atrial septum appears  hypermobile and maybe aneursymal.  3. Normal left ventricular internal dimensions and wall  thicknesses.  4. Normal left ventricular function.  5. Normal right ventricular size and function.  6. RVS Pressure is 41 mm Hg. Mild pulmonary hypertension.    ------------------------------------------------------------------------  Confirmed on  2/17/2017 - 12:29:36 by Alen Kothari MD  ------------------------------------------------------------------------    < end of copied text >    [ ]  Catheterization:  [ ] Stress Test:  	    MEDICATIONS:    metroNIDAZOLE  IVPB 500 milliGRAM(s) IV Intermittent every 8 hours  metroNIDAZOLE  IVPB      vancomycin    Solution 500 milliGRAM(s) Oral every 6 hours      acetaminophen   Tablet .. 650 milliGRAM(s) Oral every 6 hours PRN  acetaminophen  IVPB .. 1000 milliGRAM(s) IV Intermittent once PRN  morphine  - Injectable 1 milliGRAM(s) IV Push every 6 hours PRN  ondansetron Injectable 4 milliGRAM(s) IV Push every 8 hours PRN        dextrose 5% + sodium chloride 0.9%. 1000 milliLiter(s) IV Continuous <Continuous>  influenza   Vaccine 0.5 milliLiter(s) IntraMuscular once  iron sucrose IVPB 200 milliGRAM(s) IV Intermittent every 24 hours      Allergies    aspirin (Unknown)    Intolerances        FAMILY HISTORY:  No pertinent family history in first degree relatives      SOCIAL HISTORY:    [x ] Non-smoker  [ ] Smoker  [ ] Alcohol      REVIEW OF SYSTEMS:  [ ]chest pain  [ x ]shortness of breath  [  ]palpitations  [  ]syncope  [ ]near syncope [  ]diplopia  [  ]altered mental status [  ]fevers  [ ]chills [ ]nausea  [ ]vomitting  [x ]abdominal pain  [ ]melena  [ ]BRBPR  [  ]epistaxis  [  ]rash  [  ]lower extremity edema      CONSTITUTIONAL: No fever, weight loss, or fatigue  EYES: No eye pain, visual disturbances, or discharge  ENMT:  No difficulty hearing, tinnitus, vertigo; No sinus or throat pain  NECK: No pain or stiffness  RESPIRATORY: No cough, wheezing, chills or hemoptysis; No Shortness of Breath  CARDIOVASCULAR: No chest pain, palpitations, passing out, dizziness, or leg swelling  GASTROINTESTINAL: No abdominal or epigastric pain. No nausea, vomiting, or hematemesis; No diarrhea or constipation. No melena or hematochezia.  GENITOURINARY: No dysuria, frequency, hematuria, or incontinence  NEUROLOGICAL: No headaches, memory loss, loss of strength, numbness, or tremors  SKIN: No itching, burning, rashes, or lesions   LYMPH Nodes: No enlarged glands  ENDOCRINE: No heat or cold intolerance; No hair loss  MUSCULOSKELETAL: No joint pain or swelling; No muscle, back, or extremity pain  PSYCHIATRIC: No depression, anxiety, mood swings, or difficulty sleeping  HEME/LYMPH: No easy bruising, or bleeding gums  ALLERY AND IMMUNOLOGIC: No hives or eczema	    [ ] All others negative	  [ ] Unable to obtain    PHYSICAL EXAM:  T(C): 36.8 (10-08-18 @ 05:21), Max: 38.1 (10-07-18 @ 15:32)  HR: 85 (10-08-18 @ 05:21) (76 - 96)  BP: 124/60 (10-08-18 @ 05:21) (106/82 - 130/77)  RR: 16 (10-08-18 @ 05:21) (16 - 18)  SpO2: 98% (10-08-18 @ 05:21) (98% - 100%)  Wt(kg): --  I&O's Summary      Appearance: Normal	  HEENT:   Normal oral mucosa, PERRL, EOMI	  Lymphatic: No lymphadenopathy  Cardiovascular: Normal S1 S2, No JVD, No murmurs, No edema  Respiratory: Lungs clear to auscultation	  Psychiatry: A & O x 3, Mood & affect appropriate  Gastrointestinal:  Soft, Non-tender, + BS	  Skin: No rashes, No ecchymoses, No cyanosis	  Neurologic: Non-focal  Extremities: Normal range of motion, No clubbing, cyanosis or edema  Vascular: Peripheral pulses palpable 2+ bilaterally    TELEMETRY: 	  none   ECG:  	NSR  85, NAD, no acute ECG finding   RADIOLOGY: < from: Xray Chest 1 View AP/PA (02.15.17 @ 13:12) >  Heart is magnified by technique. No lung finding is evident.    IMPRESSION: Clear lungs.    < end of copied text >    OTHER: 	  	  LABS:	 	    CARDIAC MARKERS:                          9.1    19.1  )-----------( 294      ( 07 Oct 2018 07:20 )             28.4     10-07    137  |  107  |  4<L>  ----------------------------<  112<H>  3.6   |  22  |  0.46<L>    Ca    8.1<L>      07 Oct 2018 17:56  Phos  3.0     10-07  Mg     2.0     10-07    TPro  6.1  /  Alb  2.6<L>  /  TBili  0.4  /  DBili  x   /  AST  11  /  ALT  13  /  AlkPhos  45  10-07    proBNP:   Lipid Profile:   HgA1c: Hemoglobin A1C, Whole Blood: 5.2 % (10-07 @ 11:22)    TSH:     ASSESSMENT/PLAN: 	  78 female from home with PMHx of  diverticulosis,  PUD,  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 As per patient she is having multiple episodes of watery diarrhea ( 10 - 12 episodes in 24 hrs) mixed with Bright red blood. Patient have intermitted episodes of Diarrhea since 1 week went to see her PCP underwent some test but no significant improvement in Diarrhea. Diarrhea is watery, foul smelling associated with Abdominal pain, fever, chills, nausea and NBNB vomiting. Patient recently travelled to [place], admitted for 10 days for bloody Diarrhea, underwent EGD, Colonoscopy and capsule Endoscopy, found to have ulcers in small intestine. Patient was discharged on oral flagyl, she took for 10 days and diarrhea resolved. [place] again. Patient denies chest pain, sob, dysuria, skin rashes, or dysuria. On admission patient is low grade fever Tmax 99.8, tachycardiac, , /65, CBC 23K, HB 10.5, . lactate normal , CT abd :showed pancoliitis     Cont ABX   GI / DVT prophylaxis.   keep K>4, mag >2.0   cont IV hydration , no chf on exam   would obtain ECHO to eval on LV fx   D/W Dr Kothari HISTORY OF PRESENT ILLNESS:   78 female from home with PMHx of  diverticulosis,  PUD,  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 As per patient she is having multiple episodes of watery diarrhea ( 10 - 12 episodes in 24 hrs) mixed with Bright red blood. Patient have intermitted episodes of Diarrhea since 1 week went to see her PCP underwent some test but no significant improvement in Diarrhea. Diarrhea is watery, foul smelling associated with Abdominal pain, fever, chills, nausea and NBNB vomiting. Patient recently travelled to [place], admitted for 10 days for bloody Diarrhea, underwent EGD, Colonoscopy and capsule Endoscopy, found to have ulcers in small intestine. Patient was discharged on oral flagyl, she took for 10 days and diarrhea resolved. patient came to back [place] again. Patient denies chest pain, sob, dysuria, skin rashes, or dysuria.     PAST MEDICAL & SURGICAL HISTORY:  Fibromyalgia  GERD (gastroesophageal reflux disease)  Kidney stone: treated with lithotripsy  History of cataract surgery  H/O varicose vein stripping  Bladder tumor: surgically removed; benign  History of cholecystectomy    PREVIOUS DIAGNOSTIC TESTING:    [ ]x Echocardiogram:< from: Transthoracic Echocardiogram (02.16.17 @ 15:11) >  CONCLUSIONS:  1. Minimal mitral regurgitation.  2. Normal left atrium.  The intra-atrial septum appears  hypermobile and maybe aneursymal.  3. Normal left ventricular internal dimensions and wall  thicknesses.  4. Normal left ventricular function.  5. Normal right ventricular size and function.  6. RVS Pressure is 41 mm Hg. Mild pulmonary hypertension.    ------------------------------------------------------------------------  Confirmed on  2/17/2017 - 12:29:36 by Alen Kothari MD  ------------------------------------------------------------------------    < end of copied text >    [ ]  Catheterization:  [ ] Stress Test:  	    MEDICATIONS:    metroNIDAZOLE  IVPB 500 milliGRAM(s) IV Intermittent every 8 hours  metroNIDAZOLE  IVPB      vancomycin    Solution 500 milliGRAM(s) Oral every 6 hours      acetaminophen   Tablet .. 650 milliGRAM(s) Oral every 6 hours PRN  acetaminophen  IVPB .. 1000 milliGRAM(s) IV Intermittent once PRN  morphine  - Injectable 1 milliGRAM(s) IV Push every 6 hours PRN  ondansetron Injectable 4 milliGRAM(s) IV Push every 8 hours PRN        dextrose 5% + sodium chloride 0.9%. 1000 milliLiter(s) IV Continuous <Continuous>  influenza   Vaccine 0.5 milliLiter(s) IntraMuscular once  iron sucrose IVPB 200 milliGRAM(s) IV Intermittent every 24 hours      Allergies    aspirin (Unknown)    Intolerances        FAMILY HISTORY:  No pertinent family history in first degree relatives      SOCIAL HISTORY:    [x ] Non-smoker  [ ] Smoker  [ ] Alcohol      REVIEW OF SYSTEMS:  [ ]chest pain  [ x ]shortness of breath  [  ]palpitations  [  ]syncope  [ ]near syncope [  ]diplopia  [  ]altered mental status [  ]fevers  [ ]chills [ ]nausea  [ ]vomitting  [x ]abdominal pain  [ ]melena  [ ]BRBPR  [  ]epistaxis  [  ]rash  [  ]lower extremity edema      CONSTITUTIONAL: No fever, weight loss, or fatigue  EYES: No eye pain, visual disturbances, or discharge  ENMT:  No difficulty hearing, tinnitus, vertigo; No sinus or throat pain  NECK: No pain or stiffness  RESPIRATORY: No cough, wheezing, chills or hemoptysis; No Shortness of Breath  CARDIOVASCULAR: No chest pain, palpitations, passing out, dizziness, or leg swelling  GASTROINTESTINAL: No abdominal or epigastric pain. No nausea, vomiting, or hematemesis; No diarrhea or constipation. No melena or hematochezia.  GENITOURINARY: No dysuria, frequency, hematuria, or incontinence  NEUROLOGICAL: No headaches, memory loss, loss of strength, numbness, or tremors  SKIN: No itching, burning, rashes, or lesions   LYMPH Nodes: No enlarged glands  ENDOCRINE: No heat or cold intolerance; No hair loss  MUSCULOSKELETAL: No joint pain or swelling; No muscle, back, or extremity pain  PSYCHIATRIC: No depression, anxiety, mood swings, or difficulty sleeping  HEME/LYMPH: No easy bruising, or bleeding gums  ALLERY AND IMMUNOLOGIC: No hives or eczema	    [ ] All others negative	  [ ] Unable to obtain    PHYSICAL EXAM:  T(C): 36.8 (10-08-18 @ 05:21), Max: 38.1 (10-07-18 @ 15:32)  HR: 85 (10-08-18 @ 05:21) (76 - 96)  BP: 124/60 (10-08-18 @ 05:21) (106/82 - 130/77)  RR: 16 (10-08-18 @ 05:21) (16 - 18)  SpO2: 98% (10-08-18 @ 05:21) (98% - 100%)  Wt(kg): --  I&O's Summary      Gen: Appears well in NAD  HEENT:  (-)icterus (-)pallor  CV: N S1 S2 1/6 NANCI (+)2 Pulses B/l  Resp:  Clear to ausculatation B/L, normal effort  GI: (+) BS Soft, NT, ND  Lymph:  (-)Edema, (-)obvious lymphadenopathy  Skin: Warm to touch, Normal turgor  Psych: Appropriate mood and affect    ECG:  	NSR  85, NAD, no acute ECG finding   RADIOLOGY: < from: Xray Chest 1 View AP/PA (02.15.17 @ 13:12) >  Heart is magnified by technique. No lung finding is evident.    IMPRESSION: Clear lungs.    < end of copied text >    OTHER: 	  	  LABS:	 	    CARDIAC MARKERS:                          9.1    19.1  )-----------( 294      ( 07 Oct 2018 07:20 )             28.4     10-07    137  |  107  |  4<L>  ----------------------------<  112<H>  3.6   |  22  |  0.46<L>    Ca    8.1<L>      07 Oct 2018 17:56  Phos  3.0     10-07  Mg     2.0     10-07    TPro  6.1  /  Alb  2.6<L>  /  TBili  0.4  /  DBili  x   /  AST  11  /  ALT  13  /  AlkPhos  45  10-07    proBNP:   Lipid Profile:   HgA1c: Hemoglobin A1C, Whole Blood: 5.2 % (10-07 @ 11:22)    TSH:     ASSESSMENT/PLAN: 	  78 female from home with PMHx of  diverticulosis,  PUD,  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 As per patient she is having multiple episodes of watery diarrhea ( 10 - 12 episodes in 24 hrs) mixed with Bright red blood. Patient have intermitted episodes of Diarrhea since 1 week went to see her PCP underwent some test but no significant improvement in Diarrhea. Diarrhea is watery, foul smelling associated with Abdominal pain, fever, chills, nausea and NBNB vomiting. Patient recently travelled to [place], admitted for 10 days for bloody Diarrhea, underwent EGD, Colonoscopy and capsule Endoscopy, found to have ulcers in small intestine. Patient was discharged on oral flagyl, she took for 10 days and diarrhea resolved. [place] back [place] again. Patient denies chest pain, sob, dysuria, skin rashes, or dysuria. On admission patient is low grade fever Tmax 99.8, tachycardiac, , /65, CBC 23K, HB 10.5, . lactate normal , CT abd :showed pancoliitis     Cont ABX   GI / DVT prophylaxis.   keep K>4, mag >2.0   cont IV hydration , no chf on exam   would obtain ECHO to eval on LV fx   D/W Dr Kothari

## 2018-10-08 NOTE — PROGRESS NOTE ADULT - SUBJECTIVE AND OBJECTIVE BOX
Patient is seen and examined at the bed side, is afebrile. She is feeling better. The Leukocytosis trended down to normal.       REVIEW OF SYSTEMS: All other review systems are negative      ALLERGIES: NKDA        Vital Signs Last 24 Hrs  T(C): 36.7 (08 Oct 2018 14:10), Max: 37 (07 Oct 2018 21:31)  T(F): 98 (08 Oct 2018 14:10), Max: 98.6 (07 Oct 2018 21:31)  HR: 82 (08 Oct 2018 14:10) (82 - 96)  BP: 140/66 (08 Oct 2018 14:10) (124/60 - 140/66)  BP(mean): --  RR: 18 (08 Oct 2018 14:10) (16 - 18)  SpO2: 99% (08 Oct 2018 14:10) (98% - 100%)      PHYSICAL EXAM:  GENERAL: Not in distress  CHEST/LUNG: Air entry bilaterally   HEART: s1 and s2 present  ABDOMEN: Nontender, and Nondistended  EXTREMITIES:  No pedal edema  CNS: AAOX3        LABS:                        9.5    8.8   )-----------( 315      ( 08 Oct 2018 12:12 )             30.4                           9.1    19.1  )-----------( 294      ( 07 Oct 2018 07:20 )             28.4         10-08    139  |  106  |  3<L>  ----------------------------<  102<H>  3.1<L>   |  26  |  0.50    Ca    8.4      08 Oct 2018 12:12  Phos  3.0     10-07  Mg     2.0     10-07    TPro  6.1  /  Alb  2.6<L>  /  TBili  0.4  /  DBili  x   /  AST  11  /  ALT  13  /  AlkPhos  45  10-07    10-07    137  |  107  |  4<L>  ----------------------------<  112<H>  3.6   |  22  |  0.46<L>    Ca    8.1<L>      07 Oct 2018 17:56  Phos  3.0     10-07  Mg     2.0     10-07    TPro  6.1  /  Alb  2.6<L>  /  TBili  0.4  /  DBili  x   /  AST  11  /  ALT  13  /  AlkPhos  45  10-07        MEDICATIONS  (STANDING):  dextrose 5% + sodium chloride 0.9%. 1000 milliLiter(s) (90 mL/Hr) IV Continuous <Continuous>  influenza   Vaccine 0.5 milliLiter(s) IntraMuscular once  lactobacillus acidophilus 1 Tablet(s) Oral every 8 hours  metroNIDAZOLE  IVPB 500 milliGRAM(s) IV Intermittent every 8 hours  metroNIDAZOLE  IVPB      vancomycin    Solution 500 milliGRAM(s) Oral every 6 hours    MEDICATIONS  (PRN):  acetaminophen   Tablet .. 650 milliGRAM(s) Oral every 6 hours PRN Temp greater or equal to 38C (100.4F)  acetaminophen  IVPB .. 1000 milliGRAM(s) IV Intermittent once PRN Mild Pain (1 - 3)  morphine  - Injectable 1 milliGRAM(s) IV Push every 6 hours PRN Moderate Pain (4 - 6)  ondansetron Injectable 4 milliGRAM(s) IV Push every 8 hours PRN Nausea and/or Vomiting        RADIOLOGY & ADDITIONAL TESTS:    10/6/18: CT Abdomen and Pelvis w/ IV Cont (10.06.18 @ 23:53) Pelvic nodes versus cystic neoplasms. Diffuse colitis,  Thickening of the colon, primarily involving the descending and descending colons, down to the rectum. Patient is seen and examined at the bed side, is afebrile. She has four episodes of diarrhea today, no abdominal pain. The Leukocytosis trended down to normal.       REVIEW OF SYSTEMS: All other review systems are negative      ALLERGIES: NKDA        Vital Signs Last 24 Hrs  T(C): 36.7 (08 Oct 2018 14:10), Max: 37 (07 Oct 2018 21:31)  T(F): 98 (08 Oct 2018 14:10), Max: 98.6 (07 Oct 2018 21:31)  HR: 82 (08 Oct 2018 14:10) (82 - 96)  BP: 140/66 (08 Oct 2018 14:10) (124/60 - 140/66)  BP(mean): --  RR: 18 (08 Oct 2018 14:10) (16 - 18)  SpO2: 99% (08 Oct 2018 14:10) (98% - 100%)      PHYSICAL EXAM:  GENERAL: Not in distress  CHEST/LUNG: Air entry bilaterally   HEART: s1 and s2 present  ABDOMEN: Nontender, and Nondistended  EXTREMITIES:  No pedal edema  CNS: AAOX3        LABS:                        9.5    8.8   )-----------( 315      ( 08 Oct 2018 12:12 )             30.4                           9.1    19.1  )-----------( 294      ( 07 Oct 2018 07:20 )             28.4         10-08    139  |  106  |  3<L>  ----------------------------<  102<H>  3.1<L>   |  26  |  0.50    Ca    8.4      08 Oct 2018 12:12  Phos  3.0     10-07  Mg     2.0     10-07    TPro  6.1  /  Alb  2.6<L>  /  TBili  0.4  /  DBili  x   /  AST  11  /  ALT  13  /  AlkPhos  45  10-07    10-07    137  |  107  |  4<L>  ----------------------------<  112<H>  3.6   |  22  |  0.46<L>    Ca    8.1<L>      07 Oct 2018 17:56  Phos  3.0     10-07  Mg     2.0     10-07    TPro  6.1  /  Alb  2.6<L>  /  TBili  0.4  /  DBili  x   /  AST  11  /  ALT  13  /  AlkPhos  45  10-07        MEDICATIONS  (STANDING):  dextrose 5% + sodium chloride 0.9%. 1000 milliLiter(s) (90 mL/Hr) IV Continuous <Continuous>  influenza   Vaccine 0.5 milliLiter(s) IntraMuscular once  lactobacillus acidophilus 1 Tablet(s) Oral every 8 hours  metroNIDAZOLE  IVPB 500 milliGRAM(s) IV Intermittent every 8 hours  metroNIDAZOLE  IVPB      vancomycin    Solution 500 milliGRAM(s) Oral every 6 hours    MEDICATIONS  (PRN):  acetaminophen   Tablet .. 650 milliGRAM(s) Oral every 6 hours PRN Temp greater or equal to 38C (100.4F)  acetaminophen  IVPB .. 1000 milliGRAM(s) IV Intermittent once PRN Mild Pain (1 - 3)  morphine  - Injectable 1 milliGRAM(s) IV Push every 6 hours PRN Moderate Pain (4 - 6)  ondansetron Injectable 4 milliGRAM(s) IV Push every 8 hours PRN Nausea and/or Vomiting        RADIOLOGY & ADDITIONAL TESTS:    10/6/18: CT Abdomen and Pelvis w/ IV Cont (10.06.18 @ 23:53) Pelvic nodes versus cystic neoplasms. Diffuse colitis,  Thickening of the colon, primarily involving the descending and descending colons, down to the rectum.

## 2018-10-08 NOTE — PROGRESS NOTE ADULT - ASSESSMENT
Patient is a 78y old  Female who presents with a chief complaint of Diarrhea which  is watery, foul smelling associated with Abdominal pain, fever, chills, nausea and NBNB vomiting. Patient recently travelled to [place], admitted for 10 days in  [facility] for bloody Diarrhea, underwent EGD, Colonoscopy and capsule Endoscopy, found to have ulcers in small intestine. Patient was discharged on oral flagyl, she took for 10 days and diarrhea resolved. patient came to back [place] about 3 [place]  ago and having diarrhea again. In the ER, she found to have low grade fever, tachycardia, Leukocytosis and diffuse colitis on CT abd/pelvis. The stool for C.difficlie toxin PCR is positive. She has started on oral Vancomycin and ID flagyl. The ID consult  requested to assist with further evaluation and antibiotic  management.       # Diffuse colitis  # C.difficile colitis    would recommend:    1. Continue oral vancomycin and IV Flagyl  2. Monitor electrolytes and supplement as needed in the setting of diarrhea  3. Contact Isolation until diarrhea resolved   4. Avoid systemic antibiotics if possible    d/w patient     will follow the patient with you Patient is a 78y old  Female who presents with a chief complaint of Diarrhea which  is watery, foul smelling associated with Abdominal pain, fever, chills, nausea and NBNB vomiting. Patient recently travelled to [place], admitted for 10 days in  [facility] for bloody Diarrhea, underwent EGD, Colonoscopy and capsule Endoscopy, found to have ulcers in small intestine. Patient was discharged on oral flagyl, she took for 10 days and diarrhea resolved. patient came to back to [place] about 3 [place]  ago and having diarrhea again. In the ER, she found to have low grade fever, tachycardia, Leukocytosis and diffuse colitis on CT abd/pelvis. The stool for C.difficlie toxin PCR is positive. She has started on oral Vancomycin and ID flagyl. The ID consult  requested to assist with further evaluation and antibiotic  management.       # Diffuse colitis  # C.difficile colitis    would recommend:    1. Discontinue IV Flagyl  2. Continue oral vancomycin X total of 14 days  3. Monitor electrolytes and supplement as needed in the setting of diarrhea  4. Contact Isolation until diarrhea resolved   5. Avoid systemic antibiotics if possible    d/w patient and Nursing staff    will follow the patient with you

## 2018-10-08 NOTE — CONSULT NOTE ADULT - SUBJECTIVE AND OBJECTIVE BOX
GI INITIAL CONSULT    HPI: 78F p/w severe diarrhea and hypovolemia. Pt states that she had GI bleeding at the end of Aug 2018 in [place], where she was admitted and had an EGD, colonoscopy and a capsule endoscopy. EGD and colonoscopy were reportedly normal. Capsule endoscopy reportedly showed SB ulcers of unclear etiology. Shortly after these tests the pt developed diarrhea that improved with Flagyl. It worsened after Flagyl was stopped. Pt was given another course of Flagyl, which did not help. Pt was admitted to the hospital after a month of diarrhea and was found to have C. diff colitis.    PMH: fibromyalgia  PSH: cholecystectomy; hysterectomy; renal tumor resection    Meds: MEDICATIONS  (STANDING):  dextrose 5% + sodium chloride 0.9%. 1000 milliLiter(s) (90 mL/Hr) IV Continuous <Continuous>  influenza   Vaccine 0.5 milliLiter(s) IntraMuscular once  lactobacillus acidophilus 1 Tablet(s) Oral every 8 hours  metroNIDAZOLE  IVPB 500 milliGRAM(s) IV Intermittent every 8 hours  metroNIDAZOLE  IVPB      vancomycin    Solution 500 milliGRAM(s) Oral every 6 hours    SH: no tobacco or EtOH  FH: no h/o GI malignancies  ROS:  CONSTITUTIONAL: No fever, weight loss, or fatigue  EYES: No eye pain, visual disturbances, or discharge  ENT:  No difficulty hearing, tinnitus, vertigo; No sinus or throat pain  NECK: No pain or stiffness  RESPIRATORY: No cough, wheezing, chills or hemoptysis, shortness of Breath  CARDIOVASCULAR: No chest pain, palpitations, passing out, dizziness, or leg swelling  GASTROINTESTINAL: see HPI  GENITOURINARY: No dysuria, frequency, hematuria, or incontinence  NEUROLOGICAL: No headaches, memory loss, loss of strength, numbness, or tremors  SKIN: No itching, burning, rashes, or lesions   MUSCULOSKELETAL: No arthralgia, myalgia, or back pain.     Vitals: T(C): 36.7 (10-08-18 @ 14:10)  T(F): 98 (10-08-18 @ 14:10), Max: 98.6 (10-07-18 @ 21:31)  HR: 82 (10-08-18 @ 14:10) (82 - 96)  BP: 140/66 (10-08-18 @ 14:10) (124/60 - 140/66)    Gen: NAD  CVS: RRR; S1/S2  Chest: CTABL  Abd: S/NT/mildly distended and tympanetic                        9.5    8.8   )-----------( 315      ( 08 Oct 2018 12:12 )             30.4   10-08    139  |  106  |  3<L>  ----------------------------<  102<H>  3.1<L>   |  26  |  0.50    Ca    8.4      08 Oct 2018 12:12  Phos  3.0     10-07  Mg     2.0     10-07    TPro  6.1  /  Alb  2.6<L>  /  TBili  0.4  /  DBili  x   /  AST  11  /  ALT  13  /  AlkPhos  45  10-07    C. diff (+)    CT Abdomen and Pelvis w/ IV Cont (10.06.18 @ 23:53)  IMPRESSION: Pelvic nodes versus cystic neoplasms. Diffuse colitis as described.

## 2018-10-08 NOTE — PROGRESS NOTE ADULT - PROBLEM SELECTOR PLAN 1
Patient came with watery diarrhea, associated with nausea, vomiting, low grade fever   on admission ow grade fever Tmax 99.8, tachycardia hR 112   cbc showed leucocytosis 23k   CT abd showed Diffuse pancolonic wall thickening from the cecum to the rectum   consistent with colitis which may be infectious or inflammatory. Hyperemia of the appendix is likely reactive.  Diarrhea likely infective etiology   r/o C diff   f/u C diff, Stool ova parasite, gram stain, blood cultures, GI PCR   will start on flagyl 500mg Q8 hrs and PO vancomycin 500 mg TID   failed outpatient flagyl therapy   ID consult Dr Altamirano   continue with IV fluids  isolation precaution -watery diarrhea, associated with nausea, vomiting, low grade fever   -on admission ow grade fever Tmax 99.8, tachycardia hR 112   -cbc showed leucocytosis 23k   -CT abd showed Diffuse pancolonic wall thickening from the cecum to the rectum   consistent with colitis which may be infectious or inflammatory. Hyperemia of the appendix is likely reactive.  -C.diff positive  -failed outpatient flagyl therapy   -c/w flagyl 500mg Q8 hrs and PO vancomycin 500 mg TID   -continue with IV fluids  -isolation precaution  -ID consult Dr Altamirano

## 2018-10-08 NOTE — PROGRESS NOTE ADULT - PROBLEM SELECTOR PLAN 3
patient has h/o hemophilia   f/u factors levels -HB 10.8--> 9.1, may be dilutional   -as per patient recently had EGD, Colonoscopy and capsule endoscopy   -pt was told she had ulcers in small intestine   -Occult neg  -f/u Iron studies and gastric level  -GI consult

## 2018-10-08 NOTE — CONSULT NOTE ADULT - ATTENDING COMMENTS
Patient seen and examined, agree with above assessment and plan as transcribed above.    - Suspect Hypotension is due to volume loss, diarrhea and infection  - Abx per primary team  - F/U echo    Alen Kothari MD, FACC

## 2018-10-08 NOTE — PROGRESS NOTE ADULT - SUBJECTIVE AND OBJECTIVE BOX
Patient seen and examined at bedside  No acute events noted overnight  Case discussed with medical team    HPI:  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 As per patient she is having multiple episodes of watery diarrhea ( 10 - 12 episodes in 24 hrs) mixed with Bright red blood. Patient have intermitted episodes of Diarrhea since 1 week went to see her PCP underwent some test but no significant improvement in Diarrhea. Diarrhea is watery, foul smelling associated with Abdominal pain, fever, chills, nausea and NBNB vomiting. Patient recently travelled to [place], admitted for 10 days for bloody Diarrhea, underwent EGD, Colonoscopy and capsule Endoscopy, found to have ulcers in small intestine. Patient was discharged on oral flagyl, she took for 10 days and diarrhea resolved. [place] again. Patient denies chest pain, sob, dysuria, skin rashes, or dysuria. (07 Oct 2018 03:15)      PAST MEDICAL & SURGICAL HISTORY:  Fibromyalgia  GERD (gastroesophageal reflux disease)  Kidney stone: treated with lithotripsy  History of cataract surgery  H/O varicose vein stripping  Bladder tumor: surgically removed; benign  History of cholecystectomy      aspirin (Unknown)       MEDICATIONS  (STANDING):  dextrose 5% + sodium chloride 0.9%. 1000 milliLiter(s) (90 mL/Hr) IV Continuous <Continuous>  influenza   Vaccine 0.5 milliLiter(s) IntraMuscular once  iron sucrose IVPB 200 milliGRAM(s) IV Intermittent every 24 hours  lactobacillus acidophilus 1 Tablet(s) Oral every 8 hours  metroNIDAZOLE  IVPB 500 milliGRAM(s) IV Intermittent every 8 hours  metroNIDAZOLE  IVPB      vancomycin    Solution 500 milliGRAM(s) Oral every 6 hours    MEDICATIONS  (PRN):  acetaminophen   Tablet .. 650 milliGRAM(s) Oral every 6 hours PRN Temp greater or equal to 38C (100.4F)  acetaminophen  IVPB .. 1000 milliGRAM(s) IV Intermittent once PRN Mild Pain (1 - 3)  morphine  - Injectable 1 milliGRAM(s) IV Push every 6 hours PRN Moderate Pain (4 - 6)  ondansetron Injectable 4 milliGRAM(s) IV Push every 8 hours PRN Nausea and/or Vomiting      REVIEW OF SYSTEMS:  CONSTITUTIONAL: (+) malaise.   EYES: No acute change in vision   ENT:  No tinnitus  NECK: No stiffness  RESPIRATORY: No hemoptysis  CARDIOVASCULAR: No chest pain, palpitations, syncope  GASTROINTESTINAL: No hematemesis, diarrhea, melena, or hematochezia.  GENITOURINARY: No hematuria  NEUROLOGICAL: No headaches  LYMPH Nodes: No enlarged glands  ENDOCRINE: No heat or cold intolerance	    T(C): 36.8 (10-08-18 @ 05:21), Max: 38.1 (10-07-18 @ 15:32)  HR: 85 (10-08-18 @ 05:21) (76 - 96)  BP: 124/60 (10-08-18 @ 05:21) (106/82 - 130/77)  RR: 16 (10-08-18 @ 05:21) (16 - 18)  SpO2: 98% (10-08-18 @ 05:21) (98% - 100%)    PHYSICAL EXAMINATION:   Constitutional: ill appearing. NAD  HEENT: NC, AT  Neck:  Supple  Respiratory:  Adequate airflow b/l. Not using accessory muscles of respiration.  Cardiovascular:  S1 & S2 intact, no R/G, 2+ radial pulses b/l  Gastrointestinal: Soft, tender, ND, normoactive b.s., no organomegaly/RT/rigidity  Extremities: WWP  Neurological:  Alert and awake.  No acute focal motor deficits. Crude sensation intact.     Labs and imaging reviewed    LABS:                        9.1    19.1  )-----------( 294      ( 07 Oct 2018 07:20 )             28.4     10-07    137  |  107  |  4<L>  ----------------------------<  112<H>  3.6   |  22  |  0.46<L>    Ca    8.1<L>      07 Oct 2018 17:56  Phos  3.0     10-07  Mg     2.0     10-07    TPro  6.1  /  Alb  2.6<L>  /  TBili  0.4  /  DBili  x   /  AST  11  /  ALT  13  /  AlkPhos  45  10-07            CAPILLARY BLOOD GLUCOSE            LIVER FUNCTIONS - ( 07 Oct 2018 07:20 )  Alb: 2.6 g/dL / Pro: 6.1 g/dL / ALK PHOS: 45 U/L / ALT: 13 U/L DA / AST: 11 U/L / GGT: x               RADIOLOGY & ADDITIONAL STUDIES:

## 2018-10-08 NOTE — PROGRESS NOTE ADULT - SUBJECTIVE AND OBJECTIVE BOX
PGY 1 Note discussed with supervising resident and primary attending    Patient is a 78y old  Female who presents with a chief complaint of Diarrhea (08 Oct 2018 07:27)      INTERVAL HPI/OVERNIGHT EVENTS: No acute events reported overnight.    Today pt presents in no acute distress.  Pt found resting comfortably in bed.      MEDICATIONS  (STANDING):  dextrose 5% + sodium chloride 0.9%. 1000 milliLiter(s) (90 mL/Hr) IV Continuous <Continuous>  influenza   Vaccine 0.5 milliLiter(s) IntraMuscular once  iron sucrose IVPB 200 milliGRAM(s) IV Intermittent every 24 hours  lactobacillus acidophilus 1 Tablet(s) Oral every 8 hours  metroNIDAZOLE  IVPB 500 milliGRAM(s) IV Intermittent every 8 hours  metroNIDAZOLE  IVPB      vancomycin    Solution 500 milliGRAM(s) Oral every 6 hours    MEDICATIONS  (PRN):  acetaminophen   Tablet .. 650 milliGRAM(s) Oral every 6 hours PRN Temp greater or equal to 38C (100.4F)  acetaminophen  IVPB .. 1000 milliGRAM(s) IV Intermittent once PRN Mild Pain (1 - 3)  morphine  - Injectable 1 milliGRAM(s) IV Push every 6 hours PRN Moderate Pain (4 - 6)  ondansetron Injectable 4 milliGRAM(s) IV Push every 8 hours PRN Nausea and/or Vomiting      __________________________________________________  REVIEW OF SYSTEMS:    CONSTITUTIONAL: No fever,   EYES: no acute visual disturbances  NECK: No pain or stiffness  RESPIRATORY: No cough; No shortness of breath  CARDIOVASCULAR: No chest pain, no palpitations  GASTROINTESTINAL: No pain. No nausea or vomiting; No diarrhea   NEUROLOGICAL: No headache or numbness, no tremors  MUSCULOSKELETAL: No joint pain, no muscle pain      Vital Signs Last 24 Hrs  T(C): 36.8 (08 Oct 2018 05:21), Max: 38.1 (07 Oct 2018 15:32)  T(F): 98.2 (08 Oct 2018 05:21), Max: 100.6 (07 Oct 2018 15:32)  HR: 85 (08 Oct 2018 05:21) (76 - 96)  BP: 124/60 (08 Oct 2018 05:21) (106/82 - 130/77)  RR: 16 (08 Oct 2018 05:21) (16 - 18)  SpO2: 98% (08 Oct 2018 05:21) (98% - 100%)    ________________________________________________  PHYSICAL EXAM:  GENERAL: NAD  HEENT:Normocephalic;  conjunctivae and sclerae clear; moist mucous membranes;   NECK : supple  CHEST/LUNG: Clear to auscuitation bilaterally with good air entry   HEART: S1 S2  regular; no murmurs, gallops or rubs  ABDOMEN: Soft, Nontender, Nondistended; Bowel sounds present  EXTREMITIES: no cyanosis; no edema; no calf tenderness  NERVOUS SYSTEM:  Awake and alert; Oriented  to place, person and time ; no new deficits    _________________________________________________  LABS:                   RADIOLOGY & ADDITIONAL TESTS:    Imaging Personally Reviewed:  YES    Consultant(s) Notes Reviewed:   YES/    Care Discussed with Consultants :     Plan of care was discussed with patient and /or primary care giver; all questions and concerns were addressed and care was aligned with patient's wishes. PGY 1 Note discussed with supervising resident and primary attending    Patient is a 78y old  Female who presents with a chief complaint of Diarrhea (08 Oct 2018 07:27)      INTERVAL HPI/OVERNIGHT EVENTS: No acute events reported overnight.    Today pt presents in no acute distress.  Pt found resting comfortably in bed.  Pt continues to complain of mild abd discomfort.  No new complaints reported today.      MEDICATIONS  (STANDING):  dextrose 5% + sodium chloride 0.9%. 1000 milliLiter(s) (90 mL/Hr) IV Continuous <Continuous>  influenza   Vaccine 0.5 milliLiter(s) IntraMuscular once  iron sucrose IVPB 200 milliGRAM(s) IV Intermittent every 24 hours  lactobacillus acidophilus 1 Tablet(s) Oral every 8 hours  metroNIDAZOLE  IVPB 500 milliGRAM(s) IV Intermittent every 8 hours  metroNIDAZOLE  IVPB      vancomycin    Solution 500 milliGRAM(s) Oral every 6 hours    MEDICATIONS  (PRN):  acetaminophen   Tablet .. 650 milliGRAM(s) Oral every 6 hours PRN Temp greater or equal to 38C (100.4F)  acetaminophen  IVPB .. 1000 milliGRAM(s) IV Intermittent once PRN Mild Pain (1 - 3)  morphine  - Injectable 1 milliGRAM(s) IV Push every 6 hours PRN Moderate Pain (4 - 6)  ondansetron Injectable 4 milliGRAM(s) IV Push every 8 hours PRN Nausea and/or Vomiting      __________________________________________________  REVIEW OF SYSTEMS:    CONSTITUTIONAL: No fever,   EYES: no acute visual disturbances  NECK: No pain or stiffness  RESPIRATORY: No cough; No shortness of breath  CARDIOVASCULAR: No chest pain, no palpitations  GASTROINTESTINAL: No pain. No nausea or vomiting; No diarrhea   NEUROLOGICAL: No headache or numbness, no tremors  MUSCULOSKELETAL: No joint pain, no muscle pain    Vital Signs Last 24 Hrs  T(C): 36.8 (08 Oct 2018 05:21), Max: 38.1 (07 Oct 2018 15:32)  T(F): 98.2 (08 Oct 2018 05:21), Max: 100.6 (07 Oct 2018 15:32)  HR: 85 (08 Oct 2018 05:21) (76 - 96)  BP: 124/60 (08 Oct 2018 05:21) (106/82 - 130/77)  RR: 16 (08 Oct 2018 05:21) (16 - 18)  SpO2: 98% (08 Oct 2018 05:21) (98% - 100%)    ________________________________________________  PHYSICAL EXAM:  GENERAL: NAD  HEENT:Normocephalic;  conjunctivae and sclerae clear; moist mucous membranes;   NECK : supple  CHEST/LUNG: Clear to auscultation bilaterally with good air entry   HEART: S1 S2  regular; no murmurs, gallops or rubs  ABDOMEN: Soft, mild diffuse tenderness to palpation , Nondistended; Bowel sounds present  EXTREMITIES: no cyanosis; no edema; no calf tenderness  NERVOUS SYSTEM:  Awake and alert; Oriented  to place, person and time ; no new deficits    _________________________________________________  LABS:                 RADIOLOGY & ADDITIONAL TESTS:    Imaging Personally Reviewed:  YES    Consultant(s) Notes Reviewed:   YES    Care Discussed with Consultants :     Plan of care was discussed with patient and /or primary care giver; all questions and concerns were addressed and care was aligned with patient's wishes.

## 2018-10-08 NOTE — CONSULT NOTE ADULT - ASSESSMENT
78F w/C. diff colitis and abd distension.  -given h/o and symptoms agree with IV Flagyl 500 mg TID and PO vancomycin 500 mg QID  -would tx for at least 14 days w/PO vanco  -would obtain AXR for baseline  -primary team should do daily abd exams and if distension worsens with rising leukocytosis would obtain surgical consult for evaluation of possible toxic megacolon

## 2018-10-08 NOTE — PROGRESS NOTE ADULT - ASSESSMENT
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 As per patient she is having multiple episodes of watery diarrhea ( 10 - 12 episodes in 24 hrs) mixed with Bright red blood. Patient have intermitted episodes of Diarrhea since 1 week went to see her PCP underwent some test but no significant improvement in Diarrhea. Diarrhea is watery, foul smelling associated with Abdominal pain, fever, chills, nausea and NBNB vomiting. Patient recently travelled to [place], admitted for 10 days for bloody Diarrhea, underwent EGD, Colonoscopy and capsule Endoscopy, found to have ulcers in small intestine. Patient was discharged on oral flagyl, she took for 10 days and diarrhea resolved. patient came to back [place] and having [place] again. Patient denies chest pain, sob, dysuria, skin rashes, or dysuria.     on admission patient is low grade fever Tmax 99.8, tachycardiac, , /65, CBC 23K, HB 10.5, . lactate normal   CT showed [pancoliitis 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 As per patient she is having multiple episodes of watery diarrhea ( 10 - 12 episodes in 24 hrs) mixed with Bright red blood. Patient have intermitted episodes of Diarrhea since 1 week went to see her PCP underwent some test but no significant improvement in Diarrhea. Diarrhea is watery, foul smelling associated with Abdominal pain, fever, chills, nausea and NBNB vomiting. Patient recently travelled to [place], admitted for 10 days for bloody Diarrhea, underwent EGD, Colonoscopy and capsule Endoscopy, found to have ulcers in small intestine. Patient was discharged on oral flagyl, she took for 10 days and diarrhea resolved. patient came to back [place] and [place] again. Patient denies chest pain, sob, dysuria, skin rashes, or dysuria.     on admission patient is low grade fever Tmax 99.8, tachycardiac, , /65, CBC 23K, HB 10.5, . lactate normal   CT showed pancoliitis   Cdiff pos

## 2018-10-08 NOTE — PROGRESS NOTE ADULT - ASSESSMENT
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

## 2018-10-09 LAB
ANION GAP SERPL CALC-SCNC: 9 MMOL/L — SIGNIFICANT CHANGE UP (ref 5–17)
BASOPHILS # BLD AUTO: 0.1 K/UL — SIGNIFICANT CHANGE UP (ref 0–0.2)
BASOPHILS NFR BLD AUTO: 1.2 % — SIGNIFICANT CHANGE UP (ref 0–2)
BUN SERPL-MCNC: <1 MG/DL — LOW (ref 7–18)
CALCIUM SERPL-MCNC: 8.5 MG/DL — SIGNIFICANT CHANGE UP (ref 8.4–10.5)
CHLORIDE SERPL-SCNC: 106 MMOL/L — SIGNIFICANT CHANGE UP (ref 96–108)
CO2 SERPL-SCNC: 25 MMOL/L — SIGNIFICANT CHANGE UP (ref 22–31)
CREAT SERPL-MCNC: 0.43 MG/DL — LOW (ref 0.5–1.3)
EOSINOPHIL # BLD AUTO: 0.2 K/UL — SIGNIFICANT CHANGE UP (ref 0–0.5)
EOSINOPHIL NFR BLD AUTO: 4.2 % — SIGNIFICANT CHANGE UP (ref 0–6)
GASTRIN SERPL-MCNC: 12 PG/ML — SIGNIFICANT CHANGE UP
GLUCOSE SERPL-MCNC: 115 MG/DL — HIGH (ref 70–99)
HCT VFR BLD CALC: 30.4 % — LOW (ref 34.5–45)
HGB BLD-MCNC: 9.3 G/DL — LOW (ref 11.5–15.5)
LYMPHOCYTES # BLD AUTO: 1.2 K/UL — SIGNIFICANT CHANGE UP (ref 1–3.3)
LYMPHOCYTES # BLD AUTO: 21.4 % — SIGNIFICANT CHANGE UP (ref 13–44)
MAGNESIUM SERPL-MCNC: 1.9 MG/DL — SIGNIFICANT CHANGE UP (ref 1.6–2.6)
MCHC RBC-ENTMCNC: 28.9 PG — SIGNIFICANT CHANGE UP (ref 27–34)
MCHC RBC-ENTMCNC: 30.6 GM/DL — LOW (ref 32–36)
MCV RBC AUTO: 94.5 FL — SIGNIFICANT CHANGE UP (ref 80–100)
MONOCYTES # BLD AUTO: 0.7 K/UL — SIGNIFICANT CHANGE UP (ref 0–0.9)
MONOCYTES NFR BLD AUTO: 11.9 % — SIGNIFICANT CHANGE UP (ref 2–14)
NEUTROPHILS # BLD AUTO: 3.4 K/UL — SIGNIFICANT CHANGE UP (ref 1.8–7.4)
NEUTROPHILS NFR BLD AUTO: 61.3 % — SIGNIFICANT CHANGE UP (ref 43–77)
PHOSPHATE SERPL-MCNC: 2.3 MG/DL — LOW (ref 2.5–4.5)
PLATELET # BLD AUTO: 326 K/UL — SIGNIFICANT CHANGE UP (ref 150–400)
POTASSIUM SERPL-MCNC: 3.7 MMOL/L — SIGNIFICANT CHANGE UP (ref 3.5–5.3)
POTASSIUM SERPL-SCNC: 3.7 MMOL/L — SIGNIFICANT CHANGE UP (ref 3.5–5.3)
RBC # BLD: 3.22 M/UL — LOW (ref 3.8–5.2)
RBC # FLD: 15.4 % — HIGH (ref 10.3–14.5)
SODIUM SERPL-SCNC: 140 MMOL/L — SIGNIFICANT CHANGE UP (ref 135–145)
WBC # BLD: 5.5 K/UL — SIGNIFICANT CHANGE UP (ref 3.8–10.5)
WBC # FLD AUTO: 5.5 K/UL — SIGNIFICANT CHANGE UP (ref 3.8–10.5)

## 2018-10-09 PROCEDURE — 74018 RADEX ABDOMEN 1 VIEW: CPT | Mod: 26

## 2018-10-09 RX ORDER — POTASSIUM PHOSPHATE, MONOBASIC POTASSIUM PHOSPHATE, DIBASIC 236; 224 MG/ML; MG/ML
15 INJECTION, SOLUTION INTRAVENOUS ONCE
Qty: 0 | Refills: 0 | Status: COMPLETED | OUTPATIENT
Start: 2018-10-09 | End: 2018-10-09

## 2018-10-09 RX ORDER — CHOLECALCIFEROL (VITAMIN D3) 125 MCG
1000 CAPSULE ORAL DAILY
Qty: 0 | Refills: 0 | Status: DISCONTINUED | OUTPATIENT
Start: 2018-10-09 | End: 2018-10-17

## 2018-10-09 RX ADMIN — Medication 500 MILLIGRAM(S): at 23:54

## 2018-10-09 RX ADMIN — Medication 500 MILLIGRAM(S): at 17:20

## 2018-10-09 RX ADMIN — Medication 500 MILLIGRAM(S): at 11:42

## 2018-10-09 RX ADMIN — Medication 1000 UNIT(S): at 11:42

## 2018-10-09 RX ADMIN — Medication 100 MILLIGRAM(S): at 05:53

## 2018-10-09 RX ADMIN — Medication 500 MILLIGRAM(S): at 05:53

## 2018-10-09 RX ADMIN — POTASSIUM PHOSPHATE, MONOBASIC POTASSIUM PHOSPHATE, DIBASIC 62.5 MILLIMOLE(S): 236; 224 INJECTION, SOLUTION INTRAVENOUS at 17:20

## 2018-10-09 RX ADMIN — Medication 1 TABLET(S): at 22:43

## 2018-10-09 RX ADMIN — Medication 1 TABLET(S): at 14:33

## 2018-10-09 RX ADMIN — Medication 1 TABLET(S): at 05:53

## 2018-10-09 NOTE — PROGRESS NOTE ADULT - PROBLEM SELECTOR PLAN 1
-Diarrhea improving- 1 episode overnight   -cbc showed leucocytosis of 8.8  -CT abd showed Diffuse pancolonic wall thickening from the cecum to the rectum   consistent with colitis which may be infectious or inflammatory. Hyperemia of the appendix is likely reactive.  -C.diff positive  -c/w PO vancomycin 500 mg TID per ID recs  -flagyl d/c'd this AM  -continue with IV fluids  -isolation precaution  -ID consult Dr Altamirano  -GI: Dr. Andrade

## 2018-10-09 NOTE — PROGRESS NOTE ADULT - PROBLEM SELECTOR PLAN 3
-Stable- no signs of active bleed  -as per patient recently had EGD, Colonoscopy and capsule endoscopy   -pt was told she had ulcers in small intestine   -Occult neg  -Iron studies consistent w/ anemia of chronic disease

## 2018-10-09 NOTE — PROGRESS NOTE ADULT - SUBJECTIVE AND OBJECTIVE BOX
PGY 1 Note discussed with supervising resident and primary attending    Patient is a 78y old  Female who presents with a chief complaint of Diarrhea (08 Oct 2018 19:10)    INTERVAL HPI/OVERNIGHT EVENTS: No acute events reported overnight.  Today pt presents in no acute distress.  Pt found resting comfortably in bed.  Pt reports 1 episode of diarrhea overnight- more formed stool than prior.  o episodes reported this AM.  Pt reports appetite.   No new complaints.      MEDICATIONS  (STANDING):  cholecalciferol 1000 Unit(s) Oral daily  dextrose 5% + sodium chloride 0.9%. 1000 milliLiter(s) (90 mL/Hr) IV Continuous <Continuous>  influenza   Vaccine 0.5 milliLiter(s) IntraMuscular once  lactobacillus acidophilus 1 Tablet(s) Oral every 8 hours  vancomycin    Solution 500 milliGRAM(s) Oral every 6 hours    MEDICATIONS  (PRN):  acetaminophen   Tablet .. 650 milliGRAM(s) Oral every 6 hours PRN Temp greater or equal to 38C (100.4F)  acetaminophen  IVPB .. 1000 milliGRAM(s) IV Intermittent once PRN Mild Pain (1 - 3)  morphine  - Injectable 1 milliGRAM(s) IV Push every 6 hours PRN Moderate Pain (4 - 6)  ondansetron Injectable 4 milliGRAM(s) IV Push every 8 hours PRN Nausea and/or Vomiting      __________________________________________________  REVIEW OF SYSTEMS:    CONSTITUTIONAL: No fever,   EYES: no acute visual disturbances  NECK: No pain or stiffness  RESPIRATORY: No cough; No shortness of breath  CARDIOVASCULAR: No chest pain, no palpitations  GASTROINTESTINAL: No pain. No nausea or vomiting; No diarrhea   NEUROLOGICAL: No headache or numbness, no tremors  MUSCULOSKELETAL: No joint pain, no muscle pain        Vital Signs Last 24 Hrs  T(C): 36.9 (09 Oct 2018 04:29), Max: 37 (08 Oct 2018 21:07)  T(F): 98.5 (09 Oct 2018 04:29), Max: 98.6 (08 Oct 2018 21:07)  HR: 85 (09 Oct 2018 04:29) (82 - 86)  BP: 115/66 (09 Oct 2018 04:29) (115/66 - 140/66)  RR: 18 (09 Oct 2018 04:29) (17 - 18)  SpO2: 98% (09 Oct 2018 04:29) (98% - 99%)    ________________________________________________  PHYSICAL EXAM:  GENERAL: NAD  HEENT: Normocephalic;  conjunctivae and sclerae clear; moist mucous membranes;   NECK : supple  CHEST/LUNG: Clear to auscuitation bilaterally with good air entry   HEART: S1 S2  regular; no murmurs, gallops or rubs  ABDOMEN: Soft, Nontender, Nondistended; Bowel sounds present  EXTREMITIES: no cyanosis; no edema; no calf tenderness  NERVOUS SYSTEM:  Awake and alert; Oriented  to place, person and time ; no new deficits    _________________________________________________  LABS:                   RADIOLOGY & ADDITIONAL TESTS:    Imaging Personally Reviewed:  YES  Consultant(s) Notes Reviewed:   YES    Care Discussed with Consultants :     Plan of care was discussed with patient and /or primary care giver; all questions and concerns were addressed and care was aligned with patient's wishes.

## 2018-10-09 NOTE — PROGRESS NOTE ADULT - PROBLEM SELECTOR PLAN 2
-Resolved  -on admission ow grade fever Tmax 99.8, tachycardia hR 112   -cbc showed leucocytosis 23k   -CT abd showed Diffuse pancolonic wall thickening from the cecum to the rectum   consistent with colitis which may be infectious or inflammatory. Hyperemia of the appendix is likely reactive.  -C.diff positive  -failed outpatient flagyl therapy   -c/w flagyl 500mg Q8 hrs and PO vancomycin 500 mg TID   -continue with IV fluids  -isolation precaution  -ID consult Dr Altamirano

## 2018-10-09 NOTE — PROGRESS NOTE ADULT - SUBJECTIVE AND OBJECTIVE BOX
Patient is seen and examined at the bed side, is afebrile. She has four episodes of diarrhea today, no abdominal pain. The Leukocytosis trended down to normal.       REVIEW OF SYSTEMS: All other review systems are negative      ALLERGIES: NKDA        Vital Signs Last 24 Hrs  T(C): 36.8 (09 Oct 2018 14:03), Max: 37 (08 Oct 2018 21:07)  T(F): 98.2 (09 Oct 2018 14:03), Max: 98.6 (08 Oct 2018 21:07)  HR: 82 (09 Oct 2018 14:03) (82 - 86)  BP: 126/67 (09 Oct 2018 14:03) (115/66 - 135/62)  BP(mean): --  RR: 16 (09 Oct 2018 14:03) (16 - 18)  SpO2: 97% (09 Oct 2018 14:03) (97% - 98%)        PHYSICAL EXAM:  GENERAL: Not in distress  CHEST/LUNG: Air entry bilaterally   HEART: s1 and s2 present  ABDOMEN: Nontender, and Nondistended  EXTREMITIES:  No pedal edema  CNS: AAOX3        LABS:                        9.3    5.5   )-----------( 326      ( 09 Oct 2018 10:41 )             30.4                           9.5    8.8   )-----------( 315      ( 08 Oct 2018 12:12 )             30.4                           9.1    19.1  )-----------( 294      ( 07 Oct 2018 07:20 )             28.4           10-09    140  |  106  |  <1<L>  ----------------------------<  115<H>  3.7   |  25  |  0.43<L>    Ca    8.5      09 Oct 2018 10:41  Phos  2.3     10-09  Mg     1.9     10-09      10-08    139  |  106  |  3<L>  ----------------------------<  102<H>  3.1<L>   |  26  |  0.50    Ca    8.4      08 Oct 2018 12:12  Phos  3.0     10-07  Mg     2.0     10-07    TPro  6.1  /  Alb  2.6<L>  /  TBili  0.4  /  DBili  x   /  AST  11  /  ALT  13  /  AlkPhos  45  10-07      TPro  6.1  /  Alb  2.6<L>  /  TBili  0.4  /  DBili  x   /  AST  11  /  ALT  13  /  AlkPhos  45  10-07    MEDICATIONS  (STANDING):  cholecalciferol 1000 Unit(s) Oral daily  dextrose 5% + sodium chloride 0.9%. 1000 milliLiter(s) (90 mL/Hr) IV Continuous <Continuous>  influenza   Vaccine 0.5 milliLiter(s) IntraMuscular once  lactobacillus acidophilus 1 Tablet(s) Oral every 8 hours  potassium phosphate IVPB 15 milliMole(s) IV Intermittent once  vancomycin    Solution 500 milliGRAM(s) Oral every 6 hours    MEDICATIONS  (PRN):  acetaminophen   Tablet .. 650 milliGRAM(s) Oral every 6 hours PRN Temp greater or equal to 38C (100.4F)  acetaminophen  IVPB .. 1000 milliGRAM(s) IV Intermittent once PRN Mild Pain (1 - 3)  morphine  - Injectable 1 milliGRAM(s) IV Push every 6 hours PRN Moderate Pain (4 - 6)  ondansetron Injectable 4 milliGRAM(s) IV Push every 8 hours PRN Nausea and/or Vomiting          RADIOLOGY & ADDITIONAL TESTS:    10/6/18: CT Abdomen and Pelvis w/ IV Cont (10.06.18 @ 23:53) Pelvic nodes versus cystic neoplasms. Diffuse colitis,  Thickening of the colon, primarily involving the descending and descending colons, down to the rectum. Patient is seen and examined at the bed side, is afebrile. The diarrhea slowed down. But the stool is positive for AFB, work up not finalized yet, spoken with Core LAb.      REVIEW OF SYSTEMS: All other review systems are negative      ALLERGIES: NKDA        Vital Signs Last 24 Hrs  T(C): 36.8 (09 Oct 2018 14:03), Max: 37 (08 Oct 2018 21:07)  T(F): 98.2 (09 Oct 2018 14:03), Max: 98.6 (08 Oct 2018 21:07)  HR: 82 (09 Oct 2018 14:03) (82 - 86)  BP: 126/67 (09 Oct 2018 14:03) (115/66 - 135/62)  BP(mean): --  RR: 16 (09 Oct 2018 14:03) (16 - 18)  SpO2: 97% (09 Oct 2018 14:03) (97% - 98%)        PHYSICAL EXAM:  GENERAL: Not in distress  CHEST/LUNG: Air entry bilaterally   HEART: s1 and s2 present  ABDOMEN: Nontender, and Nondistended  EXTREMITIES:  No pedal edema  CNS: AAOX3        LABS:                        9.3    5.5   )-----------( 326      ( 09 Oct 2018 10:41 )             30.4                           9.5    8.8   )-----------( 315      ( 08 Oct 2018 12:12 )             30.4                           9.1    19.1  )-----------( 294      ( 07 Oct 2018 07:20 )             28.4           10-09    140  |  106  |  <1<L>  ----------------------------<  115<H>  3.7   |  25  |  0.43<L>    Ca    8.5      09 Oct 2018 10:41  Phos  2.3     10-09  Mg     1.9     10-09      10-08    139  |  106  |  3<L>  ----------------------------<  102<H>  3.1<L>   |  26  |  0.50    Ca    8.4      08 Oct 2018 12:12  Phos  3.0     10-07  Mg     2.0     10-07    TPro  6.1  /  Alb  2.6<L>  /  TBili  0.4  /  DBili  x   /  AST  11  /  ALT  13  /  AlkPhos  45  10-07      TPro  6.1  /  Alb  2.6<L>  /  TBili  0.4  /  DBili  x   /  AST  11  /  ALT  13  /  AlkPhos  45  10-07    MEDICATIONS  (STANDING):  cholecalciferol 1000 Unit(s) Oral daily  dextrose 5% + sodium chloride 0.9%. 1000 milliLiter(s) (90 mL/Hr) IV Continuous <Continuous>  influenza   Vaccine 0.5 milliLiter(s) IntraMuscular once  lactobacillus acidophilus 1 Tablet(s) Oral every 8 hours  potassium phosphate IVPB 15 milliMole(s) IV Intermittent once  vancomycin    Solution 500 milliGRAM(s) Oral every 6 hours    MEDICATIONS  (PRN):  acetaminophen   Tablet .. 650 milliGRAM(s) Oral every 6 hours PRN Temp greater or equal to 38C (100.4F)  acetaminophen  IVPB .. 1000 milliGRAM(s) IV Intermittent once PRN Mild Pain (1 - 3)  morphine  - Injectable 1 milliGRAM(s) IV Push every 6 hours PRN Moderate Pain (4 - 6)  ondansetron Injectable 4 milliGRAM(s) IV Push every 8 hours PRN Nausea and/or Vomiting          RADIOLOGY & ADDITIONAL TESTS:    10/6/18: CT Abdomen and Pelvis w/ IV Cont (10.06.18 @ 23:53) Pelvic nodes versus cystic neoplasms. Diffuse colitis,  Thickening of the colon, primarily involving the descending and descending colons, down to the rectum.

## 2018-10-09 NOTE — PROGRESS NOTE ADULT - SUBJECTIVE AND OBJECTIVE BOX
Patient denies chest pain or shortness of breath.   Review of systems otherwise (-)  	  MEDICATIONS:  MEDICATIONS  (STANDING):  cholecalciferol 1000 Unit(s) Oral daily  dextrose 5% + sodium chloride 0.9%. 1000 milliLiter(s) (90 mL/Hr) IV Continuous <Continuous>  influenza   Vaccine 0.5 milliLiter(s) IntraMuscular once  lactobacillus acidophilus 1 Tablet(s) Oral every 8 hours  potassium phosphate IVPB 15 milliMole(s) IV Intermittent once  vancomycin    Solution 500 milliGRAM(s) Oral every 6 hours      LABS:	 	    CARDIAC MARKERS:                                9.3    5.5   )-----------( 326      ( 09 Oct 2018 10:41 )             30.4     Hemoglobin: 9.3 g/dL (10-09 @ 10:41)  Hemoglobin: 9.5 g/dL (10-08 @ 12:12)  Hemoglobin: 9.1 g/dL (10-07 @ 07:20)  Hemoglobin: 10.8 g/dL (10-06 @ 20:18)      10-09    140  |  106  |  <1<L>  ----------------------------<  115<H>  3.7   |  25  |  0.43<L>    Ca    8.5      09 Oct 2018 10:41  Phos  2.3     10-09  Mg     1.9     10-09      Creatinine Trend: 0.43<--, 0.50<--, 0.46<--, 0.46<--, 0.59<--        PHYSICAL EXAM:  T(C): 36.9 (10-09-18 @ 04:29), Max: 37 (10-08-18 @ 21:07)  HR: 85 (10-09-18 @ 04:29) (82 - 86)  BP: 115/66 (10-09-18 @ 04:29) (115/66 - 140/66)  RR: 18 (10-09-18 @ 04:29) (17 - 18)  SpO2: 98% (10-09-18 @ 04:29) (98% - 99%)  Wt(kg): --  I&O's Summary        Gen: Appears well in NAD  HEENT:  (-)icterus (-)pallor  CV: N S1 S2 1/6 NANCI (+)2 Pulses B/l  Resp:  Clear to ausculatation B/L, normal effort  GI: (+) BS Soft, NT, ND  Lymph:  (-)Edema, (-)obvious lymphadenopathy  Skin: Warm to touch, Normal turgor  Psych: Appropriate mood and affect        ASSESSMENT/PLAN: 	78y  Female rom home with PMHx of  diverticulosis,  PUD,  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 As per patient she is having multiple episodes of watery diarrhea  and hypotension    - Likely due tyo acute volume loss  - f/u echo  - If ech ownl  - No need for further inpatient cardiac work up.    Alen Kothari MD, FACC

## 2018-10-09 NOTE — PROGRESS NOTE ADULT - ASSESSMENT
Patient is a 78y old  Female who presents with a chief complaint of Diarrhea which  is watery, foul smelling associated with Abdominal pain, fever, chills, nausea and NBNB vomiting. Patient recently travelled to [place], admitted for 10 days in  [facility] for bloody Diarrhea, underwent EGD, Colonoscopy and capsule Endoscopy, found to have ulcers in small intestine. Patient was discharged on oral flagyl, she took for 10 days and diarrhea resolved. [place] about [place]  ago and having diarrhea again. In the ER, she found to have low grade fever, tachycardia, Leukocytosis and diffuse colitis on CT abd/pelvis. The stool for C.difficlie toxin PCR is positive. She has started on oral Vancomycin and ID flagyl. The ID consult  requested to assist with further evaluation and antibiotic  management.       # Diffuse colitis  # C.difficile colitis    would recommend:    1. Discontinue IV Flagyl  2. Continue oral vancomycin X total of 14 days  3. Monitor electrolytes and supplement as needed in the setting of diarrhea  4. Contact Isolation until diarrhea resolved   5. Avoid systemic antibiotics if possible    d/w patient and Nursing staff    will follow the patient with you Patient is a 78y old  Female who presents with a chief complaint of Diarrhea which  is watery, foul smelling associated with Abdominal pain, fever, chills, nausea and NBNB vomiting. Patient recently travelled to [place], admitted for 10 days in  [facility] for bloody Diarrhea, underwent EGD, Colonoscopy and capsule Endoscopy, found to have ulcers in small intestine. Patient was discharged on oral flagyl, she took for 10 days and diarrhea resolved. [place] back to [place] about [place]  ago and having diarrhea again. In the ER, she found to have low grade fever, tachycardia, Leukocytosis and diffuse colitis on CT abd/pelvis. The stool for C.difficlie toxin PCR is positive. She has started on oral Vancomycin and ID flagyl. The ID consult  requested to assist with further evaluation and antibiotic  management.       # Diffuse colitis  # C.difficile colitis  # AFB positive ib stool    would recommend:    1. Follow up final AFB gene probe  2. Continue oral vancomycin X total of 14 days  3. Monitor electrolytes and supplement as needed in the setting of diarrhea  4. Contact Isolation until diarrhea resolved   5. Avoid systemic antibiotics if possible    d/w patient, Core LAb, House and Nursing staff    will follow the patient with you

## 2018-10-09 NOTE — PROGRESS NOTE ADULT - ASSESSMENT
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 As per patient she is having multiple episodes of watery diarrhea ( 10 - 12 episodes in 24 hrs) mixed with Bright red blood. Patient have intermitted episodes of Diarrhea since 1 week went to see her PCP underwent some test but no significant improvement in Diarrhea. Diarrhea is watery, foul smelling associated with Abdominal pain, fever, chills, nausea and NBNB vomiting. Patient recently travelled to [place], admitted for 10 days for bloody Diarrhea, underwent EGD, Colonoscopy and capsule Endoscopy, found to have ulcers in small intestine. Patient was discharged on oral flagyl, she took for 10 days and diarrhea resolved. patient came to back [place] and having diarrhea again. Patient denies chest pain, sob, dysuria, skin rashes, or dysuria.     on admission patient is low grade fever Tmax 99.8, tachycardiac, , /65, CBC 23K, HB 10.5, . lactate normal   CT showed pancoliitis   Cdiff pos

## 2018-10-10 DIAGNOSIS — R85.5 ABNORMAL MICROBIOLOGICAL FINDINGS IN SPECIMENS FROM DIGESTIVE ORGANS AND ABDOMINAL CAVITY: ICD-10-CM

## 2018-10-10 LAB
ANION GAP SERPL CALC-SCNC: 6 MMOL/L — SIGNIFICANT CHANGE UP (ref 5–17)
BASOPHILS # BLD AUTO: 0.1 K/UL — SIGNIFICANT CHANGE UP (ref 0–0.2)
BASOPHILS NFR BLD AUTO: 2.2 % — HIGH (ref 0–2)
BUN SERPL-MCNC: <1 MG/DL — LOW (ref 7–18)
CALCIUM SERPL-MCNC: 8.8 MG/DL — SIGNIFICANT CHANGE UP (ref 8.4–10.5)
CHLORIDE SERPL-SCNC: 104 MMOL/L — SIGNIFICANT CHANGE UP (ref 96–108)
CO2 SERPL-SCNC: 27 MMOL/L — SIGNIFICANT CHANGE UP (ref 22–31)
CREAT SERPL-MCNC: 0.44 MG/DL — LOW (ref 0.5–1.3)
CULTURE RESULTS: SIGNIFICANT CHANGE UP
CULTURE RESULTS: SIGNIFICANT CHANGE UP
EOSINOPHIL # BLD AUTO: 0.3 K/UL — SIGNIFICANT CHANGE UP (ref 0–0.5)
EOSINOPHIL NFR BLD AUTO: 5.7 % — SIGNIFICANT CHANGE UP (ref 0–6)
GLUCOSE SERPL-MCNC: 93 MG/DL — SIGNIFICANT CHANGE UP (ref 70–99)
HCT VFR BLD CALC: 32.3 % — LOW (ref 34.5–45)
HGB BLD-MCNC: 10.1 G/DL — LOW (ref 11.5–15.5)
HIV 1+2 AB+HIV1 P24 AG SERPL QL IA: SIGNIFICANT CHANGE UP
HIV 1+2 AB+HIV1 P24 AG SERPL QL IA: SIGNIFICANT CHANGE UP
LYMPHOCYTES # BLD AUTO: 1.7 K/UL — SIGNIFICANT CHANGE UP (ref 1–3.3)
LYMPHOCYTES # BLD AUTO: 32.5 % — SIGNIFICANT CHANGE UP (ref 13–44)
MAGNESIUM SERPL-MCNC: 2 MG/DL — SIGNIFICANT CHANGE UP (ref 1.6–2.6)
MCHC RBC-ENTMCNC: 28.6 PG — SIGNIFICANT CHANGE UP (ref 27–34)
MCHC RBC-ENTMCNC: 31.2 GM/DL — LOW (ref 32–36)
MCV RBC AUTO: 91.4 FL — SIGNIFICANT CHANGE UP (ref 80–100)
MONOCYTES # BLD AUTO: 0.8 K/UL — SIGNIFICANT CHANGE UP (ref 0–0.9)
MONOCYTES NFR BLD AUTO: 15.2 % — HIGH (ref 2–14)
NEUTROPHILS # BLD AUTO: 2.4 K/UL — SIGNIFICANT CHANGE UP (ref 1.8–7.4)
NEUTROPHILS NFR BLD AUTO: 44.4 % — SIGNIFICANT CHANGE UP (ref 43–77)
PHOSPHATE SERPL-MCNC: 4 MG/DL — SIGNIFICANT CHANGE UP (ref 2.5–4.5)
PLATELET # BLD AUTO: 395 K/UL — SIGNIFICANT CHANGE UP (ref 150–400)
POTASSIUM SERPL-MCNC: 4.1 MMOL/L — SIGNIFICANT CHANGE UP (ref 3.5–5.3)
POTASSIUM SERPL-SCNC: 4.1 MMOL/L — SIGNIFICANT CHANGE UP (ref 3.5–5.3)
RBC # BLD: 3.53 M/UL — LOW (ref 3.8–5.2)
RBC # FLD: 15.3 % — HIGH (ref 10.3–14.5)
SODIUM SERPL-SCNC: 137 MMOL/L — SIGNIFICANT CHANGE UP (ref 135–145)
SPECIMEN SOURCE: SIGNIFICANT CHANGE UP
SPECIMEN SOURCE: SIGNIFICANT CHANGE UP
WBC # BLD: 5.4 K/UL — SIGNIFICANT CHANGE UP (ref 3.8–10.5)
WBC # FLD AUTO: 5.4 K/UL — SIGNIFICANT CHANGE UP (ref 3.8–10.5)

## 2018-10-10 PROCEDURE — 71045 X-RAY EXAM CHEST 1 VIEW: CPT | Mod: 26

## 2018-10-10 RX ADMIN — Medication 500 MILLIGRAM(S): at 11:44

## 2018-10-10 RX ADMIN — Medication 1 TABLET(S): at 05:24

## 2018-10-10 RX ADMIN — Medication 1000 UNIT(S): at 11:44

## 2018-10-10 RX ADMIN — Medication 500 MILLIGRAM(S): at 17:37

## 2018-10-10 RX ADMIN — Medication 500 MILLIGRAM(S): at 05:25

## 2018-10-10 RX ADMIN — Medication 1 TABLET(S): at 13:44

## 2018-10-10 RX ADMIN — Medication 1 TABLET(S): at 22:37

## 2018-10-10 NOTE — PROGRESS NOTE ADULT - ASSESSMENT
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 As per patient she is having multiple episodes of watery diarrhea ( 10 - 12 episodes in 24 hrs) mixed with Bright red blood. Patient have intermitted episodes of Diarrhea since 1 week went to see her PCP underwent some test but no significant improvement in Diarrhea. Diarrhea is watery, foul smelling associated with Abdominal pain, fever, chills, nausea and NBNB vomiting. Patient recently travelled to [place], admitted for 10 days for bloody Diarrhea, underwent EGD, Colonoscopy and capsule Endoscopy, found to have ulcers in small intestine. Patient was discharged on oral flagyl, she took for 10 days and diarrhea resolved. patient came to back [place] and having diarrhea again. Patient denies chest pain, sob, dysuria, skin rashes, or dysuria.     on admission patient is low grade fever Tmax 99.8, tachycardiac, , /65, CBC 23K, HB 10.5, . lactate normal   CT showed pancoliitis   Cdiff pos  Mod AFB in stool

## 2018-10-10 NOTE — PROGRESS NOTE ADULT - PROBLEM SELECTOR PLAN 3
-Resolved  -on admission ow grade fever Tmax 99.8, tachycardia hR 112   -cbc showed leucocytosis 23k   -CT abd showed Diffuse pancolonic wall thickening from the cecum to the rectum   consistent with colitis which may be infectious or inflammatory. Hyperemia of the appendix is likely reactive.  -C.diff positive  -failed outpatient flagyl therapy   -c/w flagyl 500mg Q8 hrs and PO vancomycin 500 mg TID   -continue with IV fluids  -isolation precaution  -ID consult Dr Altamirano -Resolved  -on admission ow grade fever Tmax 99.8, tachycardia hR 112   -cbc showed leucocytosis 23k   -CT abd showed Diffuse pancolonic wall thickening from the cecum to the rectum   consistent with colitis which may be infectious or inflammatory. Hyperemia of the appendix is likely reactive.  -C.diff positive  -failed outpatient flagyl therapy   -c/w flagyl 500mg Q8 hrs and PO vancomycin 500 mg TID   -continue with IV fluids  -Isolation precaution  -ID: consult Dr Altamirano

## 2018-10-10 NOTE — PROGRESS NOTE ADULT - SUBJECTIVE AND OBJECTIVE BOX
PGY 1 Note discussed with supervising resident and primary attending    Patient is a 78y old  Female who presents with a chief complaint of Diarrhea (08 Oct 2018 19:10)    INTERVAL HPI/OVERNIGHT EVENTS: No acute events reported overnight.  Today pt presents in no acute distress.  Pt found resting comfortably in bed.  No episodes of diarrhea overnight.  No new complaints today.  Pt tolerating diet well    MEDICATIONS  (STANDING):  cholecalciferol 1000 Unit(s) Oral daily  dextrose 5% + sodium chloride 0.9%. 1000 milliLiter(s) (90 mL/Hr) IV Continuous <Continuous>  influenza   Vaccine 0.5 milliLiter(s) IntraMuscular once  lactobacillus acidophilus 1 Tablet(s) Oral every 8 hours  vancomycin    Solution 500 milliGRAM(s) Oral every 6 hours    MEDICATIONS  (PRN):  acetaminophen   Tablet .. 650 milliGRAM(s) Oral every 6 hours PRN Temp greater or equal to 38C (100.4F)  acetaminophen  IVPB .. 1000 milliGRAM(s) IV Intermittent once PRN Mild Pain (1 - 3)  morphine  - Injectable 1 milliGRAM(s) IV Push every 6 hours PRN Moderate Pain (4 - 6)  ondansetron Injectable 4 milliGRAM(s) IV Push every 8 hours PRN Nausea and/or Vomiting        __________________________________________________  REVIEW OF SYSTEMS:    CONSTITUTIONAL: No fever,   EYES: no acute visual disturbances  NECK: No pain or stiffness  RESPIRATORY: No cough; No shortness of breath  CARDIOVASCULAR: No chest pain, no palpitations  GASTROINTESTINAL: No pain. No nausea or vomiting; No diarrhea   NEUROLOGICAL: No headache or numbness, no tremors  MUSCULOSKELETAL: No joint pain, no muscle pain      Vital Signs Last 24 Hrs  T(C): 36.6 (10 Oct 2018 05:05), Max: 36.8 (09 Oct 2018 14:03)  T(F): 97.8 (10 Oct 2018 05:05), Max: 98.2 (09 Oct 2018 14:03)  HR: 86 (10 Oct 2018 05:05) (81 - 86)  BP: 121/86 (10 Oct 2018 05:05) (108/73 - 126/67)  RR: 17 (10 Oct 2018 05:05) (16 - 17)  SpO2: 98% (10 Oct 2018 05:05) (97% - 100%)    ________________________________________________  PHYSICAL EXAM:  GENERAL: NAD  HEENT: Normocephalic;  conjunctivae and sclerae clear; moist mucous membranes;   NECK : supple  CHEST/LUNG: Clear to auscultation bilaterally with good air entry   HEART: S1 S2  regular; no murmurs, gallops or rubs  ABDOMEN: Soft, Nontender, Nondistended; Bowel sounds present  EXTREMITIES: no cyanosis; no edema; no calf tenderness  NERVOUS SYSTEM:  Awake and alert; Oriented  to place, person and time ; no new deficits    _________________________________________________  LABS:                   RADIOLOGY & ADDITIONAL TESTS:    Imaging Personally Reviewed:  YES  Consultant(s) Notes Reviewed:   YES    Care Discussed with Consultants :     Plan of care was discussed with patient and /or primary care giver; all questions and concerns were addressed and care was aligned with patient's wishes. PGY 1 Note discussed with supervising resident and primary attending    Patient is a 78y old  Female who presents with a chief complaint of Diarrhea (08 Oct 2018 19:10)    INTERVAL HPI/OVERNIGHT EVENTS: No acute events reported overnight.  Today pt presents in no acute distress.  Pt found resting comfortably in bed.  No episodes of diarrhea overnight.  No new complaints today.  Pt tolerating diet well    MEDICATIONS  (STANDING):  cholecalciferol 1000 Unit(s) Oral daily  dextrose 5% + sodium chloride 0.9%. 1000 milliLiter(s) (90 mL/Hr) IV Continuous <Continuous>  influenza   Vaccine 0.5 milliLiter(s) IntraMuscular once  lactobacillus acidophilus 1 Tablet(s) Oral every 8 hours  vancomycin    Solution 500 milliGRAM(s) Oral every 6 hours    MEDICATIONS  (PRN):  acetaminophen   Tablet .. 650 milliGRAM(s) Oral every 6 hours PRN Temp greater or equal to 38C (100.4F)  acetaminophen  IVPB .. 1000 milliGRAM(s) IV Intermittent once PRN Mild Pain (1 - 3)  morphine  - Injectable 1 milliGRAM(s) IV Push every 6 hours PRN Moderate Pain (4 - 6)  ondansetron Injectable 4 milliGRAM(s) IV Push every 8 hours PRN Nausea and/or Vomiting        __________________________________________________  REVIEW OF SYSTEMS:    CONSTITUTIONAL: No fever,   EYES: no acute visual disturbances  NECK: No pain or stiffness  RESPIRATORY: No cough; No shortness of breath  CARDIOVASCULAR: No chest pain, no palpitations  GASTROINTESTINAL: No pain. No nausea or vomiting; No diarrhea   NEUROLOGICAL: No headache or numbness, no tremors  MUSCULOSKELETAL: No joint pain, no muscle pain      Vital Signs Last 24 Hrs  T(C): 36.6 (10 Oct 2018 05:05), Max: 36.8 (09 Oct 2018 14:03)  T(F): 97.8 (10 Oct 2018 05:05), Max: 98.2 (09 Oct 2018 14:03)  HR: 86 (10 Oct 2018 05:05) (81 - 86)  BP: 121/86 (10 Oct 2018 05:05) (108/73 - 126/67)  RR: 17 (10 Oct 2018 05:05) (16 - 17)  SpO2: 98% (10 Oct 2018 05:05) (97% - 100%)    ________________________________________________  PHYSICAL EXAM:  GENERAL: NAD  HEENT: Normocephalic;  conjunctivae and sclerae clear; moist mucous membranes;   NECK : supple  CHEST/LUNG: Clear to auscultation bilaterally with good air entry   HEART: S1 S2  regular; no murmurs, gallops or rubs  ABDOMEN: Soft, Nontender, Nondistended; Bowel sounds present  EXTREMITIES: no cyanosis; no edema; no calf tenderness  NERVOUS SYSTEM:  Awake and alert; Oriented  to place, person and time ; no new deficits    _________________________________________________  LABS:                                   10.1   5.4   )-----------( 395      ( 10 Oct 2018 07:37 )             32.3     10-10    137  |  104  |  <1<L>  ----------------------------<  93  4.1   |  27  |  0.44<L>    Ca    8.8      10 Oct 2018 07:37  Phos  4.0     10-10  Mg     2.0     10-10            RADIOLOGY & ADDITIONAL TESTS:    Imaging Personally Reviewed:  YES  Consultant(s) Notes Reviewed:   YES    Care Discussed with Consultants :     Plan of care was discussed with patient and /or primary care giver; all questions and concerns were addressed and care was aligned with patient's wishes.

## 2018-10-10 NOTE — PROGRESS NOTE ADULT - ASSESSMENT
Patient is a 78y old  Female who presents with a chief complaint of Diarrhea which  is watery, foul smelling associated with Abdominal pain, fever, chills, nausea and NBNB vomiting. Patient recently travelled to [place], admitted for 10 days in  [facility] for bloody Diarrhea, underwent EGD, Colonoscopy and capsule Endoscopy, found to have ulcers in small intestine. Patient was discharged on oral flagyl, she took for 10 days and diarrhea resolved. [place] about [place]  ago and having diarrhea again. In the ER, she found to have low grade fever, tachycardia, Leukocytosis and diffuse colitis on CT abd/pelvis. The stool for C.difficlie toxin PCR is positive. She has started on oral Vancomycin and ID flagyl. The ID consult  requested to assist with further evaluation and antibiotic  management.       # Diffuse colitis  # C.difficile colitis  # AFB positive ib stool    would recommend:    1. Follow up final AFB gene probe  2. Continue oral vancomycin X total of 14 days  3. Contact Isolation until diarrhea resolved   4. Avoid systemic antibiotics if possible    d/w Dr. Santos, patient, Core LAb, House and Nursing staff    will follow the patient with you

## 2018-10-10 NOTE — PROGRESS NOTE ADULT - PROBLEM SELECTOR PLAN 1
-Diarrhea improving- 1 episode overnight   -CT abd showed Diffuse pancolonic wall thickening from the cecum to the rectum   consistent with colitis which may be infectious or inflammatory. Hyperemia of the appendix is likely reactive.  -C.diff positive  -c/w PO vancomycin 500 mg TID per ID recs  -continue with IV fluids  -isolation precaution  -ID consult Dr Altamirano  -GI: Dr. Andrade -Diarrhea improving- 1 episode overnight   -CT abd showed Diffuse pancolonic wall thickening from the cecum to the rectum   consistent with colitis which may be infectious or inflammatory. Hyperemia of the appendix is likely reactive.  -C.diff positive  -c/w PO vancomycin 500 mg TID per ID recs  -continue with IV fluids  -Isolation precaution  -ID consult Dr Altamirano  -GI: Dr. Andrade

## 2018-10-10 NOTE — PROGRESS NOTE ADULT - SUBJECTIVE AND OBJECTIVE BOX
Patient denies chest pain or shortness of breath.   Review of systems otherwise (-)  	  acetaminophen   Tablet .. 650 milliGRAM(s) Oral every 6 hours PRN  acetaminophen  IVPB .. 1000 milliGRAM(s) IV Intermittent once PRN  cholecalciferol 1000 Unit(s) Oral daily  dextrose 5% + sodium chloride 0.9%. 1000 milliLiter(s) IV Continuous <Continuous>  influenza   Vaccine 0.5 milliLiter(s) IntraMuscular once  lactobacillus acidophilus 1 Tablet(s) Oral every 8 hours  morphine  - Injectable 1 milliGRAM(s) IV Push every 6 hours PRN  ondansetron Injectable 4 milliGRAM(s) IV Push every 8 hours PRN  vancomycin    Solution 500 milliGRAM(s) Oral every 6 hours                            10.1   5.4   )-----------( 395      ( 10 Oct 2018 07:37 )             32.3       Hemoglobin: 10.1 g/dL (10-10 @ 07:37)  Hemoglobin: 9.3 g/dL (10-09 @ 10:41)  Hemoglobin: 9.5 g/dL (10-08 @ 12:12)  Hemoglobin: 9.1 g/dL (10-07 @ 07:20)  Hemoglobin: 10.8 g/dL (10-06 @ 20:18)      10-10    137  |  104  |  <1<L>  ----------------------------<  93  4.1   |  27  |  0.44<L>    Ca    8.8      10 Oct 2018 07:37  Phos  4.0     10-10  Mg     2.0     10-10      Creatinine Trend: 0.44<--, 0.43<--, 0.50<--, 0.46<--, 0.46<--, 0.59<--    COAGS:           T(C): 36.6 (10-10-18 @ 05:05), Max: 36.8 (10-09-18 @ 14:03)  HR: 86 (10-10-18 @ 05:05) (81 - 86)  BP: 121/86 (10-10-18 @ 05:05) (108/73 - 126/67)  RR: 17 (10-10-18 @ 05:05) (16 - 17)  SpO2: 98% (10-10-18 @ 05:05) (97% - 100%)  Wt(kg): --    I&O's Summary    Gen: Appears well in NAD  HEENT:  (-)icterus (-)pallor  CV: N S1 S2 1/6 NANCI (+)2 Pulses B/l  Resp:  Clear to ausculatation B/L, normal effort  GI: (+) BS Soft, NT, ND  Lymph:  (-)Edema, (-)obvious lymphadenopathy  Skin: Warm to touch, Normal turgor  Psych: Appropriate mood and affect        ASSESSMENT/PLAN: 	78y  Female rom home with PMHx of  diverticulosis,  PUD,  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 As per patient she is having multiple episodes of watery diarrhea  and hypotension    - Likely due tyo acute volume loss  - f/u echo  - If ech ownl  - No need for further inpatient cardiac work up.  - Cont Abx per primary team    Alen Kothari MD, FACC

## 2018-10-10 NOTE — PROGRESS NOTE ADULT - SUBJECTIVE AND OBJECTIVE BOX
Patient is seen and examined at the bed side, is afebrile. The diarrhea slowed down. But the stool is positive for AFB, work up not finalized yet, spoken with [facility].      REVIEW OF SYSTEMS: All other review systems are negative      ALLERGIES: NKDA        Vital Signs Last 24 Hrs  T(C): 37.2 (10 Oct 2018 14:35), Max: 37.2 (10 Oct 2018 14:35)  T(F): 98.9 (10 Oct 2018 14:35), Max: 98.9 (10 Oct 2018 14:35)  HR: 99 (10 Oct 2018 14:35) (81 - 99)  BP: 106/53 (10 Oct 2018 14:35) (106/53 - 121/86)  BP(mean): --  RR: 18 (10 Oct 2018 14:35) (16 - 18)  SpO2: 98% (10 Oct 2018 14:35) (98% - 100%)      PHYSICAL EXAM:  GENERAL: Not in distress  CHEST/LUNG: Air entry bilaterally   HEART: s1 and s2 present  ABDOMEN: Nontender, and Nondistended  EXTREMITIES:  No pedal edema  CNS: AAOX3        LABS:                        10.1   5.4   )-----------( 395      ( 10 Oct 2018 07:37 )             32.3                                     9.5    8.8   )-----------( 315      ( 08 Oct 2018 12:12 )             30.4                           9.1    19.1  )-----------( 294      ( 07 Oct 2018 07:20 )             28.4         10-10    137  |  104  |  <1<L>  ----------------------------<  93  4.1   |  27  |  0.44<L>    Ca    8.8      10 Oct 2018 07:37  Phos  4.0     10-10  Mg     2.0     10-10      10-09    140  |  106  |  <1<L>  ----------------------------<  115<H>  3.7   |  25  |  0.43<L>    Ca    8.5      09 Oct 2018 10:41  Phos  2.3     10-09  Mg     1.9     10-09        TPro  6.1  /  Alb  2.6<L>  /  TBili  0.4  /  DBili  x   /  AST  11  /  ALT  13  /  AlkPhos  45  10-07      TPro  6.1  /  Alb  2.6<L>  /  TBili  0.4  /  DBili  x   /  AST  11  /  ALT  13  /  AlkPhos  45  10-07      MEDICATIONS  (STANDING):  cholecalciferol 1000 Unit(s) Oral daily  dextrose 5% + sodium chloride 0.9%. 1000 milliLiter(s) (90 mL/Hr) IV Continuous <Continuous>  influenza   Vaccine 0.5 milliLiter(s) IntraMuscular once  lactobacillus acidophilus 1 Tablet(s) Oral every 8 hours  vancomycin    Solution 500 milliGRAM(s) Oral every 6 hours    MEDICATIONS  (PRN):  acetaminophen   Tablet .. 650 milliGRAM(s) Oral every 6 hours PRN Temp greater or equal to 38C (100.4F)  acetaminophen  IVPB .. 1000 milliGRAM(s) IV Intermittent once PRN Mild Pain (1 - 3)  morphine  - Injectable 1 milliGRAM(s) IV Push every 6 hours PRN Moderate Pain (4 - 6)  ondansetron Injectable 4 milliGRAM(s) IV Push every 8 hours PRN Nausea and/or Vomiting          RADIOLOGY & ADDITIONAL TESTS:    10/6/18: CT Abdomen and Pelvis w/ IV Cont (10.06.18 @ 23:53) Pelvic nodes versus cystic neoplasms. Diffuse colitis,  Thickening of the colon, primarily involving the descending and descending colons, down to the rectum.            Assessment and Plan:   · Assessment		  Patient is a 78y old  Female who presents with a chief complaint of Diarrhea which  is watery, foul smelling associated with Abdominal pain, fever, chills, nausea and NBNB vomiting. Patient recently travelled to [place], admitted for 10 days in  [facility] for bloody Diarrhea, underwent EGD, Colonoscopy and capsule Endoscopy, found to have ulcers in small intestine. Patient was discharged on oral flagyl, she took for 10 days and diarrhea resolved. [place] about [place]  ago and having diarrhea again. In the ER, she found to have low grade fever, tachycardia, Leukocytosis and diffuse colitis on CT abd/pelvis. The stool for C.difficlie toxin PCR is positive. She has started on oral Vancomycin and ID flagyl. The ID consult  requested to assist with further evaluation and antibiotic  management.       # Diffuse colitis  # C.difficile colitis  # AFB positive ib stool    would recommend:    1. Follow up final AFB gene probe  2. Continue oral vancomycin X total of 14 days  3. Monitor electrolytes and supplement as needed in the setting of diarrhea  4. Contact Isolation until diarrhea resolved   5. Avoid systemic antibiotics if possible    d/w patient, [facility], House and Nursing staff    will follow the patient with you Patient is seen and examined at the bed side, is afebrile. The diarrhea resolved.   I have spoken with [facility], The AFB in stool work up not finalized yet.       REVIEW OF SYSTEMS: All other review systems are negative        ALLERGIES: NKDA        Vital Signs Last 24 Hrs  T(C): 37.2 (10 Oct 2018 14:35), Max: 37.2 (10 Oct 2018 14:35)  T(F): 98.9 (10 Oct 2018 14:35), Max: 98.9 (10 Oct 2018 14:35)  HR: 99 (10 Oct 2018 14:35) (81 - 99)  BP: 106/53 (10 Oct 2018 14:35) (106/53 - 121/86)  BP(mean): --  RR: 18 (10 Oct 2018 14:35) (16 - 18)  SpO2: 98% (10 Oct 2018 14:35) (98% - 100%)      PHYSICAL EXAM:  GENERAL: Not in distress  CHEST/LUNG: Air entry bilaterally   HEART: s1 and s2 present  ABDOMEN: Nontender, and Nondistended  EXTREMITIES:  No pedal edema  CNS: AAOX3        LABS:                        10.1   5.4   )-----------( 395      ( 10 Oct 2018 07:37 )             32.3                                     9.5    8.8   )-----------( 315      ( 08 Oct 2018 12:12 )             30.4                           9.1    19.1  )-----------( 294      ( 07 Oct 2018 07:20 )             28.4         10-10    137  |  104  |  <1<L>  ----------------------------<  93  4.1   |  27  |  0.44<L>    Ca    8.8      10 Oct 2018 07:37  Phos  4.0     10-10  Mg     2.0     10-10      10-09    140  |  106  |  <1<L>  ----------------------------<  115<H>  3.7   |  25  |  0.43<L>    Ca    8.5      09 Oct 2018 10:41  Phos  2.3     10-09  Mg     1.9     10-09        TPro  6.1  /  Alb  2.6<L>  /  TBili  0.4  /  DBili  x   /  AST  11  /  ALT  13  /  AlkPhos  45  10-07      TPro  6.1  /  Alb  2.6<L>  /  TBili  0.4  /  DBili  x   /  AST  11  /  ALT  13  /  AlkPhos  45  10-07      MEDICATIONS  (STANDING):  cholecalciferol 1000 Unit(s) Oral daily  dextrose 5% + sodium chloride 0.9%. 1000 milliLiter(s) (90 mL/Hr) IV Continuous <Continuous>  influenza   Vaccine 0.5 milliLiter(s) IntraMuscular once  lactobacillus acidophilus 1 Tablet(s) Oral every 8 hours  vancomycin    Solution 500 milliGRAM(s) Oral every 6 hours    MEDICATIONS  (PRN):  acetaminophen   Tablet .. 650 milliGRAM(s) Oral every 6 hours PRN Temp greater or equal to 38C (100.4F)  acetaminophen  IVPB .. 1000 milliGRAM(s) IV Intermittent once PRN Mild Pain (1 - 3)  morphine  - Injectable 1 milliGRAM(s) IV Push every 6 hours PRN Moderate Pain (4 - 6)  ondansetron Injectable 4 milliGRAM(s) IV Push every 8 hours PRN Nausea and/or Vomiting          RADIOLOGY & ADDITIONAL TESTS:    10/6/18: CT Abdomen and Pelvis w/ IV Cont (10.06.18 @ 23:53) Pelvic nodes versus cystic neoplasms. Diffuse colitis,  Thickening of the colon, primarily involving the descending and descending colons, down to the rectum.

## 2018-10-11 LAB
ANION GAP SERPL CALC-SCNC: 7 MMOL/L — SIGNIFICANT CHANGE UP (ref 5–17)
BUN SERPL-MCNC: 3 MG/DL — LOW (ref 7–18)
CALCIUM SERPL-MCNC: 9.4 MG/DL — SIGNIFICANT CHANGE UP (ref 8.4–10.5)
CHLORIDE SERPL-SCNC: 104 MMOL/L — SIGNIFICANT CHANGE UP (ref 96–108)
CO2 SERPL-SCNC: 26 MMOL/L — SIGNIFICANT CHANGE UP (ref 22–31)
CREAT SERPL-MCNC: 0.6 MG/DL — SIGNIFICANT CHANGE UP (ref 0.5–1.3)
CULTURE RESULTS: SIGNIFICANT CHANGE UP
CULTURE RESULTS: SIGNIFICANT CHANGE UP
GLUCOSE SERPL-MCNC: 94 MG/DL — SIGNIFICANT CHANGE UP (ref 70–99)
HCT VFR BLD CALC: 33.3 % — LOW (ref 34.5–45)
HGB BLD-MCNC: 10.4 G/DL — LOW (ref 11.5–15.5)
MAGNESIUM SERPL-MCNC: 2.2 MG/DL — SIGNIFICANT CHANGE UP (ref 1.6–2.6)
MCHC RBC-ENTMCNC: 28.7 PG — SIGNIFICANT CHANGE UP (ref 27–34)
MCHC RBC-ENTMCNC: 31.1 GM/DL — LOW (ref 32–36)
MCV RBC AUTO: 92.2 FL — SIGNIFICANT CHANGE UP (ref 80–100)
PHOSPHATE SERPL-MCNC: 4.3 MG/DL — SIGNIFICANT CHANGE UP (ref 2.5–4.5)
PLATELET # BLD AUTO: 471 K/UL — HIGH (ref 150–400)
POTASSIUM SERPL-MCNC: 4.2 MMOL/L — SIGNIFICANT CHANGE UP (ref 3.5–5.3)
POTASSIUM SERPL-SCNC: 4.2 MMOL/L — SIGNIFICANT CHANGE UP (ref 3.5–5.3)
RBC # BLD: 3.61 M/UL — LOW (ref 3.8–5.2)
RBC # FLD: 15.7 % — HIGH (ref 10.3–14.5)
SODIUM SERPL-SCNC: 137 MMOL/L — SIGNIFICANT CHANGE UP (ref 135–145)
SPECIMEN SOURCE: SIGNIFICANT CHANGE UP
SPECIMEN SOURCE: SIGNIFICANT CHANGE UP
WBC # BLD: 6.4 K/UL — SIGNIFICANT CHANGE UP (ref 3.8–10.5)
WBC # FLD AUTO: 6.4 K/UL — SIGNIFICANT CHANGE UP (ref 3.8–10.5)

## 2018-10-11 RX ADMIN — Medication 1000 UNIT(S): at 11:42

## 2018-10-11 RX ADMIN — Medication 500 MILLIGRAM(S): at 23:55

## 2018-10-11 RX ADMIN — Medication 1 TABLET(S): at 14:11

## 2018-10-11 RX ADMIN — Medication 1 TABLET(S): at 06:26

## 2018-10-11 RX ADMIN — Medication 1 TABLET(S): at 21:05

## 2018-10-11 RX ADMIN — Medication 500 MILLIGRAM(S): at 11:42

## 2018-10-11 RX ADMIN — Medication 500 MILLIGRAM(S): at 06:26

## 2018-10-11 RX ADMIN — Medication 500 MILLIGRAM(S): at 00:26

## 2018-10-11 RX ADMIN — Medication 500 MILLIGRAM(S): at 17:22

## 2018-10-11 NOTE — PROGRESS NOTE ADULT - SUBJECTIVE AND OBJECTIVE BOX
PGY 1 Note discussed with supervising resident and primary attending    Patient is a 78y old  Female who presents with a chief complaint of Diarrhea (08 Oct 2018 19:10)    INTERVAL HPI/OVERNIGHT EVENTS: No acute events reported overnight.  Today pt presents in no acute distress.  Pt found resting comfortably in bed.  No episodes of diarrhea overnight.  No new complaints today.  Pt tolerating diet well    MEDICATIONS  (STANDING):  cholecalciferol 1000 Unit(s) Oral daily  dextrose 5% + sodium chloride 0.9%. 1000 milliLiter(s) (90 mL/Hr) IV Continuous <Continuous>  influenza   Vaccine 0.5 milliLiter(s) IntraMuscular once  lactobacillus acidophilus 1 Tablet(s) Oral every 8 hours  vancomycin    Solution 500 milliGRAM(s) Oral every 6 hours    MEDICATIONS  (PRN):  acetaminophen   Tablet .. 650 milliGRAM(s) Oral every 6 hours PRN Temp greater or equal to 38C (100.4F)  acetaminophen  IVPB .. 1000 milliGRAM(s) IV Intermittent once PRN Mild Pain (1 - 3)  morphine  - Injectable 1 milliGRAM(s) IV Push every 6 hours PRN Moderate Pain (4 - 6)  ondansetron Injectable 4 milliGRAM(s) IV Push every 8 hours PRN Nausea and/or Vomiting        __________________________________________________  REVIEW OF SYSTEMS:    CONSTITUTIONAL: No fever,   EYES: no acute visual disturbances  NECK: No pain or stiffness  RESPIRATORY: No cough; No shortness of breath  CARDIOVASCULAR: No chest pain, no palpitations  GASTROINTESTINAL: No pain. No nausea or vomiting; No diarrhea   NEUROLOGICAL: No headache or numbness, no tremors  MUSCULOSKELETAL: No joint pain, no muscle pain      Vital Signs Last 24 Hrs  T(C): 36.8 (11 Oct 2018 05:06), Max: 37.2 (10 Oct 2018 14:35)  T(F): 98.3 (11 Oct 2018 05:06), Max: 98.9 (10 Oct 2018 14:35)  HR: 79 (11 Oct 2018 05:06) (79 - 99)  BP: 122/83 (11 Oct 2018 05:06) (106/53 - 122/83)  RR: 17 (11 Oct 2018 05:06) (17 - 18)  SpO2: 97% (11 Oct 2018 05:06) (96% - 98%)    ________________________________________________  PHYSICAL EXAM:  GENERAL: NAD  HEENT: Normocephalic;  conjunctivae and sclerae clear; moist mucous membranes;   NECK : supple  CHEST/LUNG: Clear to auscultation bilaterally with good air entry   HEART: S1 S2  regular; no murmurs, gallops or rubs  ABDOMEN: Soft, Nontender, Nondistended; Bowel sounds present  EXTREMITIES: no cyanosis; no edema; no calf tenderness  NERVOUS SYSTEM:  Awake and alert; Oriented  to place, person and time ; no new deficits    _________________________________________________  LABS:                                              10.4   6.4   )-----------( 471      ( 11 Oct 2018 07:48 )             33.3     10-11    137  |  104  |  3<L>  ----------------------------<  94  4.2   |  26  |  0.60    Ca    9.4      11 Oct 2018 07:48  Phos  4.3     10-11  Mg     2.2     10-11                RADIOLOGY & ADDITIONAL TESTS:    Imaging Personally Reviewed:  YES  Consultant(s) Notes Reviewed:   YES    Care Discussed with Consultants :     Plan of care was discussed with patient and /or primary care giver; all questions and concerns were addressed and care was aligned with patient's wishes.

## 2018-10-11 NOTE — PROGRESS NOTE ADULT - ASSESSMENT
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 As per patient she is having multiple episodes of watery diarrhea ( 10 - 12 episodes in 24 hrs) mixed with Bright red blood. Patient have intermitted episodes of Diarrhea since 1 week went to see her PCP underwent some test but no significant improvement in Diarrhea. Diarrhea is watery, foul smelling associated with Abdominal pain, fever, chills, nausea and NBNB vomiting. Patient recently travelled to [place], admitted for 10 days for bloody Diarrhea, underwent EGD, Colonoscopy and capsule Endoscopy, found to have ulcers in small intestine. Patient was discharged on oral flagyl, she took for 10 days and diarrhea resolved. patient came to back to [place] and having diarrhea again. Patient denies chest pain, sob, dysuria, skin rashes, or dysuria.     on admission patient is low grade fever Tmax 99.8, tachycardiac, , /65, CBC 23K, HB 10.5, . lactate normal   CT showed pancoliitis   Cdiff pos  Mod AFB in stool- awaiting AFB gene probe

## 2018-10-11 NOTE — PROGRESS NOTE ADULT - PROBLEM SELECTOR PLAN 3
-Resolved  -on admission ow grade fever Tmax 99.8, tachycardia hR 112   -cbc showed leucocytosis 23k   -CT abd showed Diffuse pancolonic wall thickening from the cecum to the rectum   consistent with colitis which may be infectious or inflammatory. Hyperemia of the appendix is likely reactive.  -C.diff positive  -failed outpatient flagyl therapy   -c/w flagyl 500mg Q8 hrs and PO vancomycin 500 mg TID   -continue with IV fluids  -Isolation precaution  -ID: consult Dr Altamirano

## 2018-10-11 NOTE — PROGRESS NOTE ADULT - SUBJECTIVE AND OBJECTIVE BOX
Patient denies chest pain or shortness of breath.   Review of systems otherwise (-)    acetaminophen   Tablet .. 650 milliGRAM(s) Oral every 6 hours PRN  acetaminophen  IVPB .. 1000 milliGRAM(s) IV Intermittent once PRN  cholecalciferol 1000 Unit(s) Oral daily  dextrose 5% + sodium chloride 0.9%. 1000 milliLiter(s) IV Continuous <Continuous>  influenza   Vaccine 0.5 milliLiter(s) IntraMuscular once  lactobacillus acidophilus 1 Tablet(s) Oral every 8 hours  morphine  - Injectable 1 milliGRAM(s) IV Push every 6 hours PRN  ondansetron Injectable 4 milliGRAM(s) IV Push every 8 hours PRN  vancomycin    Solution 500 milliGRAM(s) Oral every 6 hours                            10.1   5.4   )-----------( 395      ( 10 Oct 2018 07:37 )             32.3       Hemoglobin: 10.1 g/dL (10-10 @ 07:37)  Hemoglobin: 9.3 g/dL (10-09 @ 10:41)  Hemoglobin: 9.5 g/dL (10-08 @ 12:12)  Hemoglobin: 9.1 g/dL (10-07 @ 07:20)  Hemoglobin: 10.8 g/dL (10-06 @ 20:18)      10-10    137  |  104  |  <1<L>  ----------------------------<  93  4.1   |  27  |  0.44<L>    Ca    8.8      10 Oct 2018 07:37  Phos  4.0     10-10  Mg     2.0     10-10      Creatinine Trend: 0.44<--, 0.43<--, 0.50<--, 0.46<--, 0.46<--, 0.59<--    COAGS:           T(C): 36.8 (10-11-18 @ 05:06), Max: 37.2 (10-10-18 @ 14:35)  HR: 79 (10-11-18 @ 05:06) (79 - 99)  BP: 122/83 (10-11-18 @ 05:06) (106/53 - 122/83)  RR: 17 (10-11-18 @ 05:06) (17 - 18)  SpO2: 97% (10-11-18 @ 05:06) (96% - 98%)  Wt(kg): --    I&O's Summary  	  Appears well in NAD  HEENT:  (-)icterus (-)pallor  CV: N S1 S2 1/6 NANCI (+)2 Pulses B/l  Resp:  Clear to ausculatation B/L, normal effort  GI: (+) BS Soft, NT, ND  Lymph:  (-)Edema, (-)obvious lymphadenopathy  Skin: Warm to touch, Normal turgor  Psych: Appropriate mood and affect        ASSESSMENT/PLAN: 	78y  Female rom home with PMHx of  diverticulosis,  PUD,  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 As per patient she is having multiple episodes of watery diarrhea  and hypotension    Cont PO vanco    GI / DVT prophylaxis.   keep K>4, mag >2.0   IV hydration   echo pending - No need for further inpatient cardiac work up.  D/W Dr Kothari Patient denies chest pain or shortness of breath.   Review of systems otherwise (-)    acetaminophen   Tablet .. 650 milliGRAM(s) Oral every 6 hours PRN  acetaminophen  IVPB .. 1000 milliGRAM(s) IV Intermittent once PRN  cholecalciferol 1000 Unit(s) Oral daily  dextrose 5% + sodium chloride 0.9%. 1000 milliLiter(s) IV Continuous <Continuous>  influenza   Vaccine 0.5 milliLiter(s) IntraMuscular once  lactobacillus acidophilus 1 Tablet(s) Oral every 8 hours  morphine  - Injectable 1 milliGRAM(s) IV Push every 6 hours PRN  ondansetron Injectable 4 milliGRAM(s) IV Push every 8 hours PRN  vancomycin    Solution 500 milliGRAM(s) Oral every 6 hours                            10.1   5.4   )-----------( 395      ( 10 Oct 2018 07:37 )             32.3       Hemoglobin: 10.1 g/dL (10-10 @ 07:37)  Hemoglobin: 9.3 g/dL (10-09 @ 10:41)  Hemoglobin: 9.5 g/dL (10-08 @ 12:12)  Hemoglobin: 9.1 g/dL (10-07 @ 07:20)  Hemoglobin: 10.8 g/dL (10-06 @ 20:18)      10-10    137  |  104  |  <1<L>  ----------------------------<  93  4.1   |  27  |  0.44<L>    Ca    8.8      10 Oct 2018 07:37  Phos  4.0     10-10  Mg     2.0     10-10      Creatinine Trend: 0.44<--, 0.43<--, 0.50<--, 0.46<--, 0.46<--, 0.59<--    COAGS:           T(C): 36.8 (10-11-18 @ 05:06), Max: 37.2 (10-10-18 @ 14:35)  HR: 79 (10-11-18 @ 05:06) (79 - 99)  BP: 122/83 (10-11-18 @ 05:06) (106/53 - 122/83)  RR: 17 (10-11-18 @ 05:06) (17 - 18)  SpO2: 97% (10-11-18 @ 05:06) (96% - 98%)  Wt(kg): --    I&O's Summary  	  Appears well in NAD  HEENT:  (-)icterus (-)pallor  CV: N S1 S2 1/6 NANCI (+)2 Pulses B/l  Resp:  Clear to ausculatation B/L, normal effort  GI: (+) BS Soft, NT, ND  Lymph:  (-)Edema, (-)obvious lymphadenopathy  Skin: Warm to touch, Normal turgor  Psych: Appropriate mood and affect        ASSESSMENT/PLAN: 	78y  Female rom home with PMHx of  diverticulosis,  PUD,  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 As per patient she is having multiple episodes of watery diarrhea  and hypotension    Cont PO vanco    GI / DVT prophylaxis.   keep K>4, mag >2.0   IV hydration   if echo normal- No need for further inpatient cardiac work up.  D/W Dr Kothari

## 2018-10-11 NOTE — PROGRESS NOTE ADULT - ASSESSMENT
Patient is a 78y old  Female who presents with a chief complaint of Diarrhea which  is watery, foul smelling associated with Abdominal pain, fever, chills, nausea and NBNB vomiting. Patient recently travelled to [place], admitted for 10 days in  [facility] for bloody Diarrhea, underwent EGD, Colonoscopy and capsule Endoscopy, found to have ulcers in small intestine. Patient was discharged on oral flagyl, she took for 10 days and diarrhea resolved. patient came [place] back to [place] about 3 [place]  ago and having diarrhea again. In the ER, she found to have low grade fever, tachycardia, Leukocytosis and diffuse colitis on CT abd/pelvis. The stool for C.difficlie toxin PCR is positive. She has started on oral Vancomycin and ID flagyl. The ID consult  requested to assist with further evaluation and antibiotic  management.       # Diffuse colitis  # C.difficile colitis  # AFB positive ib stool    would recommend:    1. Follow up final AFB gene probe  2. Continue oral vancomycin X total of 14 days  3. Contact Isolation until diarrhea resolved   4. Avoid systemic antibiotics if possible    d/w Dr. Santos, patient, [facility], House and Nursing staff    will follow the patient with you Patient is a 78y old  Female who presents with a chief complaint of Diarrhea which  is watery, foul smelling associated with Abdominal pain, fever, chills, nausea and NBNB vomiting. Patient recently travelled to [place], admitted for 10 days in  [facility] for bloody Diarrhea, underwent EGD, Colonoscopy and capsule Endoscopy, found to have ulcers in small intestine. Patient was discharged on oral flagyl, she took for 10 days and diarrhea resolved. [place] came [place] back to [place] about 3 [place]  ago and having diarrhea again. In the ER, she found to have low grade fever, tachycardia, Leukocytosis and diffuse colitis on CT abd/pelvis. The stool for C.difficlie toxin PCR is positive. She has started on oral Vancomycin and ID flagyl. The ID consult  requested to assist with further evaluation and antibiotic  management.       # Diffuse colitis  # C.difficile colitis  # AFB positive ib stool    would recommend:    1. Follow up final AFB gene probe  2. Continue oral vancomycin X total of 14 days  3. Avoid systemic antibiotics if possible  4. OOB to chair     d/w patient, Infection Control Dept, House and Nursing staff    will follow the patient with you

## 2018-10-11 NOTE — PROGRESS NOTE ADULT - SUBJECTIVE AND OBJECTIVE BOX
Patient is seen and examined at the bed side, is afebrile. The diarrhea resolved.   I have spoken with [facility], The AFB in stool work up not finalized yet.       REVIEW OF SYSTEMS: All other review systems are negative        ALLERGIES: NKDA      Vital Signs Last 24 Hrs  T(C): 36.7 (11 Oct 2018 14:28), Max: 37.2 (10 Oct 2018 14:35)  T(F): 98 (11 Oct 2018 14:28), Max: 98.9 (10 Oct 2018 14:35)  HR: 95 (11 Oct 2018 14:28) (79 - 99)  BP: 92/46 (11 Oct 2018 14:28) (92/46 - 122/83)  BP(mean): --  RR: 16 (11 Oct 2018 14:28) (16 - 18)  SpO2: 99% (11 Oct 2018 14:28) (96% - 99%)      PHYSICAL EXAM:  GENERAL: Not in distress  CHEST/LUNG: Air entry bilaterally   HEART: s1 and s2 present  ABDOMEN: Nontender, and Nondistended  EXTREMITIES:  No pedal edema  CNS: AAOX3        LABS:                        10.4   6.4   )-----------( 471      ( 11 Oct 2018 07:48 )             33.3                           10.1   5.4   )-----------( 395      ( 10 Oct 2018 07:37 )             32.3                           9.1    19.1  )-----------( 294      ( 07 Oct 2018 07:20 )             28.4       10-11    137  |  104  |  3<L>  ----------------------------<  94  4.2   |  26  |  0.60    Ca    9.4      11 Oct 2018 07:48  Phos  4.3     10-11  Mg     2.2     10-11      10-10    137  |  104  |  <1<L>  ----------------------------<  93  4.1   |  27  |  0.44<L>    Ca    8.8      10 Oct 2018 07:37  Phos  4.0     10-10  Mg     2.0     10-10    TPro  6.1  /  Alb  2.6<L>  /  TBili  0.4  /  DBili  x   /  AST  11  /  ALT  13  /  AlkPhos  45  10-07      TPro  6.1  /  Alb  2.6<L>  /  TBili  0.4  /  DBili  x   /  AST  11  /  ALT  13  /  AlkPhos  45  10-07        MEDICATIONS  (STANDING):  cholecalciferol 1000 Unit(s) Oral daily  dextrose 5% + sodium chloride 0.9%. 1000 milliLiter(s) (90 mL/Hr) IV Continuous <Continuous>  influenza   Vaccine 0.5 milliLiter(s) IntraMuscular once  lactobacillus acidophilus 1 Tablet(s) Oral every 8 hours  vancomycin    Solution 500 milliGRAM(s) Oral every 6 hours    MEDICATIONS  (PRN):  acetaminophen   Tablet .. 650 milliGRAM(s) Oral every 6 hours PRN Temp greater or equal to 38C (100.4F)  acetaminophen  IVPB .. 1000 milliGRAM(s) IV Intermittent once PRN Mild Pain (1 - 3)  morphine  - Injectable 1 milliGRAM(s) IV Push every 6 hours PRN Moderate Pain (4 - 6)  ondansetron Injectable 4 milliGRAM(s) IV Push every 8 hours PRN Nausea and/or Vomiting            RADIOLOGY & ADDITIONAL TESTS:    10/6/18: CT Abdomen and Pelvis w/ IV Cont (10.06.18 @ 23:53) Pelvic nodes versus cystic neoplasms. Diffuse colitis,  Thickening of the colon, primarily involving the descending and descending colons, down to the rectum. Patient is seen and examined at the bed side, is afebrile.  She is doing better, no new complaints.       REVIEW OF SYSTEMS: All other review systems are negative        ALLERGIES: NKDA      Vital Signs Last 24 Hrs  T(C): 36.7 (11 Oct 2018 14:28), Max: 37.2 (10 Oct 2018 14:35)  T(F): 98 (11 Oct 2018 14:28), Max: 98.9 (10 Oct 2018 14:35)  HR: 95 (11 Oct 2018 14:28) (79 - 99)  BP: 92/46 (11 Oct 2018 14:28) (92/46 - 122/83)  BP(mean): --  RR: 16 (11 Oct 2018 14:28) (16 - 18)  SpO2: 99% (11 Oct 2018 14:28) (96% - 99%)      PHYSICAL EXAM:  GENERAL: Not in distress  CHEST/LUNG: Air entry bilaterally   HEART: s1 and s2 present  ABDOMEN: Nontender, and Nondistended  EXTREMITIES:  No pedal edema  CNS: AAOX3        LABS:                        10.4   6.4   )-----------( 471      ( 11 Oct 2018 07:48 )             33.3                           10.1   5.4   )-----------( 395      ( 10 Oct 2018 07:37 )             32.3                           9.1    19.1  )-----------( 294      ( 07 Oct 2018 07:20 )             28.4       10-11    137  |  104  |  3<L>  ----------------------------<  94  4.2   |  26  |  0.60    Ca    9.4      11 Oct 2018 07:48  Phos  4.3     10-11  Mg     2.2     10-11      10-10    137  |  104  |  <1<L>  ----------------------------<  93  4.1   |  27  |  0.44<L>    Ca    8.8      10 Oct 2018 07:37  Phos  4.0     10-10  Mg     2.0     10-10    TPro  6.1  /  Alb  2.6<L>  /  TBili  0.4  /  DBili  x   /  AST  11  /  ALT  13  /  AlkPhos  45  10-07      TPro  6.1  /  Alb  2.6<L>  /  TBili  0.4  /  DBili  x   /  AST  11  /  ALT  13  /  AlkPhos  45  10-07        MEDICATIONS  (STANDING):  cholecalciferol 1000 Unit(s) Oral daily  dextrose 5% + sodium chloride 0.9%. 1000 milliLiter(s) (90 mL/Hr) IV Continuous <Continuous>  influenza   Vaccine 0.5 milliLiter(s) IntraMuscular once  lactobacillus acidophilus 1 Tablet(s) Oral every 8 hours  vancomycin    Solution 500 milliGRAM(s) Oral every 6 hours    MEDICATIONS  (PRN):  acetaminophen   Tablet .. 650 milliGRAM(s) Oral every 6 hours PRN Temp greater or equal to 38C (100.4F)  acetaminophen  IVPB .. 1000 milliGRAM(s) IV Intermittent once PRN Mild Pain (1 - 3)  morphine  - Injectable 1 milliGRAM(s) IV Push every 6 hours PRN Moderate Pain (4 - 6)  ondansetron Injectable 4 milliGRAM(s) IV Push every 8 hours PRN Nausea and/or Vomiting        RADIOLOGY & ADDITIONAL TESTS:    10/6/18: CT Abdomen and Pelvis w/ IV Cont (10.06.18 @ 23:53) Pelvic nodes versus cystic neoplasms. Diffuse colitis,  Thickening of the colon, primarily involving the descending and descending colons, down to the rectum.        MICROBIOLOGY DATA:    HIV-1/2 Antigen/Antibody Screen by [facility] (10.10.18 @ 18:31)    HIV-1/2 Combo Result: Nonreact: The HIV Ag/Ab Combo test performed screens for HIV-1 p24 antigen,  antibodies to HIV-1 (group M and group O), and antibodies to HIV-2. All  specimens repeatedly reactive will reflex to an HIV 1/2 antibody  confirmation and differentiation test. This assay detects p24 antigen  which may be present prior to the development of HIV antibodies,  therefore a reactive result with a negative HIV 1/2 AB Confirmation  should be followed up with HIV-1 RNA, HIV-2 RNA and repeat testing in 4-8  weeks. A nonreactive result does not preclude previous exposure to or  infection with HIV-1 or HIV-2. [facility] prohibits disclosure of this  result to any unauthorized party.        Culture - Acid Fast - Stool w/Smear . (10.07.18 @ 16:43)    Specimen Source: .Stool Stool    Acid Fast Bacilli Smear:   Moderate Acid fast bacillus seen by fluorochrome stain.      Clostridium difficile Toxin by PCR (10.07.18 @ 11:21)    Clostridium difficile Toxin by PCR: The results of this test should be interpreted with consideration of all  clinical and laboratory findings. This test determines the presence of  the C. difficile tcdB gene at a given time and is not intended to  identify antibiotic associated disease or C. difficile infection without  clinical context. Successful treatment is based on the resolution of  clinical symptoms. This test should not be used as a "test of cure"  because C. difficile DNA will persist after successful treatment. Repeat  testing will not be permitted.    This test is performed on the BD MAX system using Real-Time PCR and  fluorogenic target-specific hybridization.    C Diff by PCR Result: Detected

## 2018-10-12 ENCOUNTER — TRANSCRIPTION ENCOUNTER (OUTPATIENT)
Age: 78
End: 2018-10-12

## 2018-10-12 LAB
BASOPHILS # BLD AUTO: 0.1 K/UL — SIGNIFICANT CHANGE UP (ref 0–0.2)
BASOPHILS NFR BLD AUTO: 2 % — SIGNIFICANT CHANGE UP (ref 0–2)
EOSINOPHIL # BLD AUTO: 0.3 K/UL — SIGNIFICANT CHANGE UP (ref 0–0.5)
EOSINOPHIL NFR BLD AUTO: 4.6 % — SIGNIFICANT CHANGE UP (ref 0–6)
HCT VFR BLD CALC: 31.7 % — LOW (ref 34.5–45)
HGB BLD-MCNC: 9.9 G/DL — LOW (ref 11.5–15.5)
LYMPHOCYTES # BLD AUTO: 2.6 K/UL — SIGNIFICANT CHANGE UP (ref 1–3.3)
LYMPHOCYTES # BLD AUTO: 43.1 % — SIGNIFICANT CHANGE UP (ref 13–44)
MCHC RBC-ENTMCNC: 29 PG — SIGNIFICANT CHANGE UP (ref 27–34)
MCHC RBC-ENTMCNC: 31.3 GM/DL — LOW (ref 32–36)
MCV RBC AUTO: 92.6 FL — SIGNIFICANT CHANGE UP (ref 80–100)
MONOCYTES # BLD AUTO: 0.8 K/UL — SIGNIFICANT CHANGE UP (ref 0–0.9)
MONOCYTES NFR BLD AUTO: 13.1 % — SIGNIFICANT CHANGE UP (ref 2–14)
NEUTROPHILS # BLD AUTO: 2.2 K/UL — SIGNIFICANT CHANGE UP (ref 1.8–7.4)
NEUTROPHILS NFR BLD AUTO: 37.2 % — LOW (ref 43–77)
PLATELET # BLD AUTO: 459 K/UL — HIGH (ref 150–400)
RBC # BLD: 3.42 M/UL — LOW (ref 3.8–5.2)
RBC # FLD: 16.1 % — HIGH (ref 10.3–14.5)
WBC # BLD: 6 K/UL — SIGNIFICANT CHANGE UP (ref 3.8–10.5)
WBC # FLD AUTO: 6 K/UL — SIGNIFICANT CHANGE UP (ref 3.8–10.5)

## 2018-10-12 RX ORDER — CHOLECALCIFEROL (VITAMIN D3) 125 MCG
1000 CAPSULE ORAL
Qty: 0 | Refills: 0 | DISCHARGE
Start: 2018-10-12

## 2018-10-12 RX ORDER — ENOXAPARIN SODIUM 100 MG/ML
40 INJECTION SUBCUTANEOUS DAILY
Qty: 0 | Refills: 0 | Status: DISCONTINUED | OUTPATIENT
Start: 2018-10-12 | End: 2018-10-12

## 2018-10-12 RX ORDER — LACTOBACILLUS ACIDOPHILUS 100MM CELL
1 CAPSULE ORAL
Qty: 30 | Refills: 0 | OUTPATIENT
Start: 2018-10-12 | End: 2018-11-10

## 2018-10-12 RX ORDER — VANCOMYCIN HCL 1 G
125 VIAL (EA) INTRAVENOUS EVERY 6 HOURS
Qty: 0 | Refills: 0 | Status: DISCONTINUED | OUTPATIENT
Start: 2018-10-12 | End: 2018-10-17

## 2018-10-12 RX ADMIN — Medication 500 MILLIGRAM(S): at 11:32

## 2018-10-12 RX ADMIN — Medication 1000 UNIT(S): at 11:32

## 2018-10-12 RX ADMIN — Medication 1 TABLET(S): at 05:20

## 2018-10-12 RX ADMIN — Medication 1 TABLET(S): at 22:23

## 2018-10-12 RX ADMIN — Medication 500 MILLIGRAM(S): at 05:20

## 2018-10-12 RX ADMIN — Medication 500 MILLIGRAM(S): at 17:17

## 2018-10-12 RX ADMIN — Medication 1 TABLET(S): at 13:02

## 2018-10-12 NOTE — DISCHARGE NOTE ADULT - PATIENT PORTAL LINK FT
You can access the [email or web address], offered by [facility], by registering with the following website: [email or web address]

## 2018-10-12 NOTE — DISCHARGE NOTE ADULT - CARE PROVIDER_API CALL
Aj Andrade), Internal Medicine  [id number] [place] [address]  Phone: [phone]  Fax: [phone] Aj Andrade), Internal Medicine  [address] [address] [place] [address]  Phone: [phone]  Fax: [phone]    Kannan Reeves (), Medicine  [address] [address] [place] [address]  Phone: [phone]  Fax: [phone]    Dyan Puckett), Medicine  Dept Director  [address] [place] [address]  Phone: [phone]  Fax: [phone]

## 2018-10-12 NOTE — PROGRESS NOTE ADULT - ASSESSMENT
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 As per patient she is having multiple episodes of watery diarrhea ( 10 - 12 episodes in 24 hrs) mixed with Bright red blood. Patient have intermitted episodes of Diarrhea since 1 week went to see her PCP underwent some test but no significant improvement in Diarrhea. Diarrhea is watery, foul smelling associated with Abdominal pain, fever, chills, nausea and NBNB vomiting. Patient recently travelled to [place], admitted for 10 days for bloody Diarrhea, underwent EGD, Colonoscopy and capsule Endoscopy, found to have ulcers in small intestine. Patient was discharged on oral flagyl, she took for 10 days and diarrhea resolved. patient came to back to [place] and having diarrhea again. Patient denies chest pain, sob, dysuria, skin rashes, or dysuria.     on admission patient is low grade fever Tmax 99.8, tachycardiac, , /65, CBC 23K, HB 10.5, . lactate normal   CT showed pancoliitis   Cdiff pos  Mod AFB in stool- awaiting AFB gene probe

## 2018-10-12 NOTE — PROGRESS NOTE ADULT - PROBLEM SELECTOR PLAN 1
-Diarrhea improving- 1 episode overnight   -CT abd showed Diffuse pancolonic wall thickening from the cecum to the rectum   consistent with colitis which may be infectious or inflammatory. Hyperemia of the appendix is likely reactive.  -C.diff positive  -c/w PO vancomycin 500 mg TID per ID recs- day 5/14  -Pt no longer having diarrhea, and stools are now formed  -will adv diet to regular  -Isolation precaution  -ID consult Dr Altamirano  -GI: Dr. Andrade

## 2018-10-12 NOTE — PROGRESS NOTE ADULT - SUBJECTIVE AND OBJECTIVE BOX
Patient denies chest pain or shortness of breath.   Review of systems otherwise (-)    acetaminophen   Tablet .. 650 milliGRAM(s) Oral every 6 hours PRN  acetaminophen  IVPB .. 1000 milliGRAM(s) IV Intermittent once PRN  cholecalciferol 1000 Unit(s) Oral daily  dextrose 5% + sodium chloride 0.9%. 1000 milliLiter(s) IV Continuous <Continuous>  influenza   Vaccine 0.5 milliLiter(s) IntraMuscular once  lactobacillus acidophilus 1 Tablet(s) Oral every 8 hours  morphine  - Injectable 1 milliGRAM(s) IV Push every 6 hours PRN  ondansetron Injectable 4 milliGRAM(s) IV Push every 8 hours PRN  vancomycin    Solution 500 milliGRAM(s) Oral every 6 hours                            9.9    6.0   )-----------( 459      ( 12 Oct 2018 08:43 )             31.7       Hemoglobin: 9.9 g/dL (10-12 @ 08:43)  Hemoglobin: 10.4 g/dL (10-11 @ 07:48)  Hemoglobin: 10.1 g/dL (10-10 @ 07:37)  Hemoglobin: 9.3 g/dL (10-09 @ 10:41)  Hemoglobin: 9.5 g/dL (10-08 @ 12:12)      10-11    137  |  104  |  3<L>  ----------------------------<  94  4.2   |  26  |  0.60    Ca    9.4      11 Oct 2018 07:48  Phos  4.3     10-11  Mg     2.2     10-11      Creatinine Trend: 0.60<--, 0.44<--, 0.43<--, 0.50<--, 0.46<--, 0.46<--    COAGS:           T(C): 37.1 (10-12-18 @ 14:24), Max: 37.1 (10-12-18 @ 14:24)  HR: 100 (10-12-18 @ 14:24) (66 - 100)  BP: 103/55 (10-12-18 @ 14:24) (103/55 - 116/57)  RR: 16 (10-12-18 @ 14:24) (16 - 16)  SpO2: 97% (10-12-18 @ 14:24) (97% - 100%)  Wt(kg): --    I&O's Summary    11 Oct 2018 07:01  -  12 Oct 2018 07:00  --------------------------------------------------------  IN: 100 mL / OUT: 0 mL / NET: 100 mL      Appears well in NAD  HEENT:  (-)icterus (-)pallor  CV: N S1 S2 1/6 NANCI (+)2 Pulses B/l  Resp:  Clear to ausculatation B/L, normal effort  GI: (+) BS Soft, NT, ND  Lymph:  (-)Edema, (-)obvious lymphadenopathy  Skin: Warm to touch, Normal turgor  Psych: Appropriate mood and affect        ASSESSMENT/PLAN: 	78y  Female rom home with PMHx of  diverticulosis,  PUD,  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 As per patient she is having multiple episodes of watery diarrhea  and hypotension normal LV fx    - Echo without pertinent findings  - No need for further inpatient cardiac work up.    Alen Kothari MD, FACC

## 2018-10-12 NOTE — DISCHARGE NOTE ADULT - PLAN OF CARE
Please continue antibiotic therapy, and follow up with your PCP in 1 week You presented with diarrhea and were found to have C. diff colitis. You were started on Vancomycin therapy, and your symptoms improved.  Your diarrhea resolved during your stay, and you tolerated a regular diet.  Please continue oral vancomycin therapy for >>>days, and follow up with your PCP for further management. 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  This places you at an increased risk of bleed.  Please monitor you Hb, and follow up with your PCP for further management.  If you have a bleeding episode please contact your health care provider or return to ED if not resolving. You're stool was pos for c. diff, and moderate AFB.  Please follow up with your GI specialist as an OP for further management You presented with diarrhea and pos stool cultures for c.diff and mod afb.  Your C.diff resolved on oral Vancomycin.  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  This places you at an increased risk of bleed.  Please monitor you Hb, and follow up with your PCP for further management.  If you have a bleeding episode please contact your health care provider or return to ED if not resolving. You presented with diarrhea and were found to have C. diff colitis. You were started on Vancomycin therapy, and your symptoms improved.  Your diarrhea resolved during your stay, and you tolerated a regular diet.  Please continue oral vancomycin therapy until 10/21/18, and follow up with your PCP for further management. You presented with diarrhea and pos stool cultures for c.diff and mod Afb. Your C.diff resolved on oral Vancomycin.  Your gene probe for AFB was done. You need to follow up you results in around 1-2weeks.

## 2018-10-12 NOTE — PROGRESS NOTE ADULT - SUBJECTIVE AND OBJECTIVE BOX
PGY 1 Note discussed with supervising resident and primary attending    Patient is a 78y old  Female who presents with a chief complaint of Diarrhea (08 Oct 2018 19:10)    INTERVAL HPI/OVERNIGHT EVENTS: No acute events reported overnight.  Today pt presents in no acute distress.  Pt found resting comfortably in bed.  No episodes of diarrhea overnight.  No new complaints today.  Will adv diet today    MEDICATIONS  (STANDING):  cholecalciferol 1000 Unit(s) Oral daily  dextrose 5% + sodium chloride 0.9%. 1000 milliLiter(s) (90 mL/Hr) IV Continuous <Continuous>  enoxaparin Injectable 40 milliGRAM(s) SubCutaneous daily  influenza   Vaccine 0.5 milliLiter(s) IntraMuscular once  lactobacillus acidophilus 1 Tablet(s) Oral every 8 hours  vancomycin    Solution 500 milliGRAM(s) Oral every 6 hours    MEDICATIONS  (PRN):  acetaminophen   Tablet .. 650 milliGRAM(s) Oral every 6 hours PRN Temp greater or equal to 38C (100.4F)  acetaminophen  IVPB .. 1000 milliGRAM(s) IV Intermittent once PRN Mild Pain (1 - 3)  morphine  - Injectable 1 milliGRAM(s) IV Push every 6 hours PRN Moderate Pain (4 - 6)  ondansetron Injectable 4 milliGRAM(s) IV Push every 8 hours PRN Nausea and/or Vomiting        __________________________________________________  REVIEW OF SYSTEMS:    CONSTITUTIONAL: No fever,   EYES: no acute visual disturbances  NECK: No pain or stiffness  RESPIRATORY: No cough; No shortness of breath  CARDIOVASCULAR: No chest pain, no palpitations  GASTROINTESTINAL: No pain. No nausea or vomiting; No diarrhea   NEUROLOGICAL: No headache or numbness, no tremors  MUSCULOSKELETAL: No joint pain, no muscle pain      Vital Signs Last 24 Hrs  T(C): 36.4 (12 Oct 2018 05:05), Max: 36.7 (11 Oct 2018 14:28)  T(F): 97.5 (12 Oct 2018 05:05), Max: 98 (11 Oct 2018 14:28)  HR: 80 (12 Oct 2018 05:05) (66 - 95)  BP: 105/68 (12 Oct 2018 05:05) (92/46 - 116/57)  RR: 16 (12 Oct 2018 05:05) (16 - 16)  SpO2: 99% (12 Oct 2018 05:05) (99% - 100%)  ________________________________________________  PHYSICAL EXAM:  GENERAL: NAD  HEENT: Normocephalic;  conjunctivae and sclerae clear; moist mucous membranes;   NECK : supple  CHEST/LUNG: Clear to auscultation bilaterally with good air entry   HEART: S1 S2  regular; no murmurs, gallops or rubs  ABDOMEN: Soft, Nontender, Nondistended; Bowel sounds present  EXTREMITIES: no cyanosis; no edema; no calf tenderness  NERVOUS SYSTEM:  Awake and alert; Oriented  to place, person and time ; no new deficits    _________________________________________________  LABS:                   RADIOLOGY & ADDITIONAL TESTS:    Imaging Personally Reviewed:  YES  Consultant(s) Notes Reviewed:   YES    Care Discussed with Consultants :     Plan of care was discussed with patient and /or primary care giver; all questions and concerns were addressed and care was aligned with patient's wishes.

## 2018-10-12 NOTE — DISCHARGE NOTE ADULT - CARE PLAN
Principal Discharge DX:	Clostridium difficile colitis  Goal:	Please continue antibiotic therapy, and follow up with your PCP in 1 week  Assessment and plan of treatment:	You presented with diarrhea and were found to have C. diff colitis. You were started on Vancomycin therapy, and your symptoms improved.  Your diarrhea resolved during your stay, and you tolerated a regular diet.  Please continue oral vancomycin therapy for >>>days, and follow up with your PCP for further management.  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  This places you at an increased risk of bleed.  Please monitor you Hb, and follow up with your PCP for further management.  If you have a bleeding episode please contact your health care provider or return to ED if not resolving.  Secondary Diagnosis:	Positive stool culture  Goal:	You're stool was pos for c. diff, and moderate AFB.  Please follow up with your GI specialist as an OP for further management  Assessment and plan of treatment:	You presented with diarrhea and pos stool cultures for c.diff and mod afb.  Your C.diff resolved on oral Vancomycin.  Your gene probe for AFB was sig for>>> Principal Discharge DX:	Clostridium difficile colitis  Goal:	Please continue antibiotic therapy, and follow up with your PCP in 1 week  Assessment and plan of treatment:	You presented with diarrhea and were found to have C. diff colitis. You were started on Vancomycin therapy, and your symptoms improved.  Your diarrhea resolved during your stay, and you tolerated a regular diet.  Please continue oral vancomycin therapy for >>>days, and follow up with your PCP for further management.  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  This places you at an increased risk of bleed.  Please monitor you Hb, and follow up with your PCP for further management.  If you have a bleeding episode please contact your health care provider or return to ED if not resolving.  Secondary Diagnosis:	Positive stool culture  Goal:	You're stool was pos for c. diff, and moderate AFB.  Please follow up with your GI specialist as an OP for further management  Assessment and plan of treatment:	You presented with diarrhea and pos stool cultures for c.diff and mod afb.  Your C.diff resolved on oral Vancomycin.  Your gene probe for AFB was sig for>>> Principal Discharge DX:	Clostridium difficile colitis  Goal:	Please continue antibiotic therapy, and follow up with your PCP in 1 week  Assessment and plan of treatment:	You presented with diarrhea and were found to have C. diff colitis. You were started on Vancomycin therapy, and your symptoms improved.  Your diarrhea resolved during your stay, and you tolerated a regular diet.  Please continue oral vancomycin therapy until 10/21/18, and follow up with your PCP for further management.  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  This places you at an increased risk of bleed.  Please monitor you Hb, and follow up with your PCP for further management.  If you have a bleeding episode please contact your health care provider or return to ED if not resolving.  Secondary Diagnosis:	Positive stool culture  Goal:	You're stool was pos for c. diff, and moderate AFB.  Please follow up with your GI specialist as an OP for further management  Assessment and plan of treatment:	You presented with diarrhea and pos stool cultures for c.diff and mod Afb. Your C.diff resolved on oral Vancomycin.  Your gene probe for AFB was done. You need to follow up you results in around 1-2weeks.

## 2018-10-12 NOTE — DISCHARGE NOTE ADULT - HOSPITAL COURSE
Patient is 78 female from home with PMHx of  diverticulosis,  PUD,  hemophilia C? and PSHx s/p benign bladder tumor resection and s/p cholecystectomy presented to ED with diarrhea. As per patient she is having multiple episodes of watery diarrhea (10 - 12 episodes in 24 hrs) mixed with Bright red blood. Patient have intermitted episodes of Diarrhea since 1 week went to see her PCP underwent some test but no significant improvement in Diarrhea. Diarrhea is watery, foul smelling associated with Abdominal pain, fever, chills, nausea and NBNB vomiting. Patient recently travelled to [place], admitted for 10 days for bloody Diarrhea, underwent EGD, Colonoscopy and capsule Endoscopy, found to have ulcers in small intestine. Patient was discharged on oral flagyl, she took for 10 days and diarrhea resolved. patient [place] back [place] and having diarrhea again. Patient denies chest pain, sob, dysuria, skin rashes, or dysuria.     On admission patient had low grade fever Tmax 99.8, , /65, WBC 23K, HB 10.5, . lactate normal CT showed Pancolitis, suggestive of possible Clostridium Difficile Colitis.    Clostridium Difficile colitis was confirmed and patient was being managed with PO vanc and IV Flagyl, NPO, and IVF and put on contact isolation. Dr. Altamirano (ID), Dr. Andrade (GI), and Dr. Kothari (cardio) were brought onto the case. Stool culture came back positive for AFB and follow up Gene probe was ordered. K+ was repleted. WBC continued to trend down from 23 to 6 and diarrhea resolved with stools now forming. Upon clinical improvement, the decision was made to order her back onto regular diet.    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 and PSHx s/p benign bladder tumor resection and s/p cholecystectomy presented to ED with diarrhea. As per patient she is having multiple episodes of watery diarrhea (10 - 12 episodes in 24 hrs) mixed with Bright red blood. Patient have intermitted episodes of Diarrhea since 1 week went to see her PCP underwent some test but no significant improvement in Diarrhea. Diarrhea is watery, foul smelling associated with Abdominal pain, fever, chills, nausea and NBNB vomiting. Patient recently travelled to [place], admitted for 10 days for bloody Diarrhea, underwent EGD, Colonoscopy and capsule Endoscopy, found to have ulcers in small intestine. Patient was discharged on oral flagyl, she took for 10 days and diarrhea resolved. patient came [place] back [place] diarrhea again. Patient denies chest pain, sob, dysuria, skin rashes, or dysuria.     On admission patient had low grade fever Tmax 99.8, , /65, WBC 23K, HB 10.5, . lactate normal CT showed Pancolitis, suggestive of possible Clostridium Difficile Colitis.    Clostridium Difficile colitis was confirmed and patient was being managed with PO vanc and IV Flagyl, NPO, and IVF and put on contact isolation. Dr. Altamirano (ID), Dr. Andrade (GI), and Dr. Kothari (cardio) were brought onto the case. Stool culture came back positive for AFB and follow up Gene probe was ordered. K+ was repleted. WBC continued to trend down from 23 to 6 and diarrhea resolved with stools now forming. Upon clinical improvement, the decision was made to order her back onto regular diet. Patient tolerated diet must need to complete Vancomycin 125mg PO q daily until 10/21/18.  AFB gene probe pending. Patient must follow up in 1-2 weeks for results.     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

## 2018-10-12 NOTE — PROGRESS NOTE ADULT - ASSESSMENT
Patient is a 78y old  Female who presents with a chief complaint of Diarrhea which  is watery, foul smelling associated with Abdominal pain, fever, chills, nausea and NBNB vomiting. Patient recently travelled to [place], admitted for 10 days in  [facility] for bloody Diarrhea, underwent EGD, Colonoscopy and capsule Endoscopy, found to have ulcers in small intestine. Patient was discharged on oral flagyl, she took for 10 days and diarrhea resolved. [place] about [place]  ago and having diarrhea again. In the ER, she found to have low grade fever, tachycardia, Leukocytosis and diffuse colitis on CT abd/pelvis. The stool for C.difficlie toxin PCR is positive. She has started on oral Vancomycin and ID flagyl. The ID consult  requested to assist with further evaluation and antibiotic  management.       # Diffuse colitis  # C.difficile colitis  # AFB positive ib stool    would recommend:    1. Change oral vancomycin  dose to 125 mg q 6hours and continue until 10/21/18   2. Follow up final AFB gene probe  3. Avoid systemic antibiotics if possible  4. OOB to chair     d/w patient    will follow the patient with you

## 2018-10-12 NOTE — DISCHARGE NOTE ADULT - MEDICATION SUMMARY - MEDICATIONS TO TAKE
I will START or STAY ON the medications listed below when I get home from the hospital:    vancomycin 50 mg/mL oral liquid  -- 3 milliliter(s) by mouth every 6 hours  COMPLETE UNTIL   -- Expires___________________  Refrigerate and shake well.  Expires_______________________    -- Indication: For Clostridium difficile colitis    lactobacillus acidophilus oral capsule  -- 1 tab(s) by mouth once a day   -- Indication: For Clostridium difficile colitis    cholecalciferol oral tablet  -- 1000 unit(s) by mouth once a day  -- Indication: For vitamin d

## 2018-10-12 NOTE — PROGRESS NOTE ADULT - SUBJECTIVE AND OBJECTIVE BOX
Patient is seen and examined at the bed side, is afebrile.  She is doing better, no new complaints. The stool AFB identification has not finalized yet.      REVIEW OF SYSTEMS: All other review systems are negative      ALLERGIES: NKDA      Vital Signs Last 24 Hrs  T(C): 37.1 (12 Oct 2018 14:24), Max: 37.1 (12 Oct 2018 14:24)  T(F): 98.7 (12 Oct 2018 14:24), Max: 98.7 (12 Oct 2018 14:24)  HR: 100 (12 Oct 2018 14:24) (66 - 100)  BP: 103/55 (12 Oct 2018 14:24) (103/55 - 116/57)  BP(mean): --  RR: 16 (12 Oct 2018 14:24) (16 - 16)  SpO2: 97% (12 Oct 2018 14:24) (97% - 100%)      PHYSICAL EXAM:  GENERAL: Not in distress  CHEST/LUNG: Air entry bilaterally   HEART: s1 and s2 present  ABDOMEN: Nontender, and Nondistended  EXTREMITIES:  No pedal edema  CNS: AAOX3        LABS:                        9.9    6.0   )-----------( 459      ( 12 Oct 2018 08:43 )             31.7                         10.1   5.4   )-----------( 395      ( 10 Oct 2018 07:37 )             32.3                           9.1    19.1  )-----------( 294      ( 07 Oct 2018 07:20 )             28.4         10-11    137  |  104  |  3<L>  ----------------------------<  94  4.2   |  26  |  0.60    Ca    9.4      11 Oct 2018 07:48  Phos  4.3     10-11  Mg     2.2     10-11      10-11    137  |  104  |  3<L>  ----------------------------<  94  4.2   |  26  |  0.60    Ca    9.4      11 Oct 2018 07:48  Phos  4.3     10-11  Mg     2.2     10-11      TPro  6.1  /  Alb  2.6<L>  /  TBili  0.4  /  DBili  x   /  AST  11  /  ALT  13  /  AlkPhos  45  10-07      TPro  6.1  /  Alb  2.6<L>  /  TBili  0.4  /  DBili  x   /  AST  11  /  ALT  13  /  AlkPhos  45  10-07        MEDICATIONS  (STANDING):  cholecalciferol 1000 Unit(s) Oral daily  dextrose 5% + sodium chloride 0.9%. 1000 milliLiter(s) (90 mL/Hr) IV Continuous <Continuous>  influenza   Vaccine 0.5 milliLiter(s) IntraMuscular once  lactobacillus acidophilus 1 Tablet(s) Oral every 8 hours  vancomycin    Solution 500 milliGRAM(s) Oral every 6 hours    MEDICATIONS  (PRN):  acetaminophen   Tablet .. 650 milliGRAM(s) Oral every 6 hours PRN Temp greater or equal to 38C (100.4F)  acetaminophen  IVPB .. 1000 milliGRAM(s) IV Intermittent once PRN Mild Pain (1 - 3)  morphine  - Injectable 1 milliGRAM(s) IV Push every 6 hours PRN Moderate Pain (4 - 6)  ondansetron Injectable 4 milliGRAM(s) IV Push every 8 hours PRN Nausea and/or Vomiting      RADIOLOGY & ADDITIONAL TESTS:    10/6/18: CT Abdomen and Pelvis w/ IV Cont (10.06.18 @ 23:53) Pelvic nodes versus cystic neoplasms. Diffuse colitis,  Thickening of the colon, primarily involving the descending and descending colons, down to the rectum.        MICROBIOLOGY DATA:      HIV-1/2 Antigen/Antibody Screen by CMIA (10.10.18 @ 18:31)    HIV-1/2 Combo Result: Nonreact: The HIV Ag/Ab Combo test performed screens for HIV-1 p24 antigen,  antibodies to HIV-1 (group M and group O), and antibodies to HIV-2. All  specimens repeatedly reactive will reflex to an HIV 1/2 antibody  confirmation and differentiation test. This assay detects p24 antigen  which may be present prior to the development of HIV antibodies,  therefore a reactive result with a negative HIV 1/2 AB Confirmation  should be followed up with HIV-1 RNA, HIV-2 RNA and repeat testing in 4-8  weeks. A nonreactive result does not preclude previous exposure to or  infection with HIV-1 or HIV-2. [facility] prohibits disclosure of this  result to any unauthorized party.        Culture - Acid Fast - Stool w/Smear . (10.07.18 @ 16:43)    Specimen Source: .Stool Stool    Acid Fast Bacilli Smear:   Moderate Acid fast bacillus seen by fluorochrome stain.      Clostridium difficile Toxin by PCR (10.07.18 @ 11:21)    Clostridium difficile Toxin by PCR: The results of this test should be interpreted with consideration of all  clinical and laboratory findings. This test determines the presence of  the C. difficile tcdB gene at a given time and is not intended to  identify antibiotic associated disease or C. difficile infection without  clinical context. Successful treatment is based on the resolution of  clinical symptoms. This test should not be used as a "test of cure"  because C. difficile DNA will persist after successful treatment. Repeat  testing will not be permitted.    This test is performed on the BD MAX system using Real-Time PCR and  fluorogenic target-specific hybridization.    C Diff by PCR Result: Detected

## 2018-10-13 LAB
ANION GAP SERPL CALC-SCNC: 5 MMOL/L — SIGNIFICANT CHANGE UP (ref 5–17)
BUN SERPL-MCNC: 11 MG/DL — SIGNIFICANT CHANGE UP (ref 7–18)
CALCIUM SERPL-MCNC: 9.1 MG/DL — SIGNIFICANT CHANGE UP (ref 8.4–10.5)
CHLORIDE SERPL-SCNC: 105 MMOL/L — SIGNIFICANT CHANGE UP (ref 96–108)
CO2 SERPL-SCNC: 28 MMOL/L — SIGNIFICANT CHANGE UP (ref 22–31)
CREAT SERPL-MCNC: 0.63 MG/DL — SIGNIFICANT CHANGE UP (ref 0.5–1.3)
GLUCOSE SERPL-MCNC: 87 MG/DL — SIGNIFICANT CHANGE UP (ref 70–99)
HCT VFR BLD CALC: 32.4 % — LOW (ref 34.5–45)
HGB BLD-MCNC: 10 G/DL — LOW (ref 11.5–15.5)
MCHC RBC-ENTMCNC: 29 PG — SIGNIFICANT CHANGE UP (ref 27–34)
MCHC RBC-ENTMCNC: 30.9 GM/DL — LOW (ref 32–36)
MCV RBC AUTO: 94 FL — SIGNIFICANT CHANGE UP (ref 80–100)
PLATELET # BLD AUTO: 461 K/UL — HIGH (ref 150–400)
POTASSIUM SERPL-MCNC: 4.2 MMOL/L — SIGNIFICANT CHANGE UP (ref 3.5–5.3)
POTASSIUM SERPL-SCNC: 4.2 MMOL/L — SIGNIFICANT CHANGE UP (ref 3.5–5.3)
RBC # BLD: 3.45 M/UL — LOW (ref 3.8–5.2)
RBC # FLD: 16.3 % — HIGH (ref 10.3–14.5)
SODIUM SERPL-SCNC: 138 MMOL/L — SIGNIFICANT CHANGE UP (ref 135–145)
WBC # BLD: 6.8 K/UL — SIGNIFICANT CHANGE UP (ref 3.8–10.5)
WBC # FLD AUTO: 6.8 K/UL — SIGNIFICANT CHANGE UP (ref 3.8–10.5)

## 2018-10-13 RX ADMIN — Medication 125 MILLIGRAM(S): at 05:24

## 2018-10-13 RX ADMIN — Medication 125 MILLIGRAM(S): at 11:31

## 2018-10-13 RX ADMIN — Medication 125 MILLIGRAM(S): at 18:17

## 2018-10-13 RX ADMIN — Medication 125 MILLIGRAM(S): at 23:06

## 2018-10-13 RX ADMIN — Medication 1 TABLET(S): at 21:27

## 2018-10-13 RX ADMIN — Medication 1 TABLET(S): at 14:01

## 2018-10-13 RX ADMIN — Medication 1000 UNIT(S): at 11:31

## 2018-10-13 RX ADMIN — Medication 125 MILLIGRAM(S): at 00:29

## 2018-10-13 RX ADMIN — Medication 1 TABLET(S): at 05:24

## 2018-10-13 NOTE — PROGRESS NOTE ADULT - SUBJECTIVE AND OBJECTIVE BOX
Patient is seen and examined at the bed side, is afebrile.  She is doing better, no new complaints. The stool AFB identification has not finalized yet.      REVIEW OF SYSTEMS: All other review systems are negative      ALLERGIES: NKDA      Vital Signs Last 24 Hrs  T(C): 36.7 (13 Oct 2018 20:31), Max: 37.1 (12 Oct 2018 21:13)  T(F): 98.1 (13 Oct 2018 20:31), Max: 98.7 (12 Oct 2018 21:13)  HR: 86 (13 Oct 2018 20:31) (81 - 96)  BP: 109/60 (13 Oct 2018 20:31) (103/64 - 131/63)  BP(mean): --  RR: 17 (13 Oct 2018 20:31) (16 - 18)  SpO2: 96% (13 Oct 2018 20:31) (94% - 97%)        PHYSICAL EXAM:  GENERAL: Not in distress  CHEST/LUNG: Air entry bilaterally   HEART: s1 and s2 present  ABDOMEN: Nontender, and Nondistended  EXTREMITIES:  No pedal edema  CNS: AAOX3        LABS:                        10.0   6.8   )-----------( 461      ( 13 Oct 2018 06:40 )             32.4                         9.1    19.1  )-----------( 294      ( 07 Oct 2018 07:20 )             28.4         10-13    138  |  105  |  11  ----------------------------<  87  4.2   |  28  |  0.63    Ca    9.1      13 Oct 2018 06:40      10-11    137  |  104  |  3<L>  ----------------------------<  94  4.2   |  26  |  0.60    Ca    9.4      11 Oct 2018 07:48  Phos  4.3     10-11  Mg     2.2     10-11    TPro  6.1  /  Alb  2.6<L>  /  TBili  0.4  /  DBili  x   /  AST  11  /  ALT  13  /  AlkPhos  45  10-07        MEDICATIONS  (STANDING):  cholecalciferol 1000 Unit(s) Oral daily  influenza   Vaccine 0.5 milliLiter(s) IntraMuscular once  lactobacillus acidophilus 1 Tablet(s) Oral every 8 hours  vancomycin    Solution 125 milliGRAM(s) Oral every 6 hours    MEDICATIONS  (PRN):  acetaminophen   Tablet .. 650 milliGRAM(s) Oral every 6 hours PRN Temp greater or equal to 38C (100.4F)  acetaminophen  IVPB .. 1000 milliGRAM(s) IV Intermittent once PRN Mild Pain (1 - 3)  morphine  - Injectable 1 milliGRAM(s) IV Push every 6 hours PRN Moderate Pain (4 - 6)  ondansetron Injectable 4 milliGRAM(s) IV Push every 8 hours PRN Nausea and/or Vomiting        RADIOLOGY & ADDITIONAL TESTS:    10/6/18: CT Abdomen and Pelvis w/ IV Cont (10.06.18 @ 23:53) Pelvic nodes versus cystic neoplasms. Diffuse colitis,  Thickening of the colon, primarily involving the descending and descending colons, down to the rectum.        MICROBIOLOGY DATA:      HIV-1/2 Antigen/Antibody Screen by CMIA (10.10.18 @ 18:31)    HIV-1/2 Combo Result: Nonreact: The HIV Ag/Ab Combo test performed screens for HIV-1 p24 antigen,  antibodies to HIV-1 (group M and group O), and antibodies to HIV-2. All  specimens repeatedly reactive will reflex to an HIV 1/2 antibody  confirmation and differentiation test. This assay detects p24 antigen  which may be present prior to the development of HIV antibodies,  therefore a reactive result with a negative HIV 1/2 AB Confirmation  should be followed up with HIV-1 RNA, HIV-2 RNA and repeat testing in 4-8  weeks. A nonreactive result does not preclude previous exposure to or  infection with HIV-1 or HIV-2. [facility] prohibits disclosure of this  result to any unauthorized party.        Culture - Acid Fast - Stool w/Smear . (10.07.18 @ 16:43)    Specimen Source: .Stool Stool    Acid Fast Bacilli Smear:   Moderate Acid fast bacillus seen by fluorochrome stain.      Clostridium difficile Toxin by PCR (10.07.18 @ 11:21)    Clostridium difficile Toxin by PCR: The results of this test should be interpreted with consideration of all  clinical and laboratory findings. This test determines the presence of  the C. difficile tcdB gene at a given time and is not intended to  identify antibiotic associated disease or C. difficile infection without  clinical context. Successful treatment is based on the resolution of  clinical symptoms. This test should not be used as a "test of cure"  because C. difficile DNA will persist after successful treatment. Repeat  testing will not be permitted.    This test is performed on the BD MAX system using Real-Time PCR and  fluorogenic target-specific hybridization.    C Diff by PCR Result: Detected Patient is seen and examined at the bed side, is afebrile.  She is doing better, no more diarrhea.       REVIEW OF SYSTEMS: All other review systems are negative      ALLERGIES: NKDA      Vital Signs Last 24 Hrs  T(C): 36.7 (13 Oct 2018 20:31), Max: 37.1 (12 Oct 2018 21:13)  T(F): 98.1 (13 Oct 2018 20:31), Max: 98.7 (12 Oct 2018 21:13)  HR: 86 (13 Oct 2018 20:31) (81 - 96)  BP: 109/60 (13 Oct 2018 20:31) (103/64 - 131/63)  BP(mean): --  RR: 17 (13 Oct 2018 20:31) (16 - 18)  SpO2: 96% (13 Oct 2018 20:31) (94% - 97%)        PHYSICAL EXAM:  GENERAL: Not in distress  CHEST/LUNG: Air entry bilaterally   HEART: s1 and s2 present  ABDOMEN: Nontender, and Nondistended  EXTREMITIES:  No pedal edema  CNS: AAOX3        LABS:                        10.0   6.8   )-----------( 461      ( 13 Oct 2018 06:40 )             32.4                         9.1    19.1  )-----------( 294      ( 07 Oct 2018 07:20 )             28.4         10-13    138  |  105  |  11  ----------------------------<  87  4.2   |  28  |  0.63    Ca    9.1      13 Oct 2018 06:40      10-11    137  |  104  |  3<L>  ----------------------------<  94  4.2   |  26  |  0.60    Ca    9.4      11 Oct 2018 07:48  Phos  4.3     10-11  Mg     2.2     10-11    TPro  6.1  /  Alb  2.6<L>  /  TBili  0.4  /  DBili  x   /  AST  11  /  ALT  13  /  AlkPhos  45  10-07        MEDICATIONS  (STANDING):  cholecalciferol 1000 Unit(s) Oral daily  influenza   Vaccine 0.5 milliLiter(s) IntraMuscular once  lactobacillus acidophilus 1 Tablet(s) Oral every 8 hours  vancomycin    Solution 125 milliGRAM(s) Oral every 6 hours    MEDICATIONS  (PRN):  acetaminophen   Tablet .. 650 milliGRAM(s) Oral every 6 hours PRN Temp greater or equal to 38C (100.4F)  acetaminophen  IVPB .. 1000 milliGRAM(s) IV Intermittent once PRN Mild Pain (1 - 3)  morphine  - Injectable 1 milliGRAM(s) IV Push every 6 hours PRN Moderate Pain (4 - 6)  ondansetron Injectable 4 milliGRAM(s) IV Push every 8 hours PRN Nausea and/or Vomiting        RADIOLOGY & ADDITIONAL TESTS:    10/6/18: CT Abdomen and Pelvis w/ IV Cont (10.06.18 @ 23:53) Pelvic nodes versus cystic neoplasms. Diffuse colitis,  Thickening of the colon, primarily involving the descending and descending colons, down to the rectum.        MICROBIOLOGY DATA:      HIV-1/2 Antigen/Antibody Screen by CMIA (10.10.18 @ 18:31)    HIV-1/2 Combo Result: Nonreact: The HIV Ag/Ab Combo test performed screens for HIV-1 p24 antigen,  antibodies to HIV-1 (group M and group O), and antibodies to HIV-2. All  specimens repeatedly reactive will reflex to an HIV 1/2 antibody  confirmation and differentiation test. This assay detects p24 antigen  which may be present prior to the development of HIV antibodies,  therefore a reactive result with a negative HIV 1/2 AB Confirmation  should be followed up with HIV-1 RNA, HIV-2 RNA and repeat testing in 4-8  weeks. A nonreactive result does not preclude previous exposure to or  infection with HIV-1 or HIV-2. [facility] prohibits disclosure of this  result to any unauthorized party.        Culture - Acid Fast - Stool w/Smear . (10.07.18 @ 16:43)    Specimen Source: .Stool Stool    Acid Fast Bacilli Smear:   Moderate Acid fast bacillus seen by fluorochrome stain.      Clostridium difficile Toxin by PCR (10.07.18 @ 11:21)    Clostridium difficile Toxin by PCR: The results of this test should be interpreted with consideration of all  clinical and laboratory findings. This test determines the presence of  the C. difficile tcdB gene at a given time and is not intended to  identify antibiotic associated disease or C. difficile infection without  clinical context. Successful treatment is based on the resolution of  clinical symptoms. This test should not be used as a "test of cure"  because C. difficile DNA will persist after successful treatment. Repeat  testing will not be permitted.    This test is performed on the BD MAX system using Real-Time PCR and  fluorogenic target-specific hybridization.    C Diff by PCR Result: Detected

## 2018-10-13 NOTE — DIETITIAN INITIAL EVALUATION ADULT. - PROBLEM SELECTOR PLAN 1
Patient came with watery diarrhea, associated with nausea, vomiting, low grade fever   on admission ow grade fever Tmax 99.8, tachycardia hR 112   cbc showed leucocytosis 23k   CT abd showed Diffuse pancolonic wall thickening from the cecum to the rectum   consistent with colitis which may be infectious or inflammatory. Hyperemia of the appendix is likely reactive.  Diarrhea likely infective etiology   r/o C diff   f/u C diff, Stool ova parasite, gram stain, blood cultures, GI PCR   will start on flagyl 500mg Q8 hrs and PO vancomycin 500 mg TID   failed outpatient flagyl therapy   ID consult Dr Altamirano   continue with IV fluids  isolation precaution

## 2018-10-13 NOTE — PROGRESS NOTE ADULT - PROBLEM SELECTOR PLAN 4
Stable H&H 10/32; no signs of active bleed, patient stated recently had EGD, colonoscopy, and capsule endoscopy w/ ulcers in small intestine   - FOBT negative and Iron studies consistent w/ anemia of chronic disease  ***Avoid NSAIDs; consider H2RA if symptomatic

## 2018-10-13 NOTE — PROGRESS NOTE ADULT - PROBLEM SELECTOR PLAN 3
Resolved; on admission low grade fever T max 99.8, tachycardia , WBC 23K, and CT showed diffuse pancolonic wall thickening from the cecum to the rectum   consistent with colitis which may be infectious or inflammatory.

## 2018-10-13 NOTE — PROGRESS NOTE ADULT - ASSESSMENT
Patient is a 78y old  Female who presents with a chief complaint of Diarrhea which  is watery, foul smelling associated with Abdominal pain, fever, chills, nausea and NBNB vomiting. Patient recently travelled to [place], admitted for 10 days in  [facility] for bloody Diarrhea, underwent EGD, Colonoscopy and capsule Endoscopy, found to have ulcers in small intestine. Patient was discharged on oral flagyl, she took for 10 days and diarrhea resolved. [place] back to [place] about 3 [place]  ago and having diarrhea again. In the ER, she found to have low grade fever, tachycardia, Leukocytosis and diffuse colitis on CT abd/pelvis. The stool for C.difficlie toxin PCR is positive. She has started on oral Vancomycin and ID flagyl. The ID consult  requested to assist with further evaluation and antibiotic  management.       # Diffuse colitis  # C.difficile colitis  # AFB positive ib stool    would recommend:    1. Change oral vancomycin  dose to 125 mg q 6hours and continue until 10/21/18   2. Follow up final AFB gene probe  3. Avoid systemic antibiotics if possible  4. OOB to chair     d/w patient    will follow the patient with you Patient is a 78y old  Female who presents with a chief complaint of Diarrhea which  is watery, foul smelling associated with Abdominal pain, fever, chills, nausea and NBNB vomiting. Patient recently travelled to [place], admitted for 10 days in  [facility] for bloody Diarrhea, underwent EGD, Colonoscopy and capsule Endoscopy, found to have ulcers in small intestine. Patient was discharged on oral flagyl, she took for 10 days and diarrhea resolved. [place] back [place] about 3 [place]  ago and having diarrhea again. In the ER, she found to have low grade fever, tachycardia, Leukocytosis and diffuse colitis on CT abd/pelvis. The stool for C.difficlie toxin PCR is positive. She has started on oral Vancomycin and ID flagyl. The ID consult  requested to assist with further evaluation and antibiotic  management.       # Diffuse colitis  # C.difficile colitis  # AFB positive ib stool    would recommend:    1. Continue oral vancomycin  dose to 125 mg q 6hours and continue until 10/21/18   2. Follow up final AFB gene probe  3. Avoid systemic antibiotics if possible  4. OOB to chair     d/w patient    will follow the patient with you

## 2018-10-13 NOTE — PROGRESS NOTE ADULT - PROBLEM SELECTOR PLAN 1
No further episodes of diarrhea; no abdominal tenderness w/ palpation  -CT abd showed Diffuse pancolonic wall thickening from the cecum to the rectum   consistent with colitis which may be infectious or inflammatory.  - C/w isolation precautions, PO vancomycin 125 mg QID per ID until 10/21   ID Dr Evelia Andrade

## 2018-10-13 NOTE — PROGRESS NOTE ADULT - ASSESSMENT
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 As per patient she is having multiple episodes of watery diarrhea ( 10 - 12 episodes in 24 hrs) mixed with Bright red blood. Patient have intermitted episodes of Diarrhea since 1 week went to see her PCP underwent some test but no significant improvement in Diarrhea. Diarrhea is watery, foul smelling associated with Abdominal pain, fever, chills, nausea and NBNB vomiting. Patient recently travelled to [place], admitted for 10 days for bloody Diarrhea, underwent EGD, Colonoscopy and capsule Endoscopy, found to have ulcers in small intestine. Patient was discharged on oral flagyl, she took for 10 days and diarrhea resolved. patient came to back [place] and having diarrhea again. Patient denies chest pain, sob, dysuria, skin rashes, or dysuria.     on admission patient is low grade fever Tmax 99.8, tachycardiac, , /65, CBC 23K, HB 10.5, . lactate normal   CT showed pancoliitis   Cdiff pos  Mod AFB in stool- awaiting AFB gene probe

## 2018-10-13 NOTE — DIETITIAN INITIAL EVALUATION ADULT. - OTHER INFO
Pt visited. On contact isolation. Pt reports + Diarrhea x  ~ 1 months.  Pt traveled to   [place] on 08/24  there is got sick and was Hospitalized fot 10 days d/t severe diarrhea.  [place] and still Diarrhea Continued  so hence she  was Hospitalized here. + weight loss of ~ 15 lbs since then At present she went BM  once today . No diarrhea at present.  NKFA. Food choices updated.

## 2018-10-13 NOTE — PROGRESS NOTE ADULT - SUBJECTIVE AND OBJECTIVE BOX
PGY 2 Note discussed with primary attending    Patient is a 78y old  Female who presents with a chief complaint of Diarrhea (12 Oct 2018 19:23)    INTERVAL HPI/OVERNIGHT EVENTS: Patient seen and examined at bedside with no new complaints - diarrhea resolved, normal BM x 2 day prior, no abdominal pain, plannign for 2 weeks of PO Vancomycin for C. diff diarrhea - pending AFB gene probe as per ID    MEDICATIONS  (STANDING):  cholecalciferol 1000 Unit(s) Oral daily  influenza   Vaccine 0.5 milliLiter(s) IntraMuscular once  lactobacillus acidophilus 1 Tablet(s) Oral every 8 hours  vancomycin    Solution 125 milliGRAM(s) Oral every 6 hours    MEDICATIONS  (PRN):  acetaminophen   Tablet .. 650 milliGRAM(s) Oral every 6 hours PRN Temp greater or equal to 38C (100.4F)  acetaminophen  IVPB .. 1000 milliGRAM(s) IV Intermittent once PRN Mild Pain (1 - 3)  morphine  - Injectable 1 milliGRAM(s) IV Push every 6 hours PRN Moderate Pain (4 - 6)  ondansetron Injectable 4 milliGRAM(s) IV Push every 8 hours PRN Nausea and/or Vomiting      __________________________________________________  REVIEW OF SYSTEMS:  RESPIRATORY: No cough; No shortness of breath  CARDIOVASCULAR: No chest pain, no palpitations  GASTROINTESTINAL: No pain. No nausea or vomiting; No diarrhea       Vital Signs Last 24 Hrs  T(C): 36.6 (13 Oct 2018 05:25), Max: 37.1 (12 Oct 2018 14:24)  T(F): 97.8 (13 Oct 2018 05:25), Max: 98.7 (12 Oct 2018 14:24)  HR: 81 (13 Oct 2018 05:25) (81 - 100)  BP: 119/74 (13 Oct 2018 05:25) (103/55 - 131/63)  BP(mean): --  RR: 16 (13 Oct 2018 05:25) (16 - 17)  SpO2: 97% (13 Oct 2018 05:25) (94% - 97%)    ________________________________________________  PHYSICAL EXAM:  GENERAL: NAD  HEENT: Normocephalic;  conjunctivae and sclerae clear; moist mucous membranes;   NECK : supple  CHEST/LUNG: Clear to auscultation bilaterally with good air entry   HEART: S1 S2  regular; no murmurs, gallops or rubs  ABDOMEN: Soft, Nontender, Nondistended; Bowel sounds present  EXTREMITIES: No cyanosis; no edema; no calf tenderness  NERVOUS SYSTEM:  Awake and alert; Oriented to place, person and time ; no new deficits    _________________________________________________  LABS:                        10.0   6.8   )-----------( 461      ( 13 Oct 2018 06:40 )             32.4     10-13    138  |  105  |  11  ----------------------------<  87  4.2   |  28  |  0.63    Ca    9.1      13 Oct 2018 06:40          CAPILLARY BLOOD GLUCOSE            RADIOLOGY & ADDITIONAL TESTS:    Imaging Personally Reviewed:  YES    Consultant(s) Notes Reviewed:   YES    Care Discussed with Consultants : YES    Plan of care was discussed with patient and /or primary care giver; all questions and concerns were addressed and care was aligned with patient's wishes.

## 2018-10-13 NOTE — CHART NOTE - NSCHARTNOTEFT_GEN_A_CORE
Upon Nutritional Assessment by the Registered Dietitian your patient was determined to meet criteria / has evidence of the following diagnosis/diagnoses:          [ ]  Mild Protein Calorie Malnutrition        [ ]  Moderate Protein Calorie Malnutrition        [x ] Severe Protein Calorie Malnutrition        [ ] Unspecified Protein Calorie Malnutrition        [ ] Underweight / BMI <19        [ ] Morbid Obesity / BMI > 40      Findings as based on:  •  Comprehensive nutrition assessment and consultation  •  Calorie counts (nutrient intake analysis)  •  Food acceptance and intake status from observations by staff  •  Follow up  •  Patient education  •  Intervention secondary to interdisciplinary rounds  •   concerns      Treatment:    The following diet has been recommended:  Ensure clear TID.        PROVIDER Section:     By signing this assessment you are acknowledging and agree with the diagnosis/diagnoses assigned by the Registered Dietitian    Comments:

## 2018-10-14 LAB
ANION GAP SERPL CALC-SCNC: 7 MMOL/L — SIGNIFICANT CHANGE UP (ref 5–17)
BUN SERPL-MCNC: 12 MG/DL — SIGNIFICANT CHANGE UP (ref 7–18)
CALCIUM SERPL-MCNC: 9.2 MG/DL — SIGNIFICANT CHANGE UP (ref 8.4–10.5)
CHLORIDE SERPL-SCNC: 105 MMOL/L — SIGNIFICANT CHANGE UP (ref 96–108)
CO2 SERPL-SCNC: 27 MMOL/L — SIGNIFICANT CHANGE UP (ref 22–31)
CREAT SERPL-MCNC: 0.64 MG/DL — SIGNIFICANT CHANGE UP (ref 0.5–1.3)
GLUCOSE SERPL-MCNC: 87 MG/DL — SIGNIFICANT CHANGE UP (ref 70–99)
HCT VFR BLD CALC: 31.9 % — LOW (ref 34.5–45)
HGB BLD-MCNC: 9.8 G/DL — LOW (ref 11.5–15.5)
MCHC RBC-ENTMCNC: 28.8 PG — SIGNIFICANT CHANGE UP (ref 27–34)
MCHC RBC-ENTMCNC: 30.8 GM/DL — LOW (ref 32–36)
MCV RBC AUTO: 93.6 FL — SIGNIFICANT CHANGE UP (ref 80–100)
PLATELET # BLD AUTO: 476 K/UL — HIGH (ref 150–400)
POTASSIUM SERPL-MCNC: 4 MMOL/L — SIGNIFICANT CHANGE UP (ref 3.5–5.3)
POTASSIUM SERPL-SCNC: 4 MMOL/L — SIGNIFICANT CHANGE UP (ref 3.5–5.3)
RBC # BLD: 3.41 M/UL — LOW (ref 3.8–5.2)
RBC # FLD: 16.6 % — HIGH (ref 10.3–14.5)
SODIUM SERPL-SCNC: 139 MMOL/L — SIGNIFICANT CHANGE UP (ref 135–145)
WBC # BLD: 6.8 K/UL — SIGNIFICANT CHANGE UP (ref 3.8–10.5)
WBC # FLD AUTO: 6.8 K/UL — SIGNIFICANT CHANGE UP (ref 3.8–10.5)

## 2018-10-14 RX ADMIN — Medication 125 MILLIGRAM(S): at 18:17

## 2018-10-14 RX ADMIN — Medication 125 MILLIGRAM(S): at 13:02

## 2018-10-14 RX ADMIN — Medication 125 MILLIGRAM(S): at 05:17

## 2018-10-14 RX ADMIN — Medication 125 MILLIGRAM(S): at 23:08

## 2018-10-14 RX ADMIN — Medication 1 TABLET(S): at 21:23

## 2018-10-14 RX ADMIN — Medication 1000 UNIT(S): at 13:02

## 2018-10-14 RX ADMIN — Medication 1 TABLET(S): at 13:02

## 2018-10-14 RX ADMIN — Medication 1 TABLET(S): at 05:17

## 2018-10-14 NOTE — PROGRESS NOTE ADULT - SUBJECTIVE AND OBJECTIVE BOX
Patient is seen and examined at the bed side, is afebrile.  She is doing better, no new complaints. The final work up of stool AFB still  pending.       REVIEW OF SYSTEMS: All other review systems are negative      ALLERGIES: NKDA      Vital Signs Last 24 Hrs  T(C): 36.7 (14 Oct 2018 14:26), Max: 36.7 (13 Oct 2018 20:31)  T(F): 98 (14 Oct 2018 14:26), Max: 98.1 (13 Oct 2018 20:31)  HR: 87 (14 Oct 2018 14:26) (83 - 87)  BP: 114/60 (14 Oct 2018 14:26) (102/63 - 114/60)  BP(mean): --  RR: 18 (14 Oct 2018 14:26) (16 - 18)  SpO2: 99% (14 Oct 2018 14:26) (96% - 100%)      PHYSICAL EXAM:  GENERAL: Not in distress  CHEST/LUNG: Air entry bilaterally   HEART: s1 and s2 present  ABDOMEN: Nontender, and Nondistended  EXTREMITIES:  No pedal edema  CNS: AAOX3        LABS:                        9.8    6.8   )-----------( 476      ( 14 Oct 2018 07:00 )             31.9                9.1    19.1  )-----------( 294      ( 07 Oct 2018 07:20 )             28.4       10-14    139  |  105  |  12  ----------------------------<  87  4.0   |  27  |  0.64    Ca    9.2      14 Oct 2018 07:00      10-13    138  |  105  |  11  ----------------------------<  87  4.2   |  28  |  0.63    Ca    9.1      13 Oct 2018 06:40    TPro  6.1  /  Alb  2.6<L>  /  TBili  0.4  /  DBili  x   /  AST  11  /  ALT  13  /  AlkPhos  45  10-07        MEDICATIONS  (STANDING):  cholecalciferol 1000 Unit(s) Oral daily  influenza   Vaccine 0.5 milliLiter(s) IntraMuscular once  lactobacillus acidophilus 1 Tablet(s) Oral every 8 hours  vancomycin  Solution 125 milliGRAM(s) Oral every 6 hours    MEDICATIONS  (PRN):  acetaminophen   Tablet .. 650 milliGRAM(s) Oral every 6 hours PRN Temp greater or equal to 38C (100.4F)  acetaminophen  IVPB .. 1000 milliGRAM(s) IV Intermittent once PRN Mild Pain (1 - 3)  morphine  - Injectable 1 milliGRAM(s) IV Push every 6 hours PRN Moderate Pain (4 - 6)  ondansetron Injectable 4 milliGRAM(s) IV Push every 8 hours PRN Nausea and/or Vomiting      RADIOLOGY & ADDITIONAL TESTS:    10/6/18: CT Abdomen and Pelvis w/ IV Cont (10.06.18 @ 23:53) Pelvic nodes versus cystic neoplasms. Diffuse colitis,  Thickening of the colon, primarily involving the descending and descending colons, down to the rectum.        MICROBIOLOGY DATA:      HIV-1/2 Antigen/Antibody Screen by [facility] (10.10.18 @ 18:31)    HIV-1/2 Combo Result: Nonreact: The HIV Ag/Ab Combo test performed screens for HIV-1 p24 antigen,  antibodies to HIV-1 (group M and group O), and antibodies to HIV-2. All  specimens repeatedly reactive will reflex to an HIV 1/2 antibody  confirmation and differentiation test. This assay detects p24 antigen  which may be present prior to the development of HIV antibodies,  therefore a reactive result with a negative HIV 1/2 AB Confirmation  should be followed up with HIV-1 RNA, HIV-2 RNA and repeat testing in 4-8  weeks. A nonreactive result does not preclude previous exposure to or  infection with HIV-1 or HIV-2. [facility] prohibits disclosure of this  result to any unauthorized party.        Culture - Acid Fast - Stool w/Smear . (10.07.18 @ 16:43)    Specimen Source: .Stool Stool    Acid Fast Bacilli Smear:   Moderate Acid fast bacillus seen by fluorochrome stain.      Clostridium difficile Toxin by PCR (10.07.18 @ 11:21)    Clostridium difficile Toxin by PCR: The results of this test should be interpreted with consideration of all  clinical and laboratory findings. This test determines the presence of  the C. difficile tcdB gene at a given time and is not intended to  identify antibiotic associated disease or C. difficile infection without  clinical context. Successful treatment is based on the resolution of  clinical symptoms. This test should not be used as a "test of cure"  because C. difficile DNA will persist after successful treatment. Repeat  testing will not be permitted.    This test is performed on the BD MAX system using Real-Time PCR and  fluorogenic target-specific hybridization.    C Diff by PCR Result: Detected

## 2018-10-14 NOTE — PROGRESS NOTE ADULT - SUBJECTIVE AND OBJECTIVE BOX
INTERVAL HPI/OVERNIGHT EVENTS:  events foro vernight noticed,no dystress,afebrile    VITAL SIGNS:  T(F): 98.1 (10-14-18 @ 05:00)  HR: 83 (10-14-18 @ 05:00)  BP: 102/63 (10-14-18 @ 05:00)  RR: 16 (10-14-18 @ 05:00)  SpO2: 100% (10-14-18 @ 05:00)  Wt(kg): --    PHYSICAL EXAM:awake    Constitutional:  ENMT:perrla  Neck:supple  Respiratory:few rales  Cardiovascular:s1 s2,m-none  Gastrointestinal:soft,non-tender  Vascular:  Neurological:no focal deficit  Musculoskeletal:    MEDICATIONS  (STANDING):  cholecalciferol 1000 Unit(s) Oral daily  influenza   Vaccine 0.5 milliLiter(s) IntraMuscular once  lactobacillus acidophilus 1 Tablet(s) Oral every 8 hours  vancomycin    Solution 125 milliGRAM(s) Oral every 6 hours    MEDICATIONS  (PRN):  acetaminophen   Tablet .. 650 milliGRAM(s) Oral every 6 hours PRN Temp greater or equal to 38C (100.4F)  acetaminophen  IVPB .. 1000 milliGRAM(s) IV Intermittent once PRN Mild Pain (1 - 3)  morphine  - Injectable 1 milliGRAM(s) IV Push every 6 hours PRN Moderate Pain (4 - 6)  ondansetron Injectable 4 milliGRAM(s) IV Push every 8 hours PRN Nausea and/or Vomiting      Allergies    aspirin (Unknown)    Intolerances        LABS:                        9.8    6.8   )-----------( 476      ( 14 Oct 2018 07:00 )             31.9     10-14    139  |  105  |  12  ----------------------------<  87  4.0   |  27  |  0.64    Ca    9.2      14 Oct 2018 07:00    Assessment and Plan:   · Assessment		   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 As per patient she is having multiple episodes of watery diarrhea ( 10 - 12 episodes in 24 hrs) mixed with Bright red blood. Patient have intermitted episodes of Diarrhea since 1 week went to see her PCP underwent some test but no significant improvement in Diarrhea. Diarrhea is watery, foul smelling associated with Abdominal pain, fever, chills, nausea and NBNB vomiting. Patient recently travelled to [place], admitted for 10 days for bloody Diarrhea, underwent EGD, Colonoscopy and capsule Endoscopy, found to have ulcers in small intestine. Patient was discharged on oral flagyl, she took for 10 days and diarrhea resolved. [place] back [place] and having diarrhea again. Patient denies chest pain, sob, dysuria, skin rashes, or dysuria.     on admission patient is low grade fever Tmax 99.8, tachycardiac, , /65, CBC 23K, HB 10.5, . lactate normal   CT showed pancoliitis   Cdiff pos  Mod AFB in stool- awaiting AFB gene probe      Problem/Plan - 1:  ·  Problem: Clostridium difficile colitis.  Plan: -Diarrhea improving- 1 episode overnight   -CT abd showed Diffuse pancolonic wall thickening from the cecum to the rectum   consistent with colitis which may be infectious or inflammatory. Hyperemia of the appendix is likely reactive.  -C.diff positive  -c/w PO vancomycin 500 mg TID per ID recs- day 5/14  -Pt no longer having diarrhea, and stools are now formed  -will adv diet to regular  -Isolation precaution  -ID consult Dr Altamirano  -GI: Dr. Andrade.      Problem/Plan - 2:  ·  Problem: Positive stool culture.  Plan: -Moderate AFB in stool  -Follow up final AFB gene probe  -ID: Dr. Altamirano.      Problem/Plan - 3:  ·  Problem: Sepsis.-stablel clinically  -c/w flagyl 500mg Q8 hrs and PO vancomycin 500 mg TID   -continue with IV fluids  -Isolation precaution  -ID: consult Dr Altamirano.   -cont abx as per id     Problem/Plan - 4:  ·  Problem: Anemia.  Plan: -Stable- no signs of active bleed  -as per patient recently had EGD, Colonoscopy and capsule endoscopy   -pt was told she had ulcers in small intestine   -Iron studies consistent w/ anemia of chronic disease.      Problem/Plan - 5:  ·  Problem: Hemophilia.  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      Problem/Plan - 6:  Problem: Prophylactic measure. Plan: IMPROVE VTE Individual Risk Assessment          RISK                                                          Points  [  ] Previous VTE                                                3  [  ] Thrombophilia                                             2  [  ] Lower limb paralysis                                   2        (unable to hold up >15 seconds)    [  ] Current Cancer                                             2         (within 6 months)  [x  ] Immobilization > 24 hrs                              1  [  ] ICU/CCU stay > 24 hours                             1  [ x ] Age > 60                                                         1    IMPROVE VTE Score: vte score 2   Lovenox sub q for DVT prophy.    covering for nicolasa

## 2018-10-14 NOTE — PROGRESS NOTE ADULT - SUBJECTIVE AND OBJECTIVE BOX
S: no chest pain or sob     acetaminophen   Tablet .. 650 milliGRAM(s) Oral every 6 hours PRN  acetaminophen  IVPB .. 1000 milliGRAM(s) IV Intermittent once PRN  cholecalciferol 1000 Unit(s) Oral daily  influenza   Vaccine 0.5 milliLiter(s) IntraMuscular once  lactobacillus acidophilus 1 Tablet(s) Oral every 8 hours  morphine  - Injectable 1 milliGRAM(s) IV Push every 6 hours PRN  ondansetron Injectable 4 milliGRAM(s) IV Push every 8 hours PRN  vancomycin    Solution 125 milliGRAM(s) Oral every 6 hours                            9.8    6.8   )-----------( 476      ( 14 Oct 2018 07:00 )             31.9       10-14    139  |  105  |  12  ----------------------------<  87  4.0   |  27  |  0.64    Ca    9.2      14 Oct 2018 07:00              T(C): 36.7 (10-14-18 @ 05:00), Max: 36.7 (10-13-18 @ 20:31)  HR: 83 (10-14-18 @ 05:00) (83 - 91)  BP: 102/63 (10-14-18 @ 05:00) (102/63 - 109/60)  RR: 16 (10-14-18 @ 05:00) (16 - 18)  SpO2: 100% (10-14-18 @ 05:00) (96% - 100%)  Wt(kg): --    I&O's Summary      Appears well in NAD  HEENT:  (-)icterus (-)pallor  CV: N S1 S2 1/6 NANCI (+)2 Pulses B/l  Resp:  Clear to ausculatation B/L, normal effort  GI: (+) BS Soft, NT, ND  Lymph:  (-)Edema, (-)obvious lymphadenopathy  Skin: Warm to touch, Normal turgor  Psych: Appropriate mood and affect        ASSESSMENT/PLAN: 	78y  Female rom home with PMHx of  diverticulosis,  PUD,  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 As per patient she is having multiple episodes of watery diarrhea  and hypotension normal LV fx    - Echo without pertinent findings  - no clinical heart failure or anginal symptoms   - No need for further inpatient cardiac work up.  - primary care per medicine     Milagros Burnett MD

## 2018-10-14 NOTE — PROGRESS NOTE ADULT - ASSESSMENT
Patient is a 78y old  Female who presents with a chief complaint of Diarrhea which  is watery, foul smelling associated with Abdominal pain, fever, chills, nausea and NBNB vomiting. Patient recently travelled to [place], admitted for 10 days in  [facility] for bloody Diarrhea, underwent EGD, Colonoscopy and capsule Endoscopy, found to have ulcers in small intestine. Patient was discharged on oral flagyl, she took for 10 days and diarrhea resolved. [place] about [place]  ago and having diarrhea again. In the ER, she found to have low grade fever, tachycardia, Leukocytosis and diffuse colitis on CT abd/pelvis. The stool for C.difficlie toxin PCR is positive. She has started on oral Vancomycin and ID flagyl. The ID consult  requested to assist with further evaluation and antibiotic  management.       # Diffuse colitis  # C.difficile colitis  # AFB positive ib stool    would recommend:    1. Follow up final AFB gene probe, not finalized yet  2. Continue oral vancomycin  dose to 125 mg q 6hours and continue until 10/21/18   3. Avoid systemic antibiotics if possible  4. OOB to chair     d/w patient

## 2018-10-15 LAB
ANION GAP SERPL CALC-SCNC: 8 MMOL/L — SIGNIFICANT CHANGE UP (ref 5–17)
BUN SERPL-MCNC: 12 MG/DL — SIGNIFICANT CHANGE UP (ref 7–18)
CALCIUM SERPL-MCNC: 9.2 MG/DL — SIGNIFICANT CHANGE UP (ref 8.4–10.5)
CHLORIDE SERPL-SCNC: 105 MMOL/L — SIGNIFICANT CHANGE UP (ref 96–108)
CO2 SERPL-SCNC: 27 MMOL/L — SIGNIFICANT CHANGE UP (ref 22–31)
CREAT SERPL-MCNC: 0.6 MG/DL — SIGNIFICANT CHANGE UP (ref 0.5–1.3)
GLUCOSE SERPL-MCNC: 90 MG/DL — SIGNIFICANT CHANGE UP (ref 70–99)
HCT VFR BLD CALC: 33.8 % — LOW (ref 34.5–45)
HGB BLD-MCNC: 10.5 G/DL — LOW (ref 11.5–15.5)
MCHC RBC-ENTMCNC: 29 PG — SIGNIFICANT CHANGE UP (ref 27–34)
MCHC RBC-ENTMCNC: 30.9 GM/DL — LOW (ref 32–36)
MCV RBC AUTO: 93.8 FL — SIGNIFICANT CHANGE UP (ref 80–100)
PLATELET # BLD AUTO: 477 K/UL — HIGH (ref 150–400)
POTASSIUM SERPL-MCNC: 3.9 MMOL/L — SIGNIFICANT CHANGE UP (ref 3.5–5.3)
POTASSIUM SERPL-SCNC: 3.9 MMOL/L — SIGNIFICANT CHANGE UP (ref 3.5–5.3)
RBC # BLD: 3.6 M/UL — LOW (ref 3.8–5.2)
RBC # FLD: 16.9 % — HIGH (ref 10.3–14.5)
SODIUM SERPL-SCNC: 140 MMOL/L — SIGNIFICANT CHANGE UP (ref 135–145)
WBC # BLD: 7.9 K/UL — SIGNIFICANT CHANGE UP (ref 3.8–10.5)
WBC # FLD AUTO: 7.9 K/UL — SIGNIFICANT CHANGE UP (ref 3.8–10.5)

## 2018-10-15 RX ADMIN — Medication 125 MILLIGRAM(S): at 12:30

## 2018-10-15 RX ADMIN — Medication 1 TABLET(S): at 14:23

## 2018-10-15 RX ADMIN — Medication 1 TABLET(S): at 21:23

## 2018-10-15 RX ADMIN — Medication 1 TABLET(S): at 05:17

## 2018-10-15 RX ADMIN — Medication 1000 UNIT(S): at 14:23

## 2018-10-15 RX ADMIN — Medication 125 MILLIGRAM(S): at 05:17

## 2018-10-15 RX ADMIN — Medication 125 MILLIGRAM(S): at 19:00

## 2018-10-15 NOTE — PROGRESS NOTE ADULT - SUBJECTIVE AND OBJECTIVE BOX
==================PGY 2 Note===================   Discussed with primary attending    ================CHIEF COMPLAINT===============  Patient is a 78y old  Female who presents with a chief complaint of Diarrhea (14 Oct 2018 19:12)        =========INTERVAL HPI/OVERNIGHT EVENTS=========  Offers no new complaints      ============CURRENT MEDICATIONS===============    MEDICATIONS  (STANDING):  cholecalciferol 1000 Unit(s) Oral daily  influenza   Vaccine 0.5 milliLiter(s) IntraMuscular once  lactobacillus acidophilus 1 Tablet(s) Oral every 8 hours  vancomycin    Solution 125 milliGRAM(s) Oral every 6 hours    MEDICATIONS  (PRN):  acetaminophen   Tablet .. 650 milliGRAM(s) Oral every 6 hours PRN Temp greater or equal to 38C (100.4F)  acetaminophen  IVPB .. 1000 milliGRAM(s) IV Intermittent once PRN Mild Pain (1 - 3)  ondansetron Injectable 4 milliGRAM(s) IV Push every 8 hours PRN Nausea and/or Vomiting        ============REVIEW OF SYSTEMS==================    CONSTITUTIONAL: No fever  EYES: no acute visual disturbances  NECK: No pain or stiffness  RESPIRATORY: No cough; No shortness of breath  CARDIOVASCULAR: No chest pain, no palpitations  GASTROINTESTINAL: No pain. No nausea or vomiting; No diarrhea   NEUROLOGICAL: No headache or numbness, no tremors  MUSCULOSKELETAL: No joint pain, no muscle pain  GENITOURINARY: no dysuria, no frequency, no hesitancy  PSYCHIATRY: no depression , no anxiety  ALL OTHER  ROS negative      ================VITALS SIGNS=====================  Vital Signs Last 24 Hrs  T(C): 36.6 (15 Oct 2018 05:53), Max: 36.7 (14 Oct 2018 14:26)  T(F): 97.9 (15 Oct 2018 05:53), Max: 98 (14 Oct 2018 14:26)  HR: 83 (15 Oct 2018 05:53) (82 - 87)  BP: 120/76 (15 Oct 2018 05:53) (114/60 - 120/76)  BP(mean): --  RR: 18 (15 Oct 2018 05:53) (18 - 18)  SpO2: 98% (15 Oct 2018 05:53) (98% - 100%)    ===============PHYSICAL EXAM====================    GENERAL: NAD  HEENT: Normocephalic;  conjunctivae and sclerae clear; moist mucous membranes;   NECK : supple  CHEST/LUNG: Clear to auscultation bilaterally with good air entry   HEART: S1 S2  regular; no murmurs, gallops or rubs  ABDOMEN: Soft, Nontender, Nondistended; Bowel sounds present  EXTREMITIES: no cyanosis; no edema; no calf tenderness  SKIN: warm and dry; no rash  NERVOUS SYSTEM:  Awake and alert; Oriented  to place, person and time    ==============LABORATORIES======================  LABS:                        10.5   7.9   )-----------( 477      ( 15 Oct 2018 08:07 )             33.8     10-15    140  |  105  |  12  ----------------------------<  90  3.9   |  27  |  0.60    Ca    9.2      15 Oct 2018 08:07          CAPILLARY BLOOD GLUCOSE          =============INPUTS/OUPUTS=====================        RADIOLOGY & ADDITIONAL TESTS:    Imaging Personally Reviewed:  YES    Consultant(s) Notes Reviewed:   YES    Care Discussed with Consultants : YES    Plan of care was discussed with patient  and /or primary care giver; all questions and concerns were addressed and care was aligned with patient's wishes. Time was allowed for questions that were answered to the best of my abilities

## 2018-10-15 NOTE — PROGRESS NOTE ADULT - ASSESSMENT
Patient is a 78y old  Female who presents with a chief complaint of Diarrhea which  is watery, foul smelling associated with Abdominal pain, fever, chills, nausea and NBNB vomiting. Patient recently travelled to [place], admitted for 10 days in  [facility] for bloody Diarrhea, underwent EGD, Colonoscopy and capsule Endoscopy, found to have ulcers in small intestine. Patient was discharged on oral flagyl, she took for 10 days and diarrhea resolved. [place]  ago and having diarrhea again. In the ER, she found to have low grade fever, tachycardia, Leukocytosis and diffuse colitis on CT abd/pelvis. The stool for C.difficlie toxin PCR is positive. She has started on oral Vancomycin and ID flagyl. The ID consult  requested to assist with further evaluation and antibiotic  management.       # Diffuse colitis  # C.difficile colitis  # AFB positive ib stool    would recommend:    1. Follow up final AFB gene probe, not finalized yet  2. Continue oral vancomycin  dose to 125 mg q 6hours and continue until 10/21/18   3. Avoid systemic antibiotics if possible  4. OOB to chair     d/w patient Patient is a 78y old  Female who presents with a chief complaint of Diarrhea which  is watery, foul smelling associated with Abdominal pain, fever, chills, nausea and NBNB vomiting. Patient recently travelled to [place], admitted for 10 days in  [facility] for bloody Diarrhea, underwent EGD, Colonoscopy and capsule Endoscopy, found to have ulcers in small intestine. Patient was discharged on oral flagyl, she took for 10 days and diarrhea resolved. [place] about [place]  ago and having diarrhea again. In the ER, she found to have low grade fever, tachycardia, Leukocytosis and diffuse colitis on CT abd/pelvis. The stool for C.difficlie toxin PCR is positive. She has started on oral Vancomycin and ID flagyl. The ID consult  requested to assist with further evaluation and antibiotic  management.       # Diffuse colitis  # C.difficile colitis  # AFB positive ib stool    would recommend:    1. OOB to chair   2. Follow up final AFB gene probe, not finalized yet  3. Continue oral vancomycin  dose to 125 mg q 6hours and continue until 10/21/18   4. Avoid systemic antibiotics if possible       d/w patient

## 2018-10-15 NOTE — PROGRESS NOTE ADULT - ASSESSMENT
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 As per patient she is having multiple episodes of watery diarrhea ( 10 - 12 episodes in 24 hrs) mixed with Bright red blood. Patient have intermitted episodes of Diarrhea since 1 week went to see her PCP underwent some test but no significant improvement in Diarrhea. Diarrhea is watery, foul smelling associated with Abdominal pain, fever, chills, nausea and NBNB vomiting. Patient recently travelled to [place], admitted for 10 days for bloody Diarrhea, underwent EGD, Colonoscopy and capsule Endoscopy, found to have ulcers in small intestine. Patient was discharged on oral flagyl, she took for 10 days and diarrhea resolved. patient came [place] back [place] and [place] diarrhea again. Patient denies chest pain, sob, dysuria, skin rashes, or dysuria.     on admission patient is low grade fever Tmax 99.8, tachycardiac, , /65, CBC 23K, HB 10.5, . lactate normal   CT showed pancoliitis   Cdiff pos  Mod AFB in stool- awaiting AFB gene probe

## 2018-10-15 NOTE — PROGRESS NOTE ADULT - SUBJECTIVE AND OBJECTIVE BOX
Patient is seen and examined at the bed side, is afebrile.  She is doing better, no new complaints. The final work up of stool AFB still  pending.       REVIEW OF SYSTEMS: All other review systems are negative      ALLERGIES: NKDA      Vital Signs Last 24 Hrs  T(C): 37 (15 Oct 2018 14:19), Max: 37 (15 Oct 2018 14:19)  T(F): 98.6 (15 Oct 2018 14:19), Max: 98.6 (15 Oct 2018 14:19)  HR: 83 (15 Oct 2018 14:19) (82 - 83)  BP: 98/66 (15 Oct 2018 14:19) (98/66 - 120/76)  BP(mean): --  RR: 16 (15 Oct 2018 14:19) (16 - 18)  SpO2: 100% (15 Oct 2018 14:19) (98% - 100%)        PHYSICAL EXAM:  GENERAL: Not in distress  CHEST/LUNG: Air entry bilaterally   HEART: s1 and s2 present  ABDOMEN: Nontender, and Nondistended  EXTREMITIES:  No pedal edema  CNS: AAOX3        LABS:                        10.5   7.9   )-----------( 477      ( 15 Oct 2018 08:07 )             33.8                           9.8    6.8   )-----------( 476      ( 14 Oct 2018 07:00 )             31.9                9.1    19.1  )-----------( 294      ( 07 Oct 2018 07:20 )             28.4         10-15    140  |  105  |  12  ----------------------------<  90  3.9   |  27  |  0.60    Ca    9.2      15 Oct 2018 08:07      10-14    139  |  105  |  12  ----------------------------<  87  4.0   |  27  |  0.64    Ca    9.2      14 Oct 2018 07    TPro  6.1  /  Alb  2.6<L>  /  TBili  0.4  /  DBili  x   /  AST  11  /  ALT  13  /  AlkPhos  45  10-07        MEDICATIONS  (STANDING):  cholecalciferol 1000 Unit(s) Oral daily  influenza   Vaccine 0.5 milliLiter(s) IntraMuscular once  lactobacillus acidophilus 1 Tablet(s) Oral every 8 hours  vancomycin    Solution 125 milliGRAM(s) Oral every 6 hours    MEDICATIONS  (PRN):  acetaminophen   Tablet .. 650 milliGRAM(s) Oral every 6 hours PRN Temp greater or equal to 38C (100.4F)  acetaminophen  IVPB .. 1000 milliGRAM(s) IV Intermittent once PRN Mild Pain (1 - 3)  ondansetron Injectable 4 milliGRAM(s) IV Push every 8 hours PRN Nausea and/or Vomiting        RADIOLOGY & ADDITIONAL TESTS:    10/6/18: CT Abdomen and Pelvis w/ IV Cont (10.06.18 @ 23:53) Pelvic nodes versus cystic neoplasms. Diffuse colitis,  Thickening of the colon, primarily involving the descending and descending colons, down to the rectum.        MICROBIOLOGY DATA:      HIV-1/2 Antigen/Antibody Screen by CMIA (10.10.18 @ 18:31)    HIV-1/2 Combo Result: Nonreact: The HIV Ag/Ab Combo test performed screens for HIV-1 p24 antigen,  antibodies to HIV-1 (group M and group O), and antibodies to HIV-2. All  specimens repeatedly reactive will reflex to an HIV 1/2 antibody  confirmation and differentiation test. This assay detects p24 antigen  which may be present prior to the development of HIV antibodies,  therefore a reactive result with a negative HIV 1/2 AB Confirmation  should be followed up with HIV-1 RNA, HIV-2 RNA and repeat testing in 4-8  weeks. A nonreactive result does not preclude previous exposure to or  infection with HIV-1 or HIV-2. [facility] prohibits disclosure of this  result to any unauthorized party.        Culture - Acid Fast - Stool w/Smear . (10.07.18 @ 16:43)    Specimen Source: .Stool Stool    Acid Fast Bacilli Smear:   Moderate Acid fast bacillus seen by fluorochrome stain.      Clostridium difficile Toxin by PCR (10.07.18 @ 11:21)    Clostridium difficile Toxin by PCR: The results of this test should be interpreted with consideration of all  clinical and laboratory findings. This test determines the presence of  the C. difficile tcdB gene at a given time and is not intended to  identify antibiotic associated disease or C. difficile infection without  clinical context. Successful treatment is based on the resolution of  clinical symptoms. This test should not be used as a "test of cure"  because C. difficile DNA will persist after successful treatment. Repeat  testing will not be permitted.    This test is performed on the BD MAX system using Real-Time PCR and  fluorogenic target-specific hybridization.    C Diff by PCR Result: Detected Patient is seen and examined at the bed side, is afebrile.  No new events. The final work up of stool AFB still  pending.       REVIEW OF SYSTEMS: All other review systems are negative      ALLERGIES: NKDA      Vital Signs Last 24 Hrs  T(C): 37 (15 Oct 2018 14:19), Max: 37 (15 Oct 2018 14:19)  T(F): 98.6 (15 Oct 2018 14:19), Max: 98.6 (15 Oct 2018 14:19)  HR: 83 (15 Oct 2018 14:19) (82 - 83)  BP: 98/66 (15 Oct 2018 14:19) (98/66 - 120/76)  BP(mean): --  RR: 16 (15 Oct 2018 14:19) (16 - 18)  SpO2: 100% (15 Oct 2018 14:19) (98% - 100%)        PHYSICAL EXAM:  GENERAL: Not in distress  CHEST/LUNG: Air entry bilaterally   HEART: s1 and s2 present  ABDOMEN: Nontender, and Nondistended  EXTREMITIES:  No pedal edema  CNS: AAOX3        LABS:                        10.5   7.9   )-----------( 477      ( 15 Oct 2018 08:07 )             33.8                           9.8    6.8   )-----------( 476      ( 14 Oct 2018 07:00 )             31.9                9.1    19.1  )-----------( 294      ( 07 Oct 2018 07:20 )             28.4         10-15    140  |  105  |  12  ----------------------------<  90  3.9   |  27  |  0.60    Ca    9.2      15 Oct 2018 08:07      10-14    139  |  105  |  12  ----------------------------<  87  4.0   |  27  |  0.64    Ca    9.2      14 Oct 2018 07    TPro  6.1  /  Alb  2.6<L>  /  TBili  0.4  /  DBili  x   /  AST  11  /  ALT  13  /  AlkPhos  45  10-07        MEDICATIONS  (STANDING):  cholecalciferol 1000 Unit(s) Oral daily  influenza   Vaccine 0.5 milliLiter(s) IntraMuscular once  lactobacillus acidophilus 1 Tablet(s) Oral every 8 hours  vancomycin    Solution 125 milliGRAM(s) Oral every 6 hours    MEDICATIONS  (PRN):  acetaminophen   Tablet .. 650 milliGRAM(s) Oral every 6 hours PRN Temp greater or equal to 38C (100.4F)  acetaminophen  IVPB .. 1000 milliGRAM(s) IV Intermittent once PRN Mild Pain (1 - 3)  ondansetron Injectable 4 milliGRAM(s) IV Push every 8 hours PRN Nausea and/or Vomiting        RADIOLOGY & ADDITIONAL TESTS:    10/6/18: CT Abdomen and Pelvis w/ IV Cont (10.06.18 @ 23:53) Pelvic nodes versus cystic neoplasms. Diffuse colitis,  Thickening of the colon, primarily involving the descending and descending colons, down to the rectum.        MICROBIOLOGY DATA:      HIV-1/2 Antigen/Antibody Screen by CMIA (10.10.18 @ 18:31)    HIV-1/2 Combo Result: Nonreact: The HIV Ag/Ab Combo test performed screens for HIV-1 p24 antigen,  antibodies to HIV-1 (group M and group O), and antibodies to HIV-2. All  specimens repeatedly reactive will reflex to an HIV 1/2 antibody  confirmation and differentiation test. This assay detects p24 antigen  which may be present prior to the development of HIV antibodies,  therefore a reactive result with a negative HIV 1/2 AB Confirmation  should be followed up with HIV-1 RNA, HIV-2 RNA and repeat testing in 4-8  weeks. A nonreactive result does not preclude previous exposure to or  infection with HIV-1 or HIV-2. [facility] prohibits disclosure of this  result to any unauthorized party.        Culture - Acid Fast - Stool w/Smear . (10.07.18 @ 16:43)    Specimen Source: .Stool Stool    Acid Fast Bacilli Smear:   Moderate Acid fast bacillus seen by fluorochrome stain.      Clostridium difficile Toxin by PCR (10.07.18 @ 11:21)    Clostridium difficile Toxin by PCR: The results of this test should be interpreted with consideration of all  clinical and laboratory findings. This test determines the presence of  the C. difficile tcdB gene at a given time and is not intended to  identify antibiotic associated disease or C. difficile infection without  clinical context. Successful treatment is based on the resolution of  clinical symptoms. This test should not be used as a "test of cure"  because C. difficile DNA will persist after successful treatment. Repeat  testing will not be permitted.    This test is performed on the BD MAX system using Real-Time PCR and  fluorogenic target-specific hybridization.    C Diff by PCR Result: Detected

## 2018-10-15 NOTE — PROGRESS NOTE ADULT - PROBLEM SELECTOR PLAN 2
Moderate AFB in stool; no upper respiratory complaints, CXR clear lungs  - Follow up final AFB gene probe

## 2018-10-15 NOTE — PROGRESS NOTE ADULT - SUBJECTIVE AND OBJECTIVE BOX
Patient denies CP, SOB Review of systems otherwise (-)    acetaminophen   Tablet .. 650 milliGRAM(s) Oral every 6 hours PRN  acetaminophen  IVPB .. 1000 milliGRAM(s) IV Intermittent once PRN  cholecalciferol 1000 Unit(s) Oral daily  influenza   Vaccine 0.5 milliLiter(s) IntraMuscular once  lactobacillus acidophilus 1 Tablet(s) Oral every 8 hours  ondansetron Injectable 4 milliGRAM(s) IV Push every 8 hours PRN  vancomycin    Solution 125 milliGRAM(s) Oral every 6 hours                            10.5   7.9   )-----------( 477      ( 15 Oct 2018 08:07 )             33.8       Hemoglobin: 10.5 g/dL (10-15 @ 08:07)  Hemoglobin: 9.8 g/dL (10-14 @ 07:00)  Hemoglobin: 10.0 g/dL (10-13 @ 06:40)  Hemoglobin: 9.9 g/dL (10-12 @ 08:43)  Hemoglobin: 10.4 g/dL (10-11 @ 07:48)      10-15    140  |  105  |  12  ----------------------------<  90  3.9   |  27  |  0.60    Ca    9.2      15 Oct 2018 08:07      Creatinine Trend: 0.60<--, 0.64<--, 0.63<--, 0.60<--, 0.44<--, 0.43<--    COAGS:           T(C): 36.6 (10-15-18 @ 05:53), Max: 36.7 (10-14-18 @ 14:26)  HR: 83 (10-15-18 @ 05:53) (82 - 87)  BP: 120/76 (10-15-18 @ 05:53) (114/60 - 120/76)  RR: 18 (10-15-18 @ 05:53) (18 - 18)  SpO2: 98% (10-15-18 @ 05:53) (98% - 100%)  Wt(kg): --    I&O's Summary    Appears well in NAD  HEENT:  (-)icterus (-)pallor  CV: N S1 S2 1/6 NANCI (+)2 Pulses B/l  Resp:  Clear to ausculatation B/L, normal effort  GI: (+) BS Soft, NT, ND  Lymph:  (-)Edema, (-)obvious lymphadenopathy  Skin: Warm to touch, Normal turgor  Psych: Appropriate mood and affect        ASSESSMENT/PLAN: 	78y  Female rom home with PMHx of  diverticulosis,  PUD,  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 As per patient she is having multiple episodes of watery diarrhea  and hypotension normal LV fx    - Echo without pertinent findings  - no clinical heart failure or anginal symptoms   - No need for further inpatient cardiac work up.  - primary care per medicine     Alen Kothari MD, FACC

## 2018-10-16 RX ADMIN — Medication 1 TABLET(S): at 05:06

## 2018-10-16 RX ADMIN — Medication 1 TABLET(S): at 22:20

## 2018-10-16 RX ADMIN — Medication 125 MILLIGRAM(S): at 17:00

## 2018-10-16 RX ADMIN — Medication 125 MILLIGRAM(S): at 11:36

## 2018-10-16 RX ADMIN — Medication 125 MILLIGRAM(S): at 05:06

## 2018-10-16 RX ADMIN — Medication 125 MILLIGRAM(S): at 00:31

## 2018-10-16 RX ADMIN — Medication 1 TABLET(S): at 13:08

## 2018-10-16 RX ADMIN — Medication 1000 UNIT(S): at 11:36

## 2018-10-16 NOTE — PROGRESS NOTE ADULT - SUBJECTIVE AND OBJECTIVE BOX
==================PGY 2 Note===================   Discussed with primary attending    ================CHIEF COMPLAINT===============  Patient is a 78y old  Female who presents with a chief complaint of Diarrhea (15 Oct 2018 19:47)        =========INTERVAL HPI/OVERNIGHT EVENTS=========  Offers no new complaints      ============CURRENT MEDICATIONS===============    MEDICATIONS  (STANDING):  cholecalciferol 1000 Unit(s) Oral daily  influenza   Vaccine 0.5 milliLiter(s) IntraMuscular once  lactobacillus acidophilus 1 Tablet(s) Oral every 8 hours  vancomycin    Solution 125 milliGRAM(s) Oral every 6 hours    MEDICATIONS  (PRN):  acetaminophen   Tablet .. 650 milliGRAM(s) Oral every 6 hours PRN Temp greater or equal to 38C (100.4F)  acetaminophen  IVPB .. 1000 milliGRAM(s) IV Intermittent once PRN Mild Pain (1 - 3)  ondansetron Injectable 4 milliGRAM(s) IV Push every 8 hours PRN Nausea and/or Vomiting        ============REVIEW OF SYSTEMS==================    CONSTITUTIONAL: No fever  EYES: no acute visual disturbances  NECK: No pain or stiffness  RESPIRATORY: No cough; No shortness of breath  CARDIOVASCULAR: No chest pain, no palpitations  GASTROINTESTINAL: No pain. No nausea or vomiting; No diarrhea   NEUROLOGICAL: No headache or numbness, no tremors  MUSCULOSKELETAL: No joint pain, no muscle pain  GENITOURINARY: no dysuria, no frequency, no hesitancy  PSYCHIATRY: no depression , no anxiety  ALL OTHER  ROS negative      ================VITALS SIGNS=====================  Vital Signs Last 24 Hrs  T(C): 36.5 (16 Oct 2018 05:02), Max: 37 (15 Oct 2018 14:19)  T(F): 97.7 (16 Oct 2018 05:02), Max: 98.6 (15 Oct 2018 14:19)  HR: 77 (16 Oct 2018 05:02) (77 - 86)  BP: 117/71 (16 Oct 2018 05:02) (98/66 - 117/71)  BP(mean): --  RR: 16 (16 Oct 2018 05:02) (16 - 16)  SpO2: 100% (16 Oct 2018 05:02) (100% - 100%)    ===============PHYSICAL EXAM====================    GENERAL: NAD  HEENT: Normocephalic;  conjunctivae and sclerae clear; moist mucous membranes;   NECK : supple  CHEST/LUNG: Clear to auscultation bilaterally with good air entry   HEART: S1 S2  regular; no murmurs, gallops or rubs  ABDOMEN: Soft, Nontender, Nondistended; Bowel sounds present  EXTREMITIES: no cyanosis; no edema; no calf tenderness  SKIN: warm and dry; no rash  NERVOUS SYSTEM:  Awake and alert; Oriented  to place, person and time    ==============LABORATORIES======================  LABS:                        10.5   7.9   )-----------( 477      ( 15 Oct 2018 08:07 )             33.8     10-15    140  |  105  |  12  ----------------------------<  90  3.9   |  27  |  0.60    Ca    9.2      15 Oct 2018 08:07          CAPILLARY BLOOD GLUCOSE          =============INPUTS/OUPUTS=====================        RADIOLOGY & ADDITIONAL TESTS:    Imaging Personally Reviewed:  YES    Consultant(s) Notes Reviewed:   YES    Care Discussed with Consultants : YES    Plan of care was discussed with patient  and /or primary care giver; all questions and concerns were addressed and care was aligned with patient's wishes. Time was allowed for questions that were answered to the best of my abilities

## 2018-10-16 NOTE — PROGRESS NOTE ADULT - PROBLEM SELECTOR PLAN 5
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
IMPROVE VTE Individual Risk Assessment          RISK                                                          Points  [  ] Previous VTE                                                3  [  ] Thrombophilia                                             2  [  ] Lower limb paralysis                                   2        (unable to hold up >15 seconds)    [  ] Current Cancer                                             2         (within 6 months)  [x  ] Immobilization > 24 hrs                              1  [  ] ICU/CCU stay > 24 hours                             1  [ x ] Age > 60                                                         1    IMPROVE VTE Score: vte score 2   hold prophylaxis due to GI bleed   SCD for now
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
Patient has h/o hemophilia and labs consistent w/ factor XI deficiency
IMPROVE VTE Individual Risk Assessment          RISK                                                          Points  [  ] Previous VTE                                                3  [  ] Thrombophilia                                             2  [  ] Lower limb paralysis                                   2        (unable to hold up >15 seconds)    [  ] Current Cancer                                             2         (within 6 months)  [x  ] Immobilization > 24 hrs                              1  [  ] ICU/CCU stay > 24 hours                             1  [ x ] Age > 60                                                         1    IMPROVE VTE Score: vte score 2   hold prophylaxis due to GI bleed   SCD for now

## 2018-10-16 NOTE — PROGRESS NOTE ADULT - ASSESSMENT
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 As per patient she is having multiple episodes of watery diarrhea ( 10 - 12 episodes in 24 hrs) mixed with Bright red blood. Patient have intermitted episodes of Diarrhea since 1 week went to see her PCP underwent some test but no significant improvement in Diarrhea. Diarrhea is watery, foul smelling associated with Abdominal pain, fever, chills, nausea and NBNB vomiting. Patient recently travelled to [place], admitted for 10 days for bloody Diarrhea, underwent EGD, Colonoscopy and capsule Endoscopy, found to have ulcers in small intestine. Patient was discharged on oral flagyl, she took for 10 days and diarrhea resolved. [place] to back [place] and having diarrhea again. Patient denies chest pain, sob, dysuria, skin rashes, or dysuria.     On admission patient is low grade fever Tmax 99.8, tachycardiac, , /65, CBC 23K, HB 10.5, . lactate normal   CT showed pancoliitis   Cdiff pos  Mod AFB in stool- awaiting AFB gene probe

## 2018-10-16 NOTE — PROGRESS NOTE ADULT - SUBJECTIVE AND OBJECTIVE BOX
Patient is seen and examined at the bed side, is afebrile.  No new events. The final work up of stool AFB still  pending.       REVIEW OF SYSTEMS: All other review systems are negative      ALLERGIES: NKDA      Vital Signs Last 24 Hrs  T(C): 36.7 (16 Oct 2018 14:11), Max: 36.7 (16 Oct 2018 14:11)  T(F): 98.1 (16 Oct 2018 14:11), Max: 98.1 (16 Oct 2018 14:11)  HR: 93 (16 Oct 2018 14:11) (77 - 93)  BP: 105/57 (16 Oct 2018 14:11) (105/57 - 117/71)  BP(mean): --  RR: 16 (16 Oct 2018 14:11) (16 - 16)  SpO2: 98% (16 Oct 2018 14:11) (98% - 100%)      PHYSICAL EXAM:  GENERAL: Not in distress  CHEST/LUNG: Air entry bilaterally   HEART: s1 and s2 present  ABDOMEN: Nontender, and Nondistended  EXTREMITIES:  No pedal edema  CNS: AAOX3        LABS: No new Labs                        10.5   7.9   )-----------( 477      ( 15 Oct 2018 08:07 )             33.8                           9.8    6.8   )-----------( 476      ( 14 Oct 2018 07:00 )             31.9                9.1    19.1  )-----------( 294      ( 07 Oct 2018 07:20 )             28.4         10-15    140  |  105  |  12  ----------------------------<  90  3.9   |  27  |  0.60    Ca    9.2      15 Oct 2018 08:07    10-14    139  |  105  |  12  ----------------------------<  87  4.0   |  27  |  0.64    Ca    9.2      14 Oct 2018 07    TPro  6.1  /  Alb  2.6<L>  /  TBili  0.4  /  DBili  x   /  AST  11  /  ALT  13  /  AlkPhos  45  10-07        MEDICATIONS  (STANDING):  cholecalciferol 1000 Unit(s) Oral daily  influenza   Vaccine 0.5 milliLiter(s) IntraMuscular once  lactobacillus acidophilus 1 Tablet(s) Oral every 8 hours  vancomycin    Solution 125 milliGRAM(s) Oral every 6 hours    MEDICATIONS  (PRN):  acetaminophen   Tablet .. 650 milliGRAM(s) Oral every 6 hours PRN Temp greater or equal to 38C (100.4F)  acetaminophen  IVPB .. 1000 milliGRAM(s) IV Intermittent once PRN Mild Pain (1 - 3)  ondansetron Injectable 4 milliGRAM(s) IV Push every 8 hours PRN Nausea and/or Vomiting        RADIOLOGY & ADDITIONAL TESTS:    10/6/18: CT Abdomen and Pelvis w/ IV Cont (10.06.18 @ 23:53) Pelvic nodes versus cystic neoplasms. Diffuse colitis,  Thickening of the colon, primarily involving the descending and descending colons, down to the rectum.        MICROBIOLOGY DATA:      HIV-1/2 Antigen/Antibody Screen by CMIA (10.10.18 @ 18:31)    HIV-1/2 Combo Result: Nonreact: The HIV Ag/Ab Combo test performed screens for HIV-1 p24 antigen,  antibodies to HIV-1 (group M and group O), and antibodies to HIV-2. All  specimens repeatedly reactive will reflex to an HIV 1/2 antibody  confirmation and differentiation test. This assay detects p24 antigen  which may be present prior to the development of HIV antibodies,  therefore a reactive result with a negative HIV 1/2 AB Confirmation  should be followed up with HIV-1 RNA, HIV-2 RNA and repeat testing in 4-8  weeks. A nonreactive result does not preclude previous exposure to or  infection with HIV-1 or HIV-2. [facility] prohibits disclosure of this  result to any unauthorized party.        Culture - Acid Fast - Stool w/Smear . (10.07.18 @ 16:43)    Specimen Source: .Stool Stool    Acid Fast Bacilli Smear:   Moderate Acid fast bacillus seen by fluorochrome stain.      Clostridium difficile Toxin by PCR (10.07.18 @ 11:21)    Clostridium difficile Toxin by PCR: The results of this test should be interpreted with consideration of all  clinical and laboratory findings. This test determines the presence of  the C. difficile tcdB gene at a given time and is not intended to  identify antibiotic associated disease or C. difficile infection without  clinical context. Successful treatment is based on the resolution of  clinical symptoms. This test should not be used as a "test of cure"  because C. difficile DNA will persist after successful treatment. Repeat  testing will not be permitted.    This test is performed on the BD MAX system using Real-Time PCR and  fluorogenic target-specific hybridization.    C Diff by PCR Result: Detected

## 2018-10-16 NOTE — PROGRESS NOTE ADULT - SUBJECTIVE AND OBJECTIVE BOX
Patient denies CP, SOB Review of systems otherwise (-)    acetaminophen   Tablet .. 650 milliGRAM(s) Oral every 6 hours PRN  acetaminophen  IVPB .. 1000 milliGRAM(s) IV Intermittent once PRN  cholecalciferol 1000 Unit(s) Oral daily  influenza   Vaccine 0.5 milliLiter(s) IntraMuscular once  lactobacillus acidophilus 1 Tablet(s) Oral every 8 hours  ondansetron Injectable 4 milliGRAM(s) IV Push every 8 hours PRN  vancomycin    Solution 125 milliGRAM(s) Oral every 6 hours                            10.5   7.9   )-----------( 477      ( 15 Oct 2018 08:07 )             33.8       Hemoglobin: 10.5 g/dL (10-15 @ 08:07)  Hemoglobin: 9.8 g/dL (10-14 @ 07:00)  Hemoglobin: 10.0 g/dL (10-13 @ 06:40)  Hemoglobin: 9.9 g/dL (10-12 @ 08:43)      10-15    140  |  105  |  12  ----------------------------<  90  3.9   |  27  |  0.60    Ca    9.2      15 Oct 2018 08:07      Creatinine Trend: 0.60<--, 0.64<--, 0.63<--, 0.60<--, 0.44<--, 0.43<--    COAGS:           T(C): 36.5 (10-16-18 @ 05:02), Max: 37 (10-15-18 @ 14:19)  HR: 77 (10-16-18 @ 05:02) (77 - 86)  BP: 117/71 (10-16-18 @ 05:02) (98/66 - 117/71)  RR: 16 (10-16-18 @ 05:02) (16 - 16)  SpO2: 100% (10-16-18 @ 05:02) (100% - 100%)  Wt(kg): --    I&O's Summary    Appears well in NAD  HEENT:  (-)icterus (-)pallor  CV: N S1 S2 1/6 NANCI (+)2 Pulses B/l  Resp:  Clear to ausculatation B/L, normal effort  GI: (+) BS Soft, NT, ND  Lymph:  (-)Edema, (-)obvious lymphadenopathy  Skin: Warm to touch, Normal turgor  Psych: Appropriate mood and affect        ASSESSMENT/PLAN: 	78y  Female rom home with PMHx of  diverticulosis,  PUD,  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 As per patient she is having multiple episodes of watery diarrhea  and hypotension normal LV fx    - Echo without pertinent findings  - no clinical heart failure or anginal symptoms   - No need for further inpatient cardiac work up.  - GI and ID f/u    Alen Kothari MD, FACC

## 2018-10-16 NOTE — PROGRESS NOTE ADULT - PROBLEM SELECTOR PLAN 1
No further episodes of diarrhea; no abdominal tenderness w/ palpation  -CT abd showed Diffuse pancolonic wall thickening from the cecum to the rectum   consistent with colitis which may be infectious or inflammatory.  - C/w isolation precautions, PO vancomycin 125 mg QID per ID until 10/21   - ID Dr Altamirano  - GI Dr Andrade

## 2018-10-16 NOTE — PROGRESS NOTE ADULT - ATTENDING COMMENTS
Agree with above assessment and plan as outlined above.    - clinically improving  - F/u echo    Alen Kothari MD, FACC
pt seen and examined at bedside, with daughter at bedside  had 2 episodes soft/liquidy stool overnight  pt and family very upset and frustrated regarding forced to sign a 24 hour d/c notice, despite having persistent abnormal s/s  labs (+) cdiff pcr and afb in stool   on po vanco, additional abx/tx per id/gi appreciated  hypophosphatemia replaced  isolation precautions
I agree with the above info, changes made above as needed  pt seen and evaluated with  at bedside at pt's request  clinically improving, soft bowel movements  awaiting afb final results, will adjust abx per id recs
I agree with the above information, changes made above as needed, pt clinically improving, cxr (+) atelectasis. Awaiting afb final results. Abx per ID. Incentive spirometry.
I agree with the above information, pt seen and evaluated at bedside  case discussed with medical staff  clinically pt improving, labs/vitals intact  awaiting afb results, id will rec final abx  case management
I agree with the above information, pt seen and evaluated at bedside, with  at bedside at the request of the pt.   case discussed with medical staff  clinically pt improving, labs/vitals intact  awaiting afb final results, id will rec final abx and management  case management for d/c planning home
pt seen and examined at bedside, with pt's  at bedside. all questions/concerns answered accordingly.  clinically improving, with 1 soft bowel movement today. labs and lytes intact. awaiting afb final results. po vanco x 14 days total. additional abx per ID.  All medical consultants management appreciated
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 F/u CXR. F/u final lab results. C/w oral abx.  Will adjust abx pending labs and ID recs.  ?Pulm Isolation precautions.

## 2018-10-16 NOTE — PROGRESS NOTE ADULT - PROBLEM SELECTOR PLAN 6
IMPROVE VTE Individual Risk Assessment          RISK                                                          Points  [  ] Previous VTE                                                3  [  ] Thrombophilia                                             2  [  ] Lower limb paralysis                                   2        (unable to hold up >15 seconds)    [  ] Current Cancer                                             2         (within 6 months)  [x  ] Immobilization > 24 hrs                              1  [  ] ICU/CCU stay > 24 hours                             1  [ x ] Age > 60                                                         1    IMPROVE VTE Score: vte score 2   Lovenox sub q for DVT prophy
IMPROVE VTE Individual Risk Assessment          RISK                                                          Points  [  ] Previous VTE                                                3  [  ] Thrombophilia                                             2  [  ] Lower limb paralysis                                   2        (unable to hold up >15 seconds)    [  ] Current Cancer                                             2         (within 6 months)  [x  ] Immobilization > 24 hrs                              1  [  ] ICU/CCU stay > 24 hours                             1  [ x ] Age > 60                                                         1    IMPROVE VTE Score: vte score 2   hold prophylaxis due to GI bleed   SCD for now
IMPROVE VTE Individual Risk Assessment          RISK                                                          Points  [  ] Previous VTE                                                3  [  ] Thrombophilia                                             2  [  ] Lower limb paralysis                                   2        (unable to hold up >15 seconds)    [  ] Current Cancer                                             2         (within 6 months)  [x  ] Immobilization > 24 hrs                              1  [  ] ICU/CCU stay > 24 hours                             1  [ x ] Age > 60                                                         1    IMPROVE VTE Score: vte score 2   hold prophylaxis due to GI bleed   SCD for now
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

## 2018-10-16 NOTE — PROGRESS NOTE ADULT - PROBLEM SELECTOR PROBLEM 5
Hemophilia
Prophylactic measure
Hemophilia
Prophylactic measure

## 2018-10-16 NOTE — PROGRESS NOTE ADULT - ASSESSMENT
Patient is a 78y old  Female who presents with a chief complaint of Diarrhea which  is watery, foul smelling associated with Abdominal pain, fever, chills, nausea and NBNB vomiting. Patient recently travelled to [place], admitted for 10 days in  [facility] for bloody Diarrhea, underwent EGD, Colonoscopy and capsule Endoscopy, found to have ulcers in small intestine. Patient was discharged on oral flagyl, she took for 10 days and diarrhea resolved. patient came to back to [place] about 3 [place]  ago and having diarrhea again. In the ER, she found to have low grade fever, tachycardia, Leukocytosis and diffuse colitis on CT abd/pelvis. The stool for C.difficlie toxin PCR is positive. She has started on oral Vancomycin and ID flagyl. The ID consult  requested to assist with further evaluation and antibiotic  management.       # Diffuse colitis  # C.difficile colitis  # AFB positive in stool    would recommend:    1. OOB to chair   2. Follow up final AFB gene probe, not finalized yet  3. Continue oral vancomycin  dose to 125 mg q 6hours and continue until 10/21/18   4. Patient can be discharge on Oral vancomycin and AFB result can be followed as a outpatient       d/w patient and house staff

## 2018-10-17 VITALS
SYSTOLIC BLOOD PRESSURE: 116 MMHG | TEMPERATURE: 98 F | RESPIRATION RATE: 17 BRPM | HEART RATE: 91 BPM | DIASTOLIC BLOOD PRESSURE: 61 MMHG | OXYGEN SATURATION: 99 %

## 2018-10-17 RX ORDER — VANCOMYCIN HCL 1 G
125 VIAL (EA) INTRAVENOUS
Qty: 125 | Refills: 0 | OUTPATIENT
Start: 2018-10-17 | End: 2018-10-21

## 2018-10-17 RX ORDER — LACTOBACILLUS ACIDOPHILUS 100MM CELL
1 CAPSULE ORAL
Qty: 30 | Refills: 0 | OUTPATIENT
Start: 2018-10-17 | End: 2018-11-15

## 2018-10-17 RX ORDER — VANCOMYCIN HCL 1 G
3 VIAL (EA) INTRAVENOUS
Qty: 3 | Refills: 0
Start: 2018-10-17 | End: 2018-10-21

## 2018-10-17 RX ORDER — VANCOMYCIN HCL 1 G
125 VIAL (EA) INTRAVENOUS
Qty: 1 | Refills: 0 | OUTPATIENT
Start: 2018-10-17 | End: 2018-10-21

## 2018-10-17 RX ORDER — LACTOBACILLUS ACIDOPHILUS 100MM CELL
1 CAPSULE ORAL
Qty: 30 | Refills: 0
Start: 2018-10-17 | End: 2018-11-15

## 2018-10-17 RX ADMIN — INFLUENZA VIRUS VACCINE 0.5 MILLILITER(S): 15; 15; 15; 15 SUSPENSION INTRAMUSCULAR at 11:01

## 2018-10-17 RX ADMIN — Medication 125 MILLIGRAM(S): at 06:40

## 2018-10-17 RX ADMIN — Medication 125 MILLIGRAM(S): at 01:49

## 2018-10-17 RX ADMIN — Medication 125 MILLIGRAM(S): at 11:01

## 2018-10-17 RX ADMIN — Medication 1 TABLET(S): at 06:40

## 2018-10-17 RX ADMIN — Medication 1000 UNIT(S): at 11:01

## 2018-10-17 NOTE — PROGRESS NOTE ADULT - PROVIDER SPECIALTY LIST ADULT
Cardiology
Infectious Disease
Internal Medicine
Cardiology
Infectious Disease
Infectious Disease
Internal Medicine

## 2018-10-17 NOTE — PROGRESS NOTE ADULT - REASON FOR ADMISSION
Diarrhea

## 2018-10-17 NOTE — PROGRESS NOTE ADULT - SUBJECTIVE AND OBJECTIVE BOX
Patient denies CP, SOB Review of systems otherwise (-)    acetaminophen   Tablet .. 650 milliGRAM(s) Oral every 6 hours PRN  acetaminophen  IVPB .. 1000 milliGRAM(s) IV Intermittent once PRN  cholecalciferol 1000 Unit(s) Oral daily  lactobacillus acidophilus 1 Tablet(s) Oral every 8 hours  ondansetron Injectable 4 milliGRAM(s) IV Push every 8 hours PRN  vancomycin    Solution 125 milliGRAM(s) Oral every 6 hours          Hemoglobin: 10.5 g/dL (10-15 @ 08:07)  Hemoglobin: 9.8 g/dL (10-14 @ 07:00)  Hemoglobin: 10.0 g/dL (10-13 @ 06:40)            Creatinine Trend: 0.60<--, 0.64<--, 0.63<--, 0.60<--, 0.44<--, 0.43<--    COAGS:           T(C): 37.1 (10-17-18 @ 05:16), Max: 37.1 (10-17-18 @ 05:16)  HR: 82 (10-17-18 @ 05:16) (82 - 93)  BP: 127/61 (10-17-18 @ 05:16) (105/57 - 127/61)  RR: 16 (10-17-18 @ 05:16) (16 - 16)  SpO2: 99% (10-17-18 @ 05:16) (98% - 99%)  Wt(kg): --    I&O's Summary      Appears well in NAD  HEENT:  (-)icterus (-)pallor  CV: N S1 S2 1/6 NANCI (+)2 Pulses B/l  Resp:  Clear to ausculatation B/L, normal effort  GI: (+) BS Soft, NT, ND  Lymph:  (-)Edema, (-)obvious lymphadenopathy  Skin: Warm to touch, Normal turgor  Psych: Appropriate mood and affect        ASSESSMENT/PLAN: 	78y  Female rom home with PMHx of  diverticulosis,  PUD,  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 As per patient she is having multiple episodes of watery diarrhea  and hypotension normal LV fx    - Echo without pertinent findings  - no clinical heart failure or anginal symptoms   - No need for further inpatient cardiac work up.  - GI and ID f/u   -D/C planning per primary team    Alen Kothari MD, FACC

## 2018-11-11 PROCEDURE — 93306 TTE W/DOPPLER COMPLETE: CPT

## 2018-11-11 PROCEDURE — 85240 CLOT FACTOR VIII AHG 1 STAGE: CPT

## 2018-11-11 PROCEDURE — 80061 LIPID PANEL: CPT

## 2018-11-11 PROCEDURE — 83605 ASSAY OF LACTIC ACID: CPT

## 2018-11-11 PROCEDURE — 87177 OVA AND PARASITES SMEARS: CPT

## 2018-11-11 PROCEDURE — 87493 C DIFF AMPLIFIED PROBE: CPT

## 2018-11-11 PROCEDURE — 87507 IADNA-DNA/RNA PROBE TQ 12-25: CPT

## 2018-11-11 PROCEDURE — 71045 X-RAY EXAM CHEST 1 VIEW: CPT

## 2018-11-11 PROCEDURE — 82306 VITAMIN D 25 HYDROXY: CPT

## 2018-11-11 PROCEDURE — 87045 FECES CULTURE AEROBIC BACT: CPT

## 2018-11-11 PROCEDURE — 86850 RBC ANTIBODY SCREEN: CPT

## 2018-11-11 PROCEDURE — 87015 SPECIMEN INFECT AGNT CONCNTJ: CPT

## 2018-11-11 PROCEDURE — 74018 RADEX ABDOMEN 1 VIEW: CPT

## 2018-11-11 PROCEDURE — 83735 ASSAY OF MAGNESIUM: CPT

## 2018-11-11 PROCEDURE — 85045 AUTOMATED RETICULOCYTE COUNT: CPT

## 2018-11-11 PROCEDURE — 82728 ASSAY OF FERRITIN: CPT

## 2018-11-11 PROCEDURE — 86901 BLOOD TYPING SEROLOGIC RH(D): CPT

## 2018-11-11 PROCEDURE — 87389 HIV-1 AG W/HIV-1&-2 AB AG IA: CPT

## 2018-11-11 PROCEDURE — 83690 ASSAY OF LIPASE: CPT

## 2018-11-11 PROCEDURE — 87205 SMEAR GRAM STAIN: CPT

## 2018-11-11 PROCEDURE — 87116 MYCOBACTERIA CULTURE: CPT

## 2018-11-11 PROCEDURE — 87046 STOOL CULTR AEROBIC BACT EA: CPT

## 2018-11-11 PROCEDURE — 82941 ASSAY OF GASTRIN: CPT

## 2018-11-11 PROCEDURE — 87206 SMEAR FLUORESCENT/ACID STAI: CPT

## 2018-11-11 PROCEDURE — 80053 COMPREHEN METABOLIC PANEL: CPT

## 2018-11-11 PROCEDURE — 90686 IIV4 VACC NO PRSV 0.5 ML IM: CPT

## 2018-11-11 PROCEDURE — 96374 THER/PROPH/DIAG INJ IV PUSH: CPT | Mod: XU

## 2018-11-11 PROCEDURE — 74177 CT ABD & PELVIS W/CONTRAST: CPT

## 2018-11-11 PROCEDURE — 83550 IRON BINDING TEST: CPT

## 2018-11-11 PROCEDURE — 83615 LACTATE (LD) (LDH) ENZYME: CPT

## 2018-11-11 PROCEDURE — 82272 OCCULT BLD FECES 1-3 TESTS: CPT

## 2018-11-11 PROCEDURE — 83036 HEMOGLOBIN GLYCOSYLATED A1C: CPT

## 2018-11-11 PROCEDURE — 80076 HEPATIC FUNCTION PANEL: CPT

## 2018-11-11 PROCEDURE — 82746 ASSAY OF FOLIC ACID SERUM: CPT

## 2018-11-11 PROCEDURE — 84100 ASSAY OF PHOSPHORUS: CPT

## 2018-11-11 PROCEDURE — 85270 CLOT FACTOR XI PTA: CPT

## 2018-11-11 PROCEDURE — 99285 EMERGENCY DEPT VISIT HI MDM: CPT | Mod: 25

## 2018-11-11 PROCEDURE — 85250 CLOT FACTOR IX PTC/CHRSTMAS: CPT

## 2018-11-11 PROCEDURE — 80048 BASIC METABOLIC PNL TOTAL CA: CPT

## 2018-11-11 PROCEDURE — 85027 COMPLETE CBC AUTOMATED: CPT

## 2018-11-11 PROCEDURE — 87040 BLOOD CULTURE FOR BACTERIA: CPT

## 2018-11-11 PROCEDURE — 86900 BLOOD TYPING SEROLOGIC ABO: CPT

## 2018-11-11 PROCEDURE — 82607 VITAMIN B-12: CPT

## 2018-11-11 PROCEDURE — 83010 ASSAY OF HAPTOGLOBIN QUANT: CPT

## 2018-12-24 LAB
CULTURE RESULTS: SIGNIFICANT CHANGE UP
SPECIMEN SOURCE: SIGNIFICANT CHANGE UP

## 2018-12-29 ENCOUNTER — EMERGENCY (EMERGENCY)
Facility: HOSPITAL | Age: 78
LOS: 1 days | Discharge: HOME | End: 2018-12-29
Attending: EMERGENCY MEDICINE
Payer: COMMERCIAL

## 2018-12-29 VITALS
TEMPERATURE: 98 F | HEART RATE: 84 BPM | RESPIRATION RATE: 18 BRPM | DIASTOLIC BLOOD PRESSURE: 71 MMHG | OXYGEN SATURATION: 99 % | SYSTOLIC BLOOD PRESSURE: 128 MMHG

## 2018-12-29 VITALS
DIASTOLIC BLOOD PRESSURE: 79 MMHG | HEART RATE: 95 BPM | TEMPERATURE: 98 F | RESPIRATION RATE: 20 BRPM | SYSTOLIC BLOOD PRESSURE: 146 MMHG | OXYGEN SATURATION: 100 %

## 2018-12-29 DIAGNOSIS — Z98.49 CATARACT EXTRACTION STATUS, UNSPECIFIED EYE: Chronic | ICD-10-CM

## 2018-12-29 DIAGNOSIS — D49.4 NEOPLASM OF UNSPECIFIED BEHAVIOR OF BLADDER: Chronic | ICD-10-CM

## 2018-12-29 DIAGNOSIS — Z90.49 ACQUIRED ABSENCE OF OTHER SPECIFIED PARTS OF DIGESTIVE TRACT: Chronic | ICD-10-CM

## 2018-12-29 DIAGNOSIS — Z98.890 OTHER SPECIFIED POSTPROCEDURAL STATES: Chronic | ICD-10-CM

## 2018-12-29 LAB
ALBUMIN SERPL ELPH-MCNC: 3.7 G/DL — SIGNIFICANT CHANGE UP (ref 3.5–5)
ALP SERPL-CCNC: 70 U/L — SIGNIFICANT CHANGE UP (ref 40–120)
ALT FLD-CCNC: 15 U/L DA — SIGNIFICANT CHANGE UP (ref 10–60)
ANION GAP SERPL CALC-SCNC: 10 MMOL/L — SIGNIFICANT CHANGE UP (ref 5–17)
APTT BLD: 39.7 SEC — HIGH (ref 27.5–36.3)
AST SERPL-CCNC: 11 U/L — SIGNIFICANT CHANGE UP (ref 10–40)
BILIRUB SERPL-MCNC: 0.5 MG/DL — SIGNIFICANT CHANGE UP (ref 0.2–1.2)
BUN SERPL-MCNC: 12 MG/DL — SIGNIFICANT CHANGE UP (ref 7–18)
CALCIUM SERPL-MCNC: 9.5 MG/DL — SIGNIFICANT CHANGE UP (ref 8.4–10.5)
CHLORIDE SERPL-SCNC: 103 MMOL/L — SIGNIFICANT CHANGE UP (ref 96–108)
CO2 SERPL-SCNC: 25 MMOL/L — SIGNIFICANT CHANGE UP (ref 22–31)
CREAT SERPL-MCNC: 0.64 MG/DL — SIGNIFICANT CHANGE UP (ref 0.5–1.3)
GLUCOSE SERPL-MCNC: 103 MG/DL — HIGH (ref 70–99)
HCT VFR BLD CALC: 38.9 % — SIGNIFICANT CHANGE UP (ref 34.5–45)
HGB BLD-MCNC: 12.3 G/DL — SIGNIFICANT CHANGE UP (ref 11.5–15.5)
INR BLD: 1.06 RATIO — SIGNIFICANT CHANGE UP (ref 0.88–1.16)
MCHC RBC-ENTMCNC: 29 PG — SIGNIFICANT CHANGE UP (ref 27–34)
MCHC RBC-ENTMCNC: 31.7 GM/DL — LOW (ref 32–36)
MCV RBC AUTO: 91.6 FL — SIGNIFICANT CHANGE UP (ref 80–100)
NT-PROBNP SERPL-SCNC: 143 PG/ML — SIGNIFICANT CHANGE UP (ref 0–450)
PLATELET # BLD AUTO: 276 K/UL — SIGNIFICANT CHANGE UP (ref 150–400)
POTASSIUM SERPL-MCNC: 3.8 MMOL/L — SIGNIFICANT CHANGE UP (ref 3.5–5.3)
POTASSIUM SERPL-SCNC: 3.8 MMOL/L — SIGNIFICANT CHANGE UP (ref 3.5–5.3)
PROT SERPL-MCNC: 7.6 G/DL — SIGNIFICANT CHANGE UP (ref 6–8.3)
PROTHROM AB SERPL-ACNC: 11.8 SEC — SIGNIFICANT CHANGE UP (ref 10–12.9)
RBC # BLD: 4.25 M/UL — SIGNIFICANT CHANGE UP (ref 3.8–5.2)
RBC # FLD: 16.4 % — HIGH (ref 10.3–14.5)
SODIUM SERPL-SCNC: 138 MMOL/L — SIGNIFICANT CHANGE UP (ref 135–145)
TROPONIN I SERPL-MCNC: <0.015 NG/ML — SIGNIFICANT CHANGE UP (ref 0–0.04)
TROPONIN I SERPL-MCNC: <0.015 NG/ML — SIGNIFICANT CHANGE UP (ref 0–0.04)
WBC # BLD: 9.6 K/UL — SIGNIFICANT CHANGE UP (ref 3.8–10.5)
WBC # FLD AUTO: 9.6 K/UL — SIGNIFICANT CHANGE UP (ref 3.8–10.5)

## 2018-12-29 PROCEDURE — 80053 COMPREHEN METABOLIC PANEL: CPT

## 2018-12-29 PROCEDURE — 71045 X-RAY EXAM CHEST 1 VIEW: CPT | Mod: 26

## 2018-12-29 PROCEDURE — 85610 PROTHROMBIN TIME: CPT

## 2018-12-29 PROCEDURE — 93005 ELECTROCARDIOGRAM TRACING: CPT

## 2018-12-29 PROCEDURE — 99284 EMERGENCY DEPT VISIT MOD MDM: CPT

## 2018-12-29 PROCEDURE — 99284 EMERGENCY DEPT VISIT MOD MDM: CPT | Mod: 25

## 2018-12-29 PROCEDURE — 85730 THROMBOPLASTIN TIME PARTIAL: CPT

## 2018-12-29 PROCEDURE — 83880 ASSAY OF NATRIURETIC PEPTIDE: CPT

## 2018-12-29 PROCEDURE — 85027 COMPLETE CBC AUTOMATED: CPT

## 2018-12-29 PROCEDURE — 36415 COLL VENOUS BLD VENIPUNCTURE: CPT

## 2018-12-29 PROCEDURE — 71045 X-RAY EXAM CHEST 1 VIEW: CPT

## 2018-12-29 PROCEDURE — 84484 ASSAY OF TROPONIN QUANT: CPT

## 2018-12-29 NOTE — ED PROVIDER NOTE - OBJECTIVE STATEMENT
77 y/o F patient with a significant PMHx of GERD, Kidney stone and significant PSHx of cholecystectomy presents to the ED with c/o chest pressure and shortness of breath since yesterday. Patient states that last week, patient had a cold and cough. Patient reports dizziness when getting up and feeling nauseous eating. Patient states never having a blood clot but, had a ulcer in the legs. Patient denies any fever, chills, urinary problems, leg swelling, cardiac problems, respiratory problems, or any other complains. Non-smoker. 79 y/o F patient with a significant PMHx of GERD, Kidney stone and significant PSHx of cholecystectomy presents to the ED with c/o chest pressure and shortness of breath since yesterday. Patient states that last week, patient had a cold and cough. Patient reports dizziness when getting up and feeling nauseous eating. Patient denies hx of blood clots but, had a ulcer in the legs. Patient denies any fever, chills, urinary problems, leg swelling, cardiac problems, respiratory problems, or any other complains. Non-smoker.

## 2018-12-29 NOTE — ED PROVIDER NOTE - MEDICAL DECISION MAKING DETAILS
Patient with chest discomfort, shortness of breath, and dizziness. Patient is currently asymptomatic. Will do labs, EKG, and reassess.

## 2018-12-29 NOTE — ED ADULT NURSE NOTE - OBJECTIVE STATEMENT
Patient A&Ox3, c/o of difficulty breathing, chest tightness and nausea since yesterday. Patient placed on NC for comfort. No acute distress noted, Denies chest pain. Safety maintained. Patient A&Ox3, c/o of difficulty breathing, chest tightness and nausea since yesterday. Patient placed on cardiac monitor and placed on NC for comfort. No acute distress noted, Denies chest pain. Safety maintained.

## 2018-12-29 NOTE — ED PROVIDER NOTE - PROGRESS NOTE DETAILS
Pt asymptomatic on re-eval.  Troponin negative x 2, EKG with PVC but no signs of ischemia.  Advised strict return precautions and PMD and cardiology f/u.

## 2018-12-29 NOTE — ED PROVIDER NOTE - CONDUCTED A DETAILED DISCUSSION WITH PATIENT AND/OR GUARDIAN REGARDING, MDM
lab results lab results/need for outpatient follow-up/radiology results/return to ED if symptoms worsen, persist or questions arise

## 2019-07-10 NOTE — DISCHARGE NOTE ADULT - SECONDARY DIAGNOSIS.
[As Noted in HPI] : as noted in HPI [Negative] : Heme/Lymph GERD (gastroesophageal reflux disease) Fibromyalgia

## 2019-07-30 NOTE — ED ADULT NURSE NOTE - NS ED PATIENT SAFETY CONCERN
Dapsone Counseling: I discussed with the patient the risks of dapsone including but not limited to hemolytic anemia, agranulocytosis, rashes, methemoglobinemia, kidney failure, peripheral neuropathy, headaches, GI upset, and liver toxicity.  Patients who start dapsone require monitoring including baseline LFTs and weekly CBCs for the first month, then every month thereafter.  The patient verbalized understanding of the proper use and possible adverse effects of dapsone.  All of the patient's questions and concerns were addressed. Erythromycin Counseling:  I discussed with the patient the risks of erythromycin including but not limited to GI upset, allergic reaction, drug rash, diarrhea, increase in liver enzymes, and yeast infections. Tetracycline Counseling: Patient counseled regarding possible photosensitivity and increased risk for sunburn.  Patient instructed to avoid sunlight, if possible.  When exposed to sunlight, patients should wear protective clothing, sunglasses, and sunscreen.  The patient was instructed to call the office immediately if the following severe adverse effects occur:  hearing changes, easy bruising/bleeding, severe headache, or vision changes.  The patient verbalized understanding of the proper use and possible adverse effects of tetracycline.  All of the patient's questions and concerns were addressed. Patient understands to avoid pregnancy while on therapy due to potential birth defects. Isotretinoin Pregnancy And Lactation Text: This medication is Pregnancy Category X and is considered extremely dangerous during pregnancy. It is unknown if it is excreted in breast milk. Tazorac Counseling:  Patient advised that medication is irritating and drying.  Patient may need to apply sparingly and wash off after an hour before eventually leaving it on overnight.  The patient verbalized understanding of the proper use and possible adverse effects of tazorac.  All of the patient's questions and concerns were addressed. Topical Sulfur Applications Pregnancy And Lactation Text: This medication is Pregnancy Category C and has an unknown safety profile during pregnancy. It is unknown if this topical medication is excreted in breast milk. Spironolactone Counseling: Patient advised regarding risks of diarrhea, abdominal pain, hyperkalemia, birth defects (for female patients), liver toxicity and renal toxicity. The patient may need blood work to monitor liver and kidney function and potassium levels while on therapy. The patient verbalized understanding of the proper use and possible adverse effects of spironolactone.  All of the patient's questions and concerns were addressed. Medical Necessity Information: LCD Guidelines vary from payer to payer. Please check with your payer's policy to determine medical necessity. High Dose Vitamin A Pregnancy And Lactation Text: High dose vitamin A therapy is contraindicated during pregnancy and breast feeding. Tetracycline Pregnancy And Lactation Text: This medication is Pregnancy Category D and not consider safe during pregnancy. It is also excreted in breast milk. Detail Level: Zone Birth Control Pills Pregnancy And Lactation Text: This medication should be avoided if pregnant and for the first 30 days post-partum. Topical Clindamycin Pregnancy And Lactation Text: This medication is Pregnancy Category B and is considered safe during pregnancy. It is unknown if it is excreted in breast milk. Topical Clindamycin Counseling: Patient counseled that this medication may cause skin irritation or allergic reactions.  In the event of skin irritation, the patient was advised to reduce the amount of the drug applied or use it less frequently.   The patient verbalized understanding of the proper use and possible adverse effects of clindamycin.  All of the patient's questions and concerns were addressed. Benzoyl Peroxide Pregnancy And Lactation Text: This medication is Pregnancy Category C. It is unknown if benzoyl peroxide is excreted in breast milk. Isotretinoin Counseling: Patient should get monthly blood tests, not donate blood, not drive at night if vision affected, not share medication, and not undergo elective surgery for 6 months after tx completed. Side effects reviewed, pt to contact office should one occur. Erythromycin Pregnancy And Lactation Text: This medication is Pregnancy Category B and is considered safe during pregnancy. It is also excreted in breast milk. Medical Necessity Clause: Botulinum toxin hyperhidrosis therapy is medically necessary because Dapsone Pregnancy And Lactation Text: This medication is Pregnancy Category C and is not considered safe during pregnancy or breast feeding. Topical Sulfur Applications Counseling: Topical Sulfur Counseling: Patient counseled that this medication may cause skin irritation or allergic reactions.  In the event of skin irritation, the patient was advised to reduce the amount of the drug applied or use it less frequently.   The patient verbalized understanding of the proper use and possible adverse effects of topical sulfur application.  All of the patient's questions and concerns were addressed. Birth Control Pills Counseling: Birth Control Pill Counseling: I discussed with the patient the potential side effects of OCPs including but not limited to increased risk of stroke, heart attack, thrombophlebitis, deep venous thrombosis, hepatic adenomas, breast changes, GI upset, headaches, and depression.  The patient verbalized understanding of the proper use and possible adverse effects of OCPs. All of the patient's questions and concerns were addressed. Azithromycin Counseling:  I discussed with the patient the risks of azithromycin including but not limited to GI upset, allergic reaction, drug rash, diarrhea, and yeast infections. High Dose Vitamin A Counseling: Side effects reviewed, pt to contact office should one occur. Minocycline Counseling: Patient advised regarding possible photosensitivity and discoloration of the teeth, skin, lips, tongue and gums.  Patient instructed to avoid sunlight, if possible.  When exposed to sunlight, patients should wear protective clothing, sunglasses, and sunscreen.  The patient was instructed to call the office immediately if the following severe adverse effects occur:  hearing changes, easy bruising/bleeding, severe headache, or vision changes.  The patient verbalized understanding of the proper use and possible adverse effects of minocycline.  All of the patient's questions and concerns were addressed. Doxycycline Counseling:  Patient counseled regarding possible photosensitivity and increased risk for sunburn.  Patient instructed to avoid sunlight, if possible.  When exposed to sunlight, patients should wear protective clothing, sunglasses, and sunscreen.  The patient was instructed to call the office immediately if the following severe adverse effects occur:  hearing changes, easy bruising/bleeding, severe headache, or vision changes.  The patient verbalized understanding of the proper use and possible adverse effects of doxycycline.  All of the patient's questions and concerns were addressed. No Topical Retinoid Pregnancy And Lactation Text: This medication is Pregnancy Category C. It is unknown if this medication is excreted in breast milk. Bactrim Counseling:  I discussed with the patient the risks of sulfa antibiotics including but not limited to GI upset, allergic reaction, drug rash, diarrhea, dizziness, photosensitivity, and yeast infections.  Rarely, more serious reactions can occur including but not limited to aplastic anemia, agranulocytosis, methemoglobinemia, blood dyscrasias, liver or kidney failure, lung infiltrates or desquamative/blistering drug rashes. Azithromycin Pregnancy And Lactation Text: This medication is considered safe during pregnancy and is also secreted in breast milk. Spironolactone Pregnancy And Lactation Text: This medication can cause feminization of the male fetus and should be avoided during pregnancy. The active metabolite is also found in breast milk. Topical Retinoid counseling:  Patient advised to apply a pea-sized amount only at bedtime and wait 30 minutes after washing their face before applying.  If too drying, patient may add a non-comedogenic moisturizer. The patient verbalized understanding of the proper use and possible adverse effects of retinoids.  All of the patient's questions and concerns were addressed. Benzoyl Peroxide Counseling: Patient counseled that medicine may cause skin irritation and bleach clothing.  In the event of skin irritation, the patient was advised to reduce the amount of the drug applied or use it less frequently.   The patient verbalized understanding of the proper use and possible adverse effects of benzoyl peroxide.  All of the patient's questions and concerns were addressed. Tazorac Pregnancy And Lactation Text: This medication is not safe during pregnancy. It is unknown if this medication is excreted in breast milk. Bactrim Pregnancy And Lactation Text: This medication is Pregnancy Category D and is known to cause fetal risk.  It is also excreted in breast milk. Doxycycline Pregnancy And Lactation Text: This medication is Pregnancy Category D and not consider safe during pregnancy. It is also excreted in breast milk but is considered safe for shorter treatment courses.

## 2019-09-12 NOTE — ED ADULT NURSE NOTE - PT NEEDS ASSIST
I talked with Dr Encinas and he says the patient needs to be referred to [place] for the aneurysm.    I called and spoke w/his daughter, Natasha, and she expressed understanding.    I will place the referral      scarluis manuel archer CMA  
no

## 2022-03-30 NOTE — ED ADULT NURSE NOTE - DISCHARGE DATE/TIME
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 Biopsies are pending  Plan:  Change omeprazole to take 20 mg twice a day half an hour before breakfast and dinner  Add Carafate as a liquid.    Carafate instructions:    Please ignore the package insert for use of carafate. We are using this drug to coat the esophagus and relieve symptoms while the esophageal lining is healing. To use this medication, please dissolve the tablet in a tablespoon of water and take it 4 times a day i.e. after breakfast, lunch and dinner and at bedtime. Ideally there should be no eating or drinking for approximately one hour to allow the drug to bind to the irritated tissues.      Normal Colonoscopy with no signs of colitis   Biopsies pending    What to expect immediately after the procedure:  1. You will be drowsy for the next few hours. You may forget that Dr. King talked to you after the procedure. This is a normal consequence of the procedure. If you forget the post-procedural discussion and the relative accompanying you cannot provide details please call us at [phone] and ask for the GI Department.   2. Mild soreness of the throat is common and lasts 2-4 days and disappears without treatment  3. Small amounts of bloody material can occasionally be coughed up after the procedure    4. MEDICATIONS:  Resume regular medications today including diabetes medications   A. Blood Thinners and Baby aspirin (81 mg)  1. If you are on Coumadin, you can re-start the medication tonight  2. If you are on clopidogrel (Plavix), ticlopidine, prasugrel, and ticagrelor for stents or coronary artery disease stroke or heart disease, these medications can be restarted today. There is an increased risk of delayed bleeding from polyp removal in  patients taking these medications and you should call our office if you have bleeding  3. If you are taking baby aspirin for heart disease, it should be continued.     B. Pain medications    Ibuprofen, meloxicam, naproxen, celecoxib and indomethacin can increase the risk of bleeding after polyp removal and should be held for 5 days. Tylenol is safe.    5. Do not drive, drink alcohol, use machinery or sign important documents on the day of the procedure  6. You may resume a normal diet    Call us if you have:  1. Although complications after colonoscopy and EGD are uncommon, it's important to recognize early signs of possible complications. Contact your doctor (Call us ([phone] during regular business hours and ask for the GI department or [phone] after hours or on weekends or holidays and ask for the GI doctor on call)  if you notice severe abdominal pain, fever (>101) and chills, or rectal bleeding of more than one-half cup, increased trouble swallowing, severe chest or abdominal pain.  Note that bleeding can occur several days after the procedure.    2. Redness or swelling at the intravenous site that does not resolve in 2-3 days. Mild irritation is common. Use a warm, wet wash cloth over the affected area for 20 minutes for 2-3 days and it will gradually resolve.                15-Feb-2017 14:29

## 2022-11-18 NOTE — ED PROVIDER NOTE - RELIEVING FACTORS
DISCHARGE INSTRUCTIONS     As part of your transition home, we are providing you with this set of discharge instructions, as a guide to help you in that process. In addition to the care provided during your hospital stay, there may be upcoming clinic visits, laboratory appointments, and/or results noted on this After Visit Summary (AVS).     To assist the physicians caring for you during this transition, we would like you remind you of the followin. Please call a Provider for help if you experience any of the following: ?ny questions or concerns, Chills, Constipation for more than 48 hours, Dehydration, and Difficulty breathing, headache, or visual disturbances  2. Activity Instructions: ?ormal activity as tolerated  3. Dressing/Wound Instructions: ?ot applicable  4. Lifting & Weight-bearing Instructions: ?o restrictions  5. Diet: ?eturn to previous diet  6. Miscellaneous:     Please take medications as prescribed  Follow-up with your primary care doctor and the Urology team after you leave the hospital    If you have any questions 24-48 hours after discharge, please feel free to contact the hospital unit/department you were discharged from.                     none

## 2023-04-13 NOTE — ED PROVIDER NOTE - NS ED SCRIBE STATEMENT
Not feeling any better. Still having lose stools 6-7 times a day.    Also, insurance will not cover Mounjaro.looking for alternatives. Hasn't had any medication for a couple weeks.     Lastly, patient was hoping you could clarify the following in the XR of the abdomen:     Redemonstration of clip overlying the right lower quadrant, although  appears as a singular clip as opposed previously 2 clips on abdominal  radiograph 4/3/2023.       Attending

## 2023-08-28 ENCOUNTER — APPOINTMENT (OUTPATIENT)
Dept: SURGERY | Facility: CLINIC | Age: 83
End: 2023-08-28
Payer: MEDICARE

## 2023-08-28 VITALS
DIASTOLIC BLOOD PRESSURE: 81 MMHG | TEMPERATURE: 98 F | HEART RATE: 78 BPM | BODY MASS INDEX: 28.52 KG/M2 | HEIGHT: 62 IN | WEIGHT: 155 LBS | SYSTOLIC BLOOD PRESSURE: 130 MMHG

## 2023-08-28 DIAGNOSIS — Z78.9 OTHER SPECIFIED HEALTH STATUS: ICD-10-CM

## 2023-08-28 PROCEDURE — 99203 OFFICE O/P NEW LOW 30 MIN: CPT

## 2023-09-14 ENCOUNTER — APPOINTMENT (OUTPATIENT)
Age: 83
End: 2023-09-14
Payer: MEDICARE

## 2023-09-14 VITALS
HEART RATE: 96 BPM | BODY MASS INDEX: 30.64 KG/M2 | HEIGHT: 59 IN | SYSTOLIC BLOOD PRESSURE: 152 MMHG | OXYGEN SATURATION: 94 % | DIASTOLIC BLOOD PRESSURE: 83 MMHG | WEIGHT: 152 LBS

## 2023-09-14 DIAGNOSIS — D49.9 NEOPLASM OF UNSPECIFIED BEHAVIOR OF UNSPECIFIED SITE: ICD-10-CM

## 2023-09-14 PROCEDURE — 99214 OFFICE O/P EST MOD 30 MIN: CPT

## 2023-09-14 RX ORDER — PANCRELIPASE 36000; 180000; 114000 [USP'U]/1; [USP'U]/1; [USP'U]/1
36000-114000 CAPSULE, DELAYED RELEASE PELLETS ORAL
Refills: 0 | Status: ACTIVE | COMMUNITY

## 2023-09-14 RX ORDER — PREGABALIN 300 MG/1
CAPSULE ORAL
Refills: 0 | Status: ACTIVE | COMMUNITY

## 2023-09-14 RX ORDER — DULOXETINE HYDROCHLORIDE 30 MG/1
30 CAPSULE, DELAYED RELEASE PELLETS ORAL
Refills: 0 | Status: ACTIVE | COMMUNITY

## 2023-09-14 RX ORDER — PREGABALIN 50 MG/1
50 CAPSULE ORAL
Refills: 0 | Status: ACTIVE | COMMUNITY

## 2023-09-14 RX ORDER — DULOXETINE HYDROCHLORIDE 30 MG/1
CAPSULE, DELAYED RELEASE ORAL
Refills: 0 | Status: ACTIVE | COMMUNITY

## 2023-09-14 RX ORDER — OMEPRAZOLE 40 MG/1
CAPSULE, DELAYED RELEASE ORAL
Refills: 0 | Status: ACTIVE | COMMUNITY

## 2023-09-28 ENCOUNTER — OUTPATIENT (OUTPATIENT)
Dept: OUTPATIENT SERVICES | Facility: HOSPITAL | Age: 83
LOS: 1 days | End: 2023-09-28
Payer: COMMERCIAL

## 2023-09-28 VITALS
HEART RATE: 78 BPM | TEMPERATURE: 98 F | SYSTOLIC BLOOD PRESSURE: 134 MMHG | HEIGHT: 62 IN | WEIGHT: 154.1 LBS | DIASTOLIC BLOOD PRESSURE: 72 MMHG | RESPIRATION RATE: 16 BRPM | OXYGEN SATURATION: 98 %

## 2023-09-28 DIAGNOSIS — K42.9 UMBILICAL HERNIA WITHOUT OBSTRUCTION OR GANGRENE: ICD-10-CM

## 2023-09-28 DIAGNOSIS — Z87.39 PERSONAL HISTORY OF OTHER DISEASES OF THE MUSCULOSKELETAL SYSTEM AND CONNECTIVE TISSUE: Chronic | ICD-10-CM

## 2023-09-28 DIAGNOSIS — Z90.49 ACQUIRED ABSENCE OF OTHER SPECIFIED PARTS OF DIGESTIVE TRACT: Chronic | ICD-10-CM

## 2023-09-28 DIAGNOSIS — Z96.611 PRESENCE OF RIGHT ARTIFICIAL SHOULDER JOINT: Chronic | ICD-10-CM

## 2023-09-28 DIAGNOSIS — Z01.818 ENCOUNTER FOR OTHER PREPROCEDURAL EXAMINATION: ICD-10-CM

## 2023-09-28 DIAGNOSIS — Z98.890 OTHER SPECIFIED POSTPROCEDURAL STATES: Chronic | ICD-10-CM

## 2023-09-28 DIAGNOSIS — Z98.49 CATARACT EXTRACTION STATUS, UNSPECIFIED EYE: Chronic | ICD-10-CM

## 2023-09-28 DIAGNOSIS — Z90.710 ACQUIRED ABSENCE OF BOTH CERVIX AND UTERUS: Chronic | ICD-10-CM

## 2023-09-28 DIAGNOSIS — Z96.653 PRESENCE OF ARTIFICIAL KNEE JOINT, BILATERAL: Chronic | ICD-10-CM

## 2023-09-28 DIAGNOSIS — D49.4 NEOPLASM OF UNSPECIFIED BEHAVIOR OF BLADDER: Chronic | ICD-10-CM

## 2023-09-28 PROCEDURE — G0463: CPT

## 2023-09-28 NOTE — H&P PST ADULT - PROBLEM SELECTOR PLAN 4
Pulmonary precautions to be maintained perioperatively. Instructed pt to bring CPAP machine to hospital on day of surgery to facilitate reinstitution of treatment after surgery.   Sleep study report to be obtained and attached to chart. OR booking and Anesthesia to be notified .

## 2023-09-28 NOTE — H&P PST ADULT - PROBLEM SELECTOR PLAN 2
Instructed to continue pantoprazole and take with sips of water on day of surgery. Follow-up with provider for management.

## 2023-09-28 NOTE — H&P PST ADULT - PRIMARY CARE PROVIDER
Leandro Brunner MD [phone], Cardiology Chris Moon [phone], Pulmonology , [place] - Sleep [facility] - Sleep Disorder Services [phone]

## 2023-09-28 NOTE — H&P PST ADULT - CONTACT INFO FOR SLEEP STUDY
Sleep Disorder Services [phone] , pt is using CPAP every night and will bring it for surgery to hospital , sleep studies one 2 years ago

## 2023-09-28 NOTE — H&P PST ADULT - PROBLEM SELECTOR PLAN 1
Patient is scheduled for laparoscopic umbilical and incarcerated incisional hernia repair  possible open on 10/4/2023.  Written and oral preoperative instructions given to patient with understanding verbalized.   Instructions given to include using 4% chlorhexidine wash as directed starting 3days before day of surgery and inclusive of day of surgery.   Maintaining NPO status post midnight day before surgery  Stopping aspirin, NSAIDs, herbs, vitamins 7days before surgery   Patient is to expect a phone call day before surgery between the hours of 430- 630pm giving arrival time for surgery day.  No need to draw type and screen, pt has hx of t and s in blood bank.

## 2023-09-28 NOTE — H&P PST ADULT - PATIENT OPTIMIZED PENDING
STEPHANIE pending labs , EKG, chest xray, echo ,stress,-0 to otoniel copies from PCP, echo and stress test from Cardiology and sleep test result from sleep center

## 2023-09-28 NOTE — H&P PST ADULT - HISTORY OF PRESENT ILLNESS
83 years old female pmh of nas cataract, diverticulitis, GERD, fibromyalgia, cholecystitis, hx of stomach ulcers , kidney stones ( opt had lithotripsy ), CALVIN on CPAP, psh of bladder tumor surgically removed , nas knee replacement , nas shoulder replacement , nas eyes cataract sx with right eye artificial lens, hx ofm varicose veins  stripping, cholecystectomy , hx of hysterectomy ( fibroids ) presented with c/o of tenderness around umbilicus and right upper quadrant pain at area where pt had cholecystectomy, pt was diagnosed with umbilical hernia without obstruction or gangrene and is scheduled for laparoscopic umbilical and incarcerated incisional hernia repair  possible open on 10/4/2023.

## 2023-09-28 NOTE — H&P PST ADULT - REASON FOR ADMISSION
[FreeTextEntry1] : 60 yr-old man noted abrupt onset of bilateral buttock pain radiating to both legs  and left foot 2 weeks ago after exercising for 1 hr on an exercise stationary bike.  Has noted episodic numbness left foot. Some benefit from Aleve.\par \par He has hx of low back pain 20 years ago when he weighed 270 lbs. Lost weight with exercise. Hx of bilateral hip replacements, left 12 years ago and right 8 years ago.\par \par He is . Smokes "4 cigarettes/day" and works in a [employer] office. " surgery to fix belly button hernia and hernia after gallbladder surgery'

## 2023-09-28 NOTE — H&P PST ADULT - GASTROINTESTINAL COMMENTS
tenderness to deep palpation to right upper quadrant where is scar after cholecystectomy , tenderness to umbilicus right upper quadrant abdominal pain , umbilical area discomfort

## 2023-09-29 DIAGNOSIS — M79.7 FIBROMYALGIA: ICD-10-CM

## 2023-09-29 DIAGNOSIS — K42.9 UMBILICAL HERNIA WITHOUT OBSTRUCTION OR GANGRENE: ICD-10-CM

## 2023-09-29 DIAGNOSIS — K21.9 GASTRO-ESOPHAGEAL REFLUX DISEASE WITHOUT ESOPHAGITIS: ICD-10-CM

## 2023-09-29 DIAGNOSIS — G47.33 OBSTRUCTIVE SLEEP APNEA (ADULT) (PEDIATRIC): ICD-10-CM

## 2023-09-29 DIAGNOSIS — Z98.41 CATARACT EXTRACTION STATUS, RIGHT EYE: Chronic | ICD-10-CM

## 2023-10-03 ENCOUNTER — TRANSCRIPTION ENCOUNTER (OUTPATIENT)
Age: 83
End: 2023-10-03

## 2023-10-04 ENCOUNTER — OUTPATIENT (OUTPATIENT)
Dept: OUTPATIENT SERVICES | Facility: HOSPITAL | Age: 83
LOS: 1 days | End: 2023-10-04
Payer: COMMERCIAL

## 2023-10-04 ENCOUNTER — TRANSCRIPTION ENCOUNTER (OUTPATIENT)
Age: 83
End: 2023-10-04

## 2023-10-04 ENCOUNTER — APPOINTMENT (OUTPATIENT)
Dept: SURGERY | Facility: HOSPITAL | Age: 83
End: 2023-10-04
Payer: MEDICARE

## 2023-10-04 VITALS
TEMPERATURE: 98 F | HEART RATE: 82 BPM | DIASTOLIC BLOOD PRESSURE: 83 MMHG | WEIGHT: 154.1 LBS | HEIGHT: 62 IN | OXYGEN SATURATION: 96 % | SYSTOLIC BLOOD PRESSURE: 129 MMHG | RESPIRATION RATE: 16 BRPM

## 2023-10-04 VITALS
HEART RATE: 76 BPM | SYSTOLIC BLOOD PRESSURE: 142 MMHG | RESPIRATION RATE: 16 BRPM | OXYGEN SATURATION: 96 % | TEMPERATURE: 97 F | DIASTOLIC BLOOD PRESSURE: 66 MMHG

## 2023-10-04 DIAGNOSIS — Z96.653 PRESENCE OF ARTIFICIAL KNEE JOINT, BILATERAL: Chronic | ICD-10-CM

## 2023-10-04 DIAGNOSIS — Z96.611 PRESENCE OF RIGHT ARTIFICIAL SHOULDER JOINT: Chronic | ICD-10-CM

## 2023-10-04 DIAGNOSIS — Z98.49 CATARACT EXTRACTION STATUS, UNSPECIFIED EYE: Chronic | ICD-10-CM

## 2023-10-04 DIAGNOSIS — Z98.890 OTHER SPECIFIED POSTPROCEDURAL STATES: Chronic | ICD-10-CM

## 2023-10-04 DIAGNOSIS — Z01.818 ENCOUNTER FOR OTHER PREPROCEDURAL EXAMINATION: ICD-10-CM

## 2023-10-04 DIAGNOSIS — Z90.710 ACQUIRED ABSENCE OF BOTH CERVIX AND UTERUS: Chronic | ICD-10-CM

## 2023-10-04 DIAGNOSIS — Z90.49 ACQUIRED ABSENCE OF OTHER SPECIFIED PARTS OF DIGESTIVE TRACT: Chronic | ICD-10-CM

## 2023-10-04 DIAGNOSIS — K42.9 UMBILICAL HERNIA WITHOUT OBSTRUCTION OR GANGRENE: ICD-10-CM

## 2023-10-04 DIAGNOSIS — D49.4 NEOPLASM OF UNSPECIFIED BEHAVIOR OF BLADDER: Chronic | ICD-10-CM

## 2023-10-04 DIAGNOSIS — Z87.39 PERSONAL HISTORY OF OTHER DISEASES OF THE MUSCULOSKELETAL SYSTEM AND CONNECTIVE TISSUE: Chronic | ICD-10-CM

## 2023-10-04 DIAGNOSIS — Z98.41 CATARACT EXTRACTION STATUS, RIGHT EYE: Chronic | ICD-10-CM

## 2023-10-04 LAB — BLD GP AB SCN SERPL QL: SIGNIFICANT CHANGE UP

## 2023-10-04 PROCEDURE — 86901 BLOOD TYPING SEROLOGIC RH(D): CPT

## 2023-10-04 PROCEDURE — 86850 RBC ANTIBODY SCREEN: CPT

## 2023-10-04 PROCEDURE — C1889: CPT

## 2023-10-04 PROCEDURE — 49594 RPR AA HRN 1ST 3-10 NCR/STRN: CPT

## 2023-10-04 PROCEDURE — 49593 RPR AA HRN 1ST 3-10 RDC: CPT

## 2023-10-04 PROCEDURE — C1781: CPT

## 2023-10-04 PROCEDURE — 86900 BLOOD TYPING SEROLOGIC ABO: CPT

## 2023-10-04 PROCEDURE — 36415 COLL VENOUS BLD VENIPUNCTURE: CPT

## 2023-10-04 PROCEDURE — 49593 RPR AA HRN 1ST 3-10 RDC: CPT | Mod: AS

## 2023-10-04 DEVICE — ETHICON HERNIA SECURESTRAP 5MM SIZE 25: Type: IMPLANTABLE DEVICE | Status: FUNCTIONAL

## 2023-10-04 DEVICE — MESH HERNIA VENTRAL VENTRALIGHT ST ELLIPSE 4 X 6": Type: IMPLANTABLE DEVICE | Status: FUNCTIONAL

## 2023-10-04 RX ORDER — SODIUM CHLORIDE 9 MG/ML
3 INJECTION INTRAMUSCULAR; INTRAVENOUS; SUBCUTANEOUS EVERY 8 HOURS
Refills: 0 | Status: DISCONTINUED | OUTPATIENT
Start: 2023-10-04 | End: 2023-10-04

## 2023-10-04 RX ORDER — ONDANSETRON 8 MG/1
4 TABLET, FILM COATED ORAL ONCE
Refills: 0 | Status: DISCONTINUED | OUTPATIENT
Start: 2023-10-04 | End: 2023-10-04

## 2023-10-04 RX ORDER — OXYCODONE HYDROCHLORIDE 5 MG/1
1 TABLET ORAL
Qty: 4 | Refills: 0
Start: 2023-10-04 | End: 2023-10-04

## 2023-10-04 RX ORDER — PANTOPRAZOLE SODIUM 20 MG/1
1 TABLET, DELAYED RELEASE ORAL
Refills: 0 | DISCHARGE

## 2023-10-04 RX ORDER — LIPASE/PROTEASE/AMYLASE 16-48-48K
1 CAPSULE,DELAYED RELEASE (ENTERIC COATED) ORAL
Refills: 0 | DISCHARGE

## 2023-10-04 RX ORDER — HYDROMORPHONE HYDROCHLORIDE 2 MG/ML
1 INJECTION INTRAMUSCULAR; INTRAVENOUS; SUBCUTANEOUS
Refills: 0 | Status: DISCONTINUED | OUTPATIENT
Start: 2023-10-04 | End: 2023-10-04

## 2023-10-04 RX ORDER — HYDROMORPHONE HYDROCHLORIDE 2 MG/ML
0.5 INJECTION INTRAMUSCULAR; INTRAVENOUS; SUBCUTANEOUS
Refills: 0 | Status: DISCONTINUED | OUTPATIENT
Start: 2023-10-04 | End: 2023-10-04

## 2023-10-04 RX ORDER — DULOXETINE HYDROCHLORIDE 30 MG/1
1 CAPSULE, DELAYED RELEASE ORAL
Refills: 0 | DISCHARGE

## 2023-10-04 RX ORDER — OXYCODONE HYDROCHLORIDE 5 MG/1
5 TABLET ORAL ONCE
Refills: 0 | Status: DISCONTINUED | OUTPATIENT
Start: 2023-10-04 | End: 2023-10-04

## 2023-10-04 RX ADMIN — OXYCODONE HYDROCHLORIDE 5 MILLIGRAM(S): 5 TABLET ORAL at 15:43

## 2023-10-04 RX ADMIN — OXYCODONE HYDROCHLORIDE 5 MILLIGRAM(S): 5 TABLET ORAL at 16:40

## 2023-10-04 NOTE — ASU PATIENT PROFILE, ADULT - LANGUAGE ASSISTANCE NEEDED
pt. stated she speaks some english as well/Yes-Patient/Caregiver accepts free interpretation services...

## 2023-10-04 NOTE — ASU DISCHARGE PLAN (ADULT/PEDIATRIC) - ASU DC SPECIAL INSTRUCTIONSFT
Please follow-up with your surgeon in 1 week. Drink plenty of fluids and rest as needed. Call for any fever over 101, nausea, vomiting, severe pain, no passing of gas or bowel movement.     DIET   You may resume your regular diet as normal.     SURGICAL SITES   Remove outer dressing and keep white steri-strips in place allowing them to fall off on their own. You may shower 48 hours post-operatively but do not bathe or soak in the water for 1-2 weeks; pat dry. If you notice any signs of surgical site infection (ie. redness, swelling, pain, pus drainage), please seek medical care immediately.   ACTIVITY Do not lift anything heavier than 10 pounds for 2 weeks and avoid strenuous activity for 4-6 weeks.     PAIN CONTROL   You may take Motrin 600mg (with food) or Tylenol 650mg as needed for mild pain. Stagger one medication 3 hours after the other for maximum pain control. Maximum daily dose of Tylenol should not exceed 4grams/day.  You may take your prescribed oxycodone for severe breakthrough pain not that is not relieved by Tylenol/Motrin. Do not drive or make important decisions while taking this medication and do not take more than 4 pills in 24 hours.

## 2023-10-04 NOTE — ASU PATIENT PROFILE, ADULT - NSICDXPASTMEDICALHX_GEN_ALL_CORE_FT
PAST MEDICAL HISTORY:  Bilateral cataracts     Diverticulitis     Fibromyalgia     GERD (gastroesophageal reflux disease)     History of stomach ulcers     Kidney stone treated with lithotripsy    CALVIN on CPAP

## 2023-10-04 NOTE — ASU DISCHARGE PLAN (ADULT/PEDIATRIC) - CARE PROVIDER_API CALL
Raymundo Shelton  Surgery  [address]  [place] [address]  Phone: [phone]  Fax: [phone]  Follow Up Time: 2 weeks

## 2023-10-04 NOTE — BRIEF OPERATIVE NOTE - NSICDXBRIEFPROCEDURE_GEN_ALL_CORE_FT
PROCEDURES:  Laparoscopic repair of incisional or ventral hernia with transversus abdominis muscle release 04-Oct-2023 13:45:51 lap incarcerated incisional hernia repair with mesh Eileen Wu

## 2023-10-04 NOTE — ASU PATIENT PROFILE, ADULT - NSICDXPASTSURGICALHX_GEN_ALL_CORE_FT
PAST SURGICAL HISTORY:  Bladder tumor surgically removed; benign    H/O total knee replacement, bilateral     H/O varicose vein stripping     History of cataract surgery     History of cholecystectomy     History of osteoarthritis     S/p bilateral shoulder joint replacement     S/P cataract extraction and insertion of intraocular lens, right     S/P hysterectomy

## 2023-10-11 PROBLEM — H26.9 UNSPECIFIED CATARACT: Chronic | Status: ACTIVE | Noted: 2023-09-29

## 2023-10-11 PROBLEM — Z87.11 PERSONAL HISTORY OF PEPTIC ULCER DISEASE: Chronic | Status: ACTIVE | Noted: 2023-09-28

## 2023-10-11 PROBLEM — G47.33 OBSTRUCTIVE SLEEP APNEA (ADULT) (PEDIATRIC): Chronic | Status: ACTIVE | Noted: 2023-09-28

## 2023-10-11 PROBLEM — K57.92 DIVERTICULITIS OF INTESTINE, PART UNSPECIFIED, WITHOUT PERFORATION OR ABSCESS WITHOUT BLEEDING: Chronic | Status: ACTIVE | Noted: 2023-09-28

## 2023-10-17 ENCOUNTER — NON-APPOINTMENT (OUTPATIENT)
Age: 83
End: 2023-10-17

## 2023-11-02 ENCOUNTER — APPOINTMENT (OUTPATIENT)
Dept: SURGERY | Facility: CLINIC | Age: 83
End: 2023-11-02
Payer: MEDICARE

## 2023-11-02 DIAGNOSIS — K43.0 INCISIONAL HERNIA WITH OBSTRUCTION, W/OUT GANGRENE: ICD-10-CM

## 2023-11-02 PROCEDURE — 99213 OFFICE O/P EST LOW 20 MIN: CPT

## 2024-02-08 ENCOUNTER — APPOINTMENT (OUTPATIENT)
Dept: SURGERY | Facility: CLINIC | Age: 84
End: 2024-02-08
Payer: MEDICARE

## 2024-02-08 VITALS
BODY MASS INDEX: 30.64 KG/M2 | WEIGHT: 152 LBS | SYSTOLIC BLOOD PRESSURE: 147 MMHG | OXYGEN SATURATION: 99 % | DIASTOLIC BLOOD PRESSURE: 82 MMHG | HEART RATE: 83 BPM | HEIGHT: 59 IN

## 2024-02-08 DIAGNOSIS — K42.9 UMBILICAL HERNIA W/OUT OBSTRUCTION OR GANGRENE: ICD-10-CM

## 2024-02-08 PROCEDURE — 99213 OFFICE O/P EST LOW 20 MIN: CPT

## 2024-02-08 NOTE — ASSESSMENT
[FreeTextEntry1] : Ms. HALL is doing well, with excellent post-operative recovery. All surgical incisions  healed well and as expected. There is no evidence of  complication or recurrence, and she is progressing as expected. Patient can return to normal activity without restrictions.  Patient's questions and concerns addressed to patient's satisfaction.

## 2024-02-08 NOTE — HISTORY OF PRESENT ILLNESS
[de-identified] : Ms. HALL  is s/p  laparoscopic umbilical and incarcerated     incisional hernia   repair on 10/04/2023. Today  Ms. HALL offers no complaints. patient reports no fever or  chills. patient reports occasional discomfort in the  left lower quadrant .    patient reports good bowel movements and appetite.

## 2024-02-08 NOTE — PLAN
[FreeTextEntry1] : Ms. HALL will follow up  if needed. Warning signs, follow up, and restrictions were discussed with the patient.

## 2024-02-08 NOTE — PHYSICAL EXAM
[Alert] : alert [Oriented to Person] : oriented to person [Oriented to Place] : oriented to place [Oriented to Time] : oriented to time [Calm] : calm [de-identified] : Surgical wounds healed  well.   no signs of  hernia recurrence

## 2024-02-22 NOTE — PATIENT PROFILE ADULT. - NS PRO ABUSE SCREEN AFRAID ANYONE YN
Emergency Medicine Transition of Care Note.    I received Rasheed Antonio in signout from Dr. Leonard.  Please see the previous ED provider note for all HPI, PE and MDM up to the time of signout at 0700. This is in addition to the primary record.    In brief Rasheed Antonio is an 87 y.o. male presenting for No chief complaint on file.    At the time of signout we were awaiting: Input from family.    ED Course as of 02/22/24 0814   Thu Feb 22, 2024 0732 Discussed with Leoncio Antonio and he indicates that the patient may be developing dementia.  He notes up until last week he was functioning at home well by himself.  Notes decline in the last week.  Mr. Antonio is coming to the emergency department to see if to take the patient to his house.  [JA]      ED Course User Index  [JA] Jacky Reddy DO         Diagnoses as of 02/22/24 0814   Confusion       Medical Decision Making  Reevaluated the patient.  Patient denies any complaints at this time.  Patient discussed with his son.  Patient son is now at bedside.  Given the concern for dementia the patient's son agrees to have him stay with him at his house.  Patient's son will discuss with primary care physician and return to ED if having worsening symptoms or other concerns.    Final diagnoses:   [R41.0] Confusion           Procedure  Procedures    Jacky Reddy DO    no

## 2024-07-01 NOTE — TRANSFER ACCEPTANCE NOTE - GASTROINTESTINAL
K 6.9.   Minimal UOP per nursing staff.   HD room called for emergent dialysis and order placed.     Question Answer   Estimated Dry Weight (kg) (Value cannot be 0 kg) To be determined   Type of Therapy Hemodialysis   Access to be utilized Fistula/graft   Special Instructions 15 gauge   Blood Flow 300 - 400 mL/min   Titrate to pressures (arterial and venous) for maximum flow rate   Dialysate Potassium 1   Dialysate Calcium 2.5   Dialysate Sodium 140   Dialysate Bicarbonate 35   Dialysate Flow Rate 800 mL/min   Dialysate Temperature 36 degrees Celsius   Hemodialysis Duration (hours) 3.5   Hemodialysis Weight Loss (kg as tolerated) 2 - 3   Ultrafiltration Profile No   Maintain Pressures SBP   Maintain SBP greater than (mmHg) 100   Dialyzer ([place]) F180   Heparin Ordered No       Lj Blank MD  [facility]  [phone]  For night and weekend coverage please call [phone].       [facility]  Heart Transplant  Progress Note    Patient Name: Tim Richards  MRN: [id number]  Admission Date: 3/1/2018  Hospital Length of Stay: 10 days  Attending Physician: Yg Kaufman MD  Primary Care Provider: Ja Méndez MD  Principal Problem:Acute decompensated heart failure    Subjective:     Interval History: Intubated. Off sedation- following commands    Continuous Infusions:   sodium chloride 0.9%      amiodarone in dextrose 5% 0.5 mg/min (03/11/18 1100)    DOBUTamine 5 mcg/kg/min (03/11/18 1100)    epinephrine infusion 0.06 mcg/kg/min (03/11/18 1100)    furosemide (LASIX) 1 mg/mL infusion (non-titrating) 5 mg/hr (03/11/18 1100)    heparin (porcine) in 5 % dex 600 Units/hr (03/11/18 1114)    insulin (HUMAN R) infusion (adults) Stopped (03/09/18 2200)    nicardipine Stopped (03/10/18 1132)    nitric oxide gas       Scheduled Meds:   ceFEPime (MAXIPIME) IVPB  1 g Intravenous Q12H    docusate sodium  200 mg Oral QHS    etomidate        ferrous gluconate  324 mg Oral Daily with breakfast    mupirocin   Nasal BID    pantoprazole  40 mg Oral Daily    pantoprazole  40 mg Intravenous Daily    potassium chloride  40 mEq Intravenous Once    sodium chloride 0.9%  3 mL Intravenous Q8H    succinylcholine        warfarin  1 mg Oral Once     PRN Meds:sodium chloride, acetaminophen, albumin human 5%, albuterol sulfate, bisacodyl, dextrose 50%, dextrose 50%, fentaNYL, magnesium hydroxide 400 mg/5 ml, oxyCODONE, oxyCODONE    Review of patient's allergies indicates:   Allergen Reactions    Lisinopril Anaphylaxis     Objective:     Vital Signs (Most Recent):  Temp: (!) 100.8 °F (38.2 °C) (03/11/18 1100)  Pulse: 97 (03/11/18 1100)  Resp: (!) 40 (03/11/18 0615)  BP: (!) 82/0 (03/11/18 0700)  SpO2: 99 % (03/11/18 1100) Vital Signs (24h Range):  Temp:  [99.4 °F (37.4 °C)-101.3 °F (38.5 °C)] 100.8 °F (38.2 °C)  Pulse:  [] 97  Resp:  [21-40] 40  SpO2:  [97 %-100 %] 99  %  BP: (82-91)/(0-66) 82/0  Arterial Line BP: (66-86)/(58-72) 80/64     Patient Vitals for the past 72 hrs (Last 3 readings):   Weight   03/11/18 0500 119.7 kg (263 lb 14.3 oz)   03/10/18 0318 119 kg (262 lb 5.6 oz)   03/09/18 0333 115.9 kg (255 lb 8.2 oz)     Body mass index is 34.82 kg/m².      Intake/Output Summary (Last 24 hours) at 03/11/18 1207  Last data filed at 03/11/18 1100   Gross per 24 hour   Intake          2386.32 ml   Output             3267 ml   Net          -880.68 ml     Hemodynamic Parameters:    Physical Exam   Constitutional: He appears well-developed and well-nourished. No distress.   Intubated    HENT:   Head: Normocephalic and atraumatic.   Neck: Normal range of motion. No JVD present.   Cardiovascular: Normal rate.  Exam reveals no friction rub.    No murmur heard.  Normal VAD hum   Pulmonary/Chest: Effort normal. No respiratory distress. He has no wheezes.   Abdominal: Soft. He exhibits no distension.   Musculoskeletal: Normal range of motion. He exhibits no edema.   Neurological: He is alert.   Following commands this am, off sedation   Skin: Skin is warm. Capillary refill takes 2 to 3 seconds. He is not diaphoretic. No erythema.     Significant Labs:  CBC:    Recent Labs  Lab 03/11/18  0014 03/11/18  0422 03/11/18  0733   WBC 14.85* 14.82*  14.82* 14.09*   RBC 2.88* 2.72*  2.72* 2.76*   HGB 7.9* 7.7*  7.7* 7.3*   HCT 23.8* 22.5*  22.5* 22.6*   * 133*  133* 127*   MCV 83 83  83 82   MCH 27.4 28.3  28.3 26.4*   MCHC 33.2 34.2  34.2 32.3     BNP:    Recent Labs  Lab 03/05/18  0820 03/07/18  0258 03/09/18  0308   BNP 1,270* 1,110* 1,114*     CMP:    Recent Labs  Lab 03/09/18  0308  03/10/18  0400  03/11/18  0014 03/11/18  0422 03/11/18  0733   *  < > 149*  < > 148* 145*  145*  145* 137*   CALCIUM 9.5  < > 9.3  < > 9.5 9.5  9.5  9.5 9.4   ALBUMIN 3.6  --  3.1*  --   --  3.0*  --    PROT 6.2  --  6.1  --   --  6.4  --      < > 137  < > 137 138  138  138 138    K 3.4*  < > 3.8  < > 3.8 3.6  3.6  3.6 3.4*   CO2 25  < > 27  < > 26 27  27  27 26   CL 99  < > 98  < > 99 99  99  99 99   BUN 23*  < > 25*  < > 34* 37*  37*  37* 39*   CREATININE 2.2*  < > 2.2*  < > 2.5* 2.6*  2.6*  2.6* 2.7*   ALKPHOS 55  --  60  --   --  63  --    ALT 27  --  21  --   --  16  --    AST 89*  --  56*  --   --  44*  --    BILITOT 1.4*  --  2.0*  --   --  2.4*  --    < > = values in this interval not displayed.   Coagulation:     Recent Labs  Lab 03/11/18  0014 03/11/18  0422 03/11/18  0733   INR 1.0 1.0  1.0 0.9   APTT 27.4  27.4 25.5 27.1     LDH:    Recent Labs  Lab 03/11/18  0422   *     Microbiology:  Microbiology Results (last 7 days)     Procedure Component Value Units Date/Time    Culture, Respiratory with Gram Stain [[id number]] Collected:  03/11/18 0800    Order Status:  Sent Specimen:  Respiratory from Endotracheal Aspirate Updated:  03/11/18 0900    Blood culture [[id number]] Collected:  03/11/18 0651    Order Status:  Sent Specimen:  Blood from Peripheral, Wrist, Right Updated:  03/11/18 0800    Blood culture [[id number]] Collected:  03/11/18 0650    Order Status:  Sent Specimen:  Blood from Peripheral, Hand, Right Updated:  03/11/18 0757    Blood culture [[id number]] Collected:  03/06/18 1107    Order Status:  Completed Specimen:  Blood Updated:  03/10/18 1628     Blood Culture, Routine No Growth to date     Blood Culture, Routine No Growth to date     Blood Culture, Routine No Growth to date     Blood Culture, Routine No Growth to date     Blood Culture, Routine No Growth to date    Blood culture [[id number]] Collected:  03/06/18 1107    Order Status:  Completed Specimen:  Blood Updated:  03/10/18 1628     Blood Culture, Routine No Growth to date     Blood Culture, Routine No Growth to date     Blood Culture, Routine No Growth to date     Blood Culture, Routine No Growth to date     Blood Culture, Routine No Growth to date    IV catheter culture [[id number]] Collected:   03/07/18 1151    Order Status:  Completed Specimen:  Catheter Tip from Catheter Tip, Intrajugular Updated:  03/10/18 0659     Aerobic Culture - Cath tip No growth    Blood culture [[id number]]     Order Status:  Canceled Specimen:  Blood     Blood culture [[id number]]     Order Status:  Canceled Specimen:  Blood           I have reviewed all pertinent labs within the past 24 hours.    Estimated Creatinine Clearance: 43.9 mL/min (A) (based on SCr of 2.7 mg/dL (H)).    Diagnostic Results:  I have reviewed and interpreted all pertinent imaging results/findings within the past 24 hours.    Assessment and Plan:     Mr. Richards is a 53 y.o. year old Black or  male who has presents as f/u from hospitalization for ADHF after presenting to clinic 1/10/17 as initial consult. advanced surgical options (LVAD/OHT).    This 54 yo BM has had CHF since 2011 at which time an angiogram did not demonstrate any CAD.  He is on amiodarone for VT. He was admitted from 1/12-1/17/18 (see d/c summary) where he was treated for ADHF and initiation of w/u for advanced options. RHC during that admission showed RA 17 and PCWP 35 as well as severely reduced CI 1.6. Though not optimized, he was discharged per his request so as not to lose his job/insurance--see Dr. Hadley's attestation for full details on d/c summary. Since d/c, his Scr has risen from 2.0 on discharge to 2.8 (1/23/18). His BNP had declined to 1040.  He presents today for scheduled hospital f/u.      Today, he reports feeling well. He says he has more energy than usual. His only complaints are cramping and xerosis. He denies n/v/d, no CP, palpitations or syncope. He has stable orthostatic dizziness/LH. No PND or orthopnea. His COLEMAN is improved from discharge and his weight is down about 5-7 pounds on his home scale. Of note, his Scr on labs from 2 days ago showed Scr 2.8 up from 2.0. T. Bili was down from 1.1 to 0.6 and BNP was down to 1040 from 1700.  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 No labs initially ordered but I ordered and sent him down.     LVAD (left ventricular assist device) present    -HeartMate II Implanted 3/8/18 as DT with repositioning of outflow graft 3/9/18.  S/p chest closure 3/10/18  -CTS Primary  -Implanted by Dr. Kaufman  -Anticoagulation per Primary Coumadin  -Speed set at 9000, LSL 8600 rpm  -Interrogation notable for no events  -Not listed for OHTx  - On Epi,  and NO  -Amio drip for A Fib per CTS  -Remains intubated but CTS plans to extubate today        Acute on chronic combined systolic and diastolic heart failure    -NICM  -now s/p HM II LVAD                CKD (chronic kidney disease), stage III    - cr 2.6 today  -Baseline creat 1.6-2.0.        PVT (paroxysmal ventricular tachycardia)    -has ICD        Biventricular implantable cardioverter-defibrillator in situ    - in place  - ICD interrogated, event noted on 2/25/18 with appropriate shock for VT            Nely Wilkinson PA-C  Heart Transplant  [facility]   details… Soft, non-tender, no hepatosplenomegaly, normal bowel sounds

## 2024-10-16 ENCOUNTER — EMERGENCY (EMERGENCY)
Facility: HOSPITAL | Age: 84
LOS: 1 days | Discharge: HOME | End: 2024-10-16
Attending: EMERGENCY MEDICINE
Payer: COMMERCIAL

## 2024-10-16 VITALS
SYSTOLIC BLOOD PRESSURE: 144 MMHG | OXYGEN SATURATION: 99 % | TEMPERATURE: 98 F | DIASTOLIC BLOOD PRESSURE: 77 MMHG | HEIGHT: 62 IN | WEIGHT: 145.51 LBS | HEART RATE: 90 BPM | RESPIRATION RATE: 18 BRPM

## 2024-10-16 VITALS
OXYGEN SATURATION: 97 % | HEART RATE: 92 BPM | DIASTOLIC BLOOD PRESSURE: 74 MMHG | TEMPERATURE: 98 F | RESPIRATION RATE: 16 BRPM | SYSTOLIC BLOOD PRESSURE: 153 MMHG

## 2024-10-16 DIAGNOSIS — D49.4 NEOPLASM OF UNSPECIFIED BEHAVIOR OF BLADDER: Chronic | ICD-10-CM

## 2024-10-16 DIAGNOSIS — Z96.653 PRESENCE OF ARTIFICIAL KNEE JOINT, BILATERAL: Chronic | ICD-10-CM

## 2024-10-16 DIAGNOSIS — Z98.49 CATARACT EXTRACTION STATUS, UNSPECIFIED EYE: Chronic | ICD-10-CM

## 2024-10-16 DIAGNOSIS — Z90.49 ACQUIRED ABSENCE OF OTHER SPECIFIED PARTS OF DIGESTIVE TRACT: Chronic | ICD-10-CM

## 2024-10-16 DIAGNOSIS — Z98.41 CATARACT EXTRACTION STATUS, RIGHT EYE: Chronic | ICD-10-CM

## 2024-10-16 DIAGNOSIS — Z90.710 ACQUIRED ABSENCE OF BOTH CERVIX AND UTERUS: Chronic | ICD-10-CM

## 2024-10-16 DIAGNOSIS — Z98.890 OTHER SPECIFIED POSTPROCEDURAL STATES: Chronic | ICD-10-CM

## 2024-10-16 DIAGNOSIS — Z87.39 PERSONAL HISTORY OF OTHER DISEASES OF THE MUSCULOSKELETAL SYSTEM AND CONNECTIVE TISSUE: Chronic | ICD-10-CM

## 2024-10-16 DIAGNOSIS — Z96.611 PRESENCE OF RIGHT ARTIFICIAL SHOULDER JOINT: Chronic | ICD-10-CM

## 2024-10-16 LAB
ALBUMIN SERPL ELPH-MCNC: 3.9 G/DL — SIGNIFICANT CHANGE UP (ref 3.5–5)
ALP SERPL-CCNC: 88 U/L — SIGNIFICANT CHANGE UP (ref 40–120)
ALT FLD-CCNC: 22 U/L DA — SIGNIFICANT CHANGE UP (ref 10–60)
ANION GAP SERPL CALC-SCNC: 7 MMOL/L — SIGNIFICANT CHANGE UP (ref 5–17)
APPEARANCE UR: CLEAR — SIGNIFICANT CHANGE UP
AST SERPL-CCNC: 19 U/L — SIGNIFICANT CHANGE UP (ref 10–40)
BASOPHILS # BLD AUTO: 0.06 K/UL — SIGNIFICANT CHANGE UP (ref 0–0.2)
BASOPHILS NFR BLD AUTO: 0.5 % — SIGNIFICANT CHANGE UP (ref 0–2)
BILIRUB SERPL-MCNC: 0.5 MG/DL — SIGNIFICANT CHANGE UP (ref 0.2–1.2)
BILIRUB UR-MCNC: NEGATIVE — SIGNIFICANT CHANGE UP
BUN SERPL-MCNC: 11 MG/DL — SIGNIFICANT CHANGE UP (ref 7–18)
CALCIUM SERPL-MCNC: 9.5 MG/DL — SIGNIFICANT CHANGE UP (ref 8.4–10.5)
CHLORIDE SERPL-SCNC: 105 MMOL/L — SIGNIFICANT CHANGE UP (ref 96–108)
CO2 SERPL-SCNC: 26 MMOL/L — SIGNIFICANT CHANGE UP (ref 22–31)
COLOR SPEC: YELLOW — SIGNIFICANT CHANGE UP
CREAT SERPL-MCNC: 0.62 MG/DL — SIGNIFICANT CHANGE UP (ref 0.5–1.3)
DIFF PNL FLD: NEGATIVE — SIGNIFICANT CHANGE UP
EGFR: 89 ML/MIN/1.73M2 — SIGNIFICANT CHANGE UP
EOSINOPHIL # BLD AUTO: 0.01 K/UL — SIGNIFICANT CHANGE UP (ref 0–0.5)
EOSINOPHIL NFR BLD AUTO: 0.1 % — SIGNIFICANT CHANGE UP (ref 0–6)
GLUCOSE SERPL-MCNC: 110 MG/DL — HIGH (ref 70–99)
GLUCOSE UR QL: NEGATIVE MG/DL — SIGNIFICANT CHANGE UP
HCT VFR BLD CALC: 40.4 % — SIGNIFICANT CHANGE UP (ref 34.5–45)
HGB BLD-MCNC: 13.6 G/DL — SIGNIFICANT CHANGE UP (ref 11.5–15.5)
IMM GRANULOCYTES NFR BLD AUTO: 0.8 % — SIGNIFICANT CHANGE UP (ref 0–0.9)
KETONES UR-MCNC: NEGATIVE MG/DL — SIGNIFICANT CHANGE UP
LACTATE SERPL-SCNC: 1.3 MMOL/L — SIGNIFICANT CHANGE UP (ref 0.7–2)
LEUKOCYTE ESTERASE UR-ACNC: NEGATIVE — SIGNIFICANT CHANGE UP
LIDOCAIN IGE QN: 34 U/L — SIGNIFICANT CHANGE UP (ref 13–75)
LYMPHOCYTES # BLD AUTO: 1.33 K/UL — SIGNIFICANT CHANGE UP (ref 1–3.3)
LYMPHOCYTES # BLD AUTO: 10.1 % — LOW (ref 13–44)
MCHC RBC-ENTMCNC: 30.9 PG — SIGNIFICANT CHANGE UP (ref 27–34)
MCHC RBC-ENTMCNC: 33.7 GM/DL — SIGNIFICANT CHANGE UP (ref 32–36)
MCV RBC AUTO: 91.8 FL — SIGNIFICANT CHANGE UP (ref 80–100)
MONOCYTES # BLD AUTO: 0.65 K/UL — SIGNIFICANT CHANGE UP (ref 0–0.9)
MONOCYTES NFR BLD AUTO: 4.9 % — SIGNIFICANT CHANGE UP (ref 2–14)
NEUTROPHILS # BLD AUTO: 11.06 K/UL — HIGH (ref 1.8–7.4)
NEUTROPHILS NFR BLD AUTO: 83.6 % — HIGH (ref 43–77)
NITRITE UR-MCNC: NEGATIVE — SIGNIFICANT CHANGE UP
NRBC # BLD: 0 /100 WBCS — SIGNIFICANT CHANGE UP (ref 0–0)
PH UR: 7.5 — SIGNIFICANT CHANGE UP (ref 5–8)
PLATELET # BLD AUTO: 324 K/UL — SIGNIFICANT CHANGE UP (ref 150–400)
POTASSIUM SERPL-MCNC: 4.2 MMOL/L — SIGNIFICANT CHANGE UP (ref 3.5–5.3)
POTASSIUM SERPL-SCNC: 4.2 MMOL/L — SIGNIFICANT CHANGE UP (ref 3.5–5.3)
PROT SERPL-MCNC: 7.7 G/DL — SIGNIFICANT CHANGE UP (ref 6–8.3)
PROT UR-MCNC: NEGATIVE MG/DL — SIGNIFICANT CHANGE UP
RBC # BLD: 4.4 M/UL — SIGNIFICANT CHANGE UP (ref 3.8–5.2)
RBC # FLD: 13.3 % — SIGNIFICANT CHANGE UP (ref 10.3–14.5)
SODIUM SERPL-SCNC: 138 MMOL/L — SIGNIFICANT CHANGE UP (ref 135–145)
SP GR SPEC: 1.01 — SIGNIFICANT CHANGE UP (ref 1–1.03)
UROBILINOGEN FLD QL: 0.2 MG/DL — SIGNIFICANT CHANGE UP (ref 0.2–1)
WBC # BLD: 13.22 K/UL — HIGH (ref 3.8–10.5)
WBC # FLD AUTO: 13.22 K/UL — HIGH (ref 3.8–10.5)

## 2024-10-16 PROCEDURE — 74177 CT ABD & PELVIS W/CONTRAST: CPT | Mod: MC

## 2024-10-16 PROCEDURE — 36415 COLL VENOUS BLD VENIPUNCTURE: CPT

## 2024-10-16 PROCEDURE — 83690 ASSAY OF LIPASE: CPT

## 2024-10-16 PROCEDURE — 80053 COMPREHEN METABOLIC PANEL: CPT

## 2024-10-16 PROCEDURE — 74177 CT ABD & PELVIS W/CONTRAST: CPT | Mod: 26,MC

## 2024-10-16 PROCEDURE — 81003 URINALYSIS AUTO W/O SCOPE: CPT

## 2024-10-16 PROCEDURE — 96374 THER/PROPH/DIAG INJ IV PUSH: CPT

## 2024-10-16 PROCEDURE — 83605 ASSAY OF LACTIC ACID: CPT

## 2024-10-16 PROCEDURE — 99284 EMERGENCY DEPT VISIT MOD MDM: CPT | Mod: 25

## 2024-10-16 PROCEDURE — 99285 EMERGENCY DEPT VISIT HI MDM: CPT

## 2024-10-16 PROCEDURE — 93005 ELECTROCARDIOGRAM TRACING: CPT

## 2024-10-16 PROCEDURE — 85025 COMPLETE CBC W/AUTO DIFF WBC: CPT

## 2024-10-16 RX ORDER — ONDANSETRON HCL/PF 4 MG/2 ML
4 VIAL (ML) INJECTION ONCE
Refills: 0 | Status: COMPLETED | OUTPATIENT
Start: 2024-10-16 | End: 2024-10-16

## 2024-10-16 RX ORDER — SODIUM CHLORIDE IRRIG SOLUTION 0.9 %
2000 SOLUTION, IRRIGATION IRRIGATION ONCE
Refills: 0 | Status: COMPLETED | OUTPATIENT
Start: 2024-10-16 | End: 2024-10-16

## 2024-10-16 RX ADMIN — Medication 4 MILLIGRAM(S): at 13:02

## 2024-10-16 RX ADMIN — Medication 2000 MILLILITER(S): at 13:02

## 2024-10-16 NOTE — ED ADULT NURSE NOTE - NSFALLRISKINTERV_ED_ALL_ED

## 2024-10-16 NOTE — ED PROVIDER NOTE - OBJECTIVE STATEMENT
Ms. Granados is an 84F with a PMHx hemophilia, fibromyalgia, esophagitis, s/p ventral hernia repair (2023) and cholecystectomy (+20 yrs ago), presenting for evaluation of dizziness and weakness of 3 day's duration. She notes on Sunday she felt poor and went to urgent care, where she was told her blood pressure was low. She received no intervention and returned home. On Monday, she developed diffuse abdominal pain that comes and goes as well as soft bowel movements, non-bloody, and frequent urination, non-bloody, non-odorous, non-painful. She reports nausea, no vomiting. She reports shortness of breath, no chest pain, no fever/chills. Ms. Granados is an 84F with a PMHx hemophilia c, fibromyalgia, esophagitis, s/p ventral hernia repair (2023) and cholecystectomy (+20 yrs ago), presenting for evaluation of dizziness and weakness of 3 day's duration. She notes on Sunday she felt poor and went to urgent care, where she was told her blood pressure was low. She received no intervention and returned home. On Monday, she developed diffuse abdominal pain that comes and goes as well as soft bowel movements, non-bloody, and frequent urination, non-bloody, non-odorous, non-painful. She reports nausea, no vomiting. She reports shortness of breath, no chest pain, no fever/chills.

## 2024-10-16 NOTE — ED PROVIDER NOTE - ATTENDING CONTRIBUTION TO CARE
Patient seen and evaluated with medical student.  Chart reviewed and edited by myself to accurately represent my findings and thoughts.

## 2024-10-16 NOTE — ED PROVIDER NOTE - NSFOLLOWUPINSTRUCTIONS_ED_ALL_ED_FT
Thank you for choosing [facility] for your healthcare.    You were seen in the Emergency Department for lower abdominal pain nauseousness and weakness.  In the emergency department you had blood and urine testing along with a CAT scan of your abdomen pelvis.  On the CAT scan we noted bilateral cystic masses in the areas that were your ovaries should be which is not a normal finding at your age.  We recommend very close follow-up with OB/GYN to obtain an MRI of your pelvis to further evaluate what these masses are.  We are including the information for our OB/GYN offices in this paperwork who you can follow-up with.  Our referral coordinator will also contact you to try and help get you a follow-up appointment with OB/GYN.  Please return to the emergency department if you are having severe uncontrollable pain, uncontrollable nauseousness or vomiting, if you cannot walk or for any other concerning or emergent medical issues.    Mary por elegir [facility] para kurtz atención médica.    Lo atendieron en el Departamento de Emergencias por dolor abdominal bajo, náuseas y debilidad.  En el departamento de emergencias le realizaron análisis de willie y orina junto con tenzin tomografía computarizada de kurtz abdomen pelvis.  En la tomografía computarizada notamos masas quísticas bilaterales en las áreas donde deberían estar indio ovarios, lo cual no es un hallazgo normal a kurtz edad.  Recomendamos un seguimiento muy estrecho con un obstetra/ginecólogo para obtener tenzin resonancia magnética de kurtz pelvis para evaluar más a fondo cuáles son estas masas.  En esta documentación incluimos la información de nuestras oficinas de obstetricia y ginecología con quienes puede realizar un seguimiento.  Nuestro coordinador de referencias también se comunicará con usted para intentar ayudarle a conseguir tenzin vasquez de seguimiento con un obstetra/ginecólogo.  Regrese al departamento de emergencias si tiene un dolor intenso e incontrolable, náuseas o vómitos incontrolables, si no puede caminar o por cualquier otro problema médico emergente o preocupante.

## 2024-10-16 NOTE — ED PROVIDER NOTE - PROGRESS NOTE DETAILS
Patient reporting feeling improved.  CT findings discussed with patient and offered admission for further imaging and workup versus outpatient follow-up with GYN and she requested to follow-up as an outpatient.  She does not currently have a GYN so we will give rapid referral and information for GYN offices.

## 2024-10-16 NOTE — ED PROVIDER NOTE - CLINICAL SUMMARY MEDICAL DECISION MAKING FREE TEXT BOX
Ms. Granados is an 84F with a PMHx hemophilia, fibromyalgia, esophagitis, s/p ventral hernia repair (2023) and cholecystectomy (+20 yrs ago), presenting for evaluation of dizziness and weakness of 3 day's duration. She reports episodic abdominal pain, soft BM, frequent urination, and nausea. She reports shortness of breath. Vital signs are normal. Neurological exam wnl. Abdominal exam soft, non-tender, distended with tympany to percussion. Differential diagnosis includes infectious process vs dehydration. EKG unremarkable. Will order CBC, CMP, UA, CT A/P. Will order zofran for nausea. Ms. Granados is an 84F with a PMHx hemophilia, fibromyalgia, esophagitis, s/p ventral hernia repair (2023) and cholecystectomy (+20 yrs ago), presenting for evaluation of dizziness and weakness of 3 day's duration. She reports episodic abdominal pain, soft BM, frequent urination, and nausea. She reports shortness of breath. Vital signs are normal. Neurological exam wnl. Abdominal exam soft, non-tender, distended with tympany to percussion. Differential diagnosis includes infectious process, possible abdominal surgical pathology, suspect also underlying dehydration from decreased appetite. EKG unremarkable and story not suspicious for cardiac etiology of symptoms. Will order CBC, CMP, lipase, lactate, UA, CT A/P. Will order zofran for nausea and intravenous fluids.

## 2024-10-16 NOTE — ED PROVIDER NOTE - PATIENT PORTAL LINK FT
You can access the FollowMyHealth Patient Portal offered by [facility] by registering at the following website: [email or web address]. By joining [facility]’s FollowMyHealth portal, you will also be able to view your health information using other applications (apps) compatible with our system.

## 2024-10-16 NOTE — ED PROVIDER NOTE - PHYSICAL EXAMINATION
General - appears uncomfortable in bed  HEENT - NC/AT  Neuro - CN II-XII intact  Card - RRR  Pulm - episodic increased work of breathing during exam  Abd - soft, distended with tympany to percussion, non-tender. No CVA tenderness  Skin - warm, dry General - appears uncomfortable in bed but nontoxic  HEENT - NC/AT, MMM  Neuro - CN II-XII intact  Card - RRR S1/S2  Pulm - lungs clear, no respiratory distress  Abd - soft, distended with tympany to percussion, non-tender. No CVA tenderness  Skin - warm, dry

## 2024-10-30 ENCOUNTER — APPOINTMENT (OUTPATIENT)
Dept: OBGYN | Facility: CLINIC | Age: 84
End: 2024-10-30
Payer: MEDICARE

## 2024-10-30 VITALS
HEART RATE: 87 BPM | BODY MASS INDEX: 29.84 KG/M2 | HEIGHT: 59 IN | SYSTOLIC BLOOD PRESSURE: 143 MMHG | WEIGHT: 148 LBS | DIASTOLIC BLOOD PRESSURE: 80 MMHG | TEMPERATURE: 98.1 F | OXYGEN SATURATION: 97 %

## 2024-10-30 DIAGNOSIS — R10.2 PELVIC AND PERINEAL PAIN: ICD-10-CM

## 2024-10-30 DIAGNOSIS — R06.02 SHORTNESS OF BREATH: ICD-10-CM

## 2024-10-30 DIAGNOSIS — N83.202 UNSPECIFIED OVARIAN CYST, RIGHT SIDE: ICD-10-CM

## 2024-10-30 DIAGNOSIS — R52 PAIN, UNSPECIFIED: ICD-10-CM

## 2024-10-30 DIAGNOSIS — N83.201 UNSPECIFIED OVARIAN CYST, RIGHT SIDE: ICD-10-CM

## 2024-10-30 PROCEDURE — 99459 PELVIC EXAMINATION: CPT

## 2024-10-30 PROCEDURE — 99203 OFFICE O/P NEW LOW 30 MIN: CPT

## 2024-10-30 RX ORDER — CHROMIUM 200 MCG
TABLET ORAL
Refills: 0 | Status: ACTIVE | COMMUNITY

## 2024-10-31 LAB
APPEARANCE: CLEAR
BACTERIA: NEGATIVE /HPF
BILIRUBIN URINE: NEGATIVE
BLOOD URINE: NEGATIVE
CANCER AG125 SERPL-ACNC: 19 U/ML
CAST: 0 /LPF
CEA SERPL-MCNC: 4.1 NG/ML
COLOR: YELLOW
EPITHELIAL CELLS: 1 /HPF
GLUCOSE QUALITATIVE U: NEGATIVE MG/DL
KETONES URINE: NEGATIVE MG/DL
LEUKOCYTE ESTERASE URINE: ABNORMAL
MICROSCOPIC-UA: NORMAL
NITRITE URINE: NEGATIVE
PH URINE: 6.5
PROTEIN URINE: NEGATIVE MG/DL
RED BLOOD CELLS URINE: 0 /HPF
SPECIFIC GRAVITY URINE: 1.01
UROBILINOGEN URINE: 0.2 MG/DL
WHITE BLOOD CELLS URINE: 1 /HPF

## 2024-11-01 LAB
BACTERIA UR CULT: NORMAL
BV BACTERIA RRNA VAG QL NAA+PROBE: NOT DETECTED
C GLABRATA RNA VAG QL NAA+PROBE: NOT DETECTED
CANDIDA RRNA VAG QL PROBE: NOT DETECTED
T VAGINALIS RRNA SPEC QL NAA+PROBE: NOT DETECTED

## 2024-11-04 ENCOUNTER — APPOINTMENT (OUTPATIENT)
Dept: ULTRASOUND IMAGING | Facility: CLINIC | Age: 84
End: 2024-11-04
Payer: MEDICARE

## 2024-11-04 PROCEDURE — 76856 US EXAM PELVIC COMPLETE: CPT

## 2024-11-04 PROCEDURE — 76830 TRANSVAGINAL US NON-OB: CPT

## 2024-11-25 ENCOUNTER — APPOINTMENT (OUTPATIENT)
Dept: GYNECOLOGIC ONCOLOGY | Facility: CLINIC | Age: 84
End: 2024-11-25
Payer: MEDICARE

## 2024-11-25 ENCOUNTER — NON-APPOINTMENT (OUTPATIENT)
Age: 84
End: 2024-11-25

## 2024-11-25 VITALS
DIASTOLIC BLOOD PRESSURE: 89 MMHG | OXYGEN SATURATION: 96 % | HEIGHT: 59 IN | HEART RATE: 83 BPM | SYSTOLIC BLOOD PRESSURE: 170 MMHG | TEMPERATURE: 98 F | WEIGHT: 149 LBS | BODY MASS INDEX: 30.04 KG/M2

## 2024-11-25 DIAGNOSIS — N83.201 UNSPECIFIED OVARIAN CYST, RIGHT SIDE: ICD-10-CM

## 2024-11-25 DIAGNOSIS — N83.202 UNSPECIFIED OVARIAN CYST, RIGHT SIDE: ICD-10-CM

## 2024-11-25 PROCEDURE — 99204 OFFICE O/P NEW MOD 45 MIN: CPT

## 2024-11-25 PROCEDURE — 99459 PELVIC EXAMINATION: CPT

## 2024-12-19 ENCOUNTER — OUTPATIENT (OUTPATIENT)
Dept: OUTPATIENT SERVICES | Facility: HOSPITAL | Age: 84
LOS: 1 days | End: 2024-12-19
Payer: COMMERCIAL

## 2024-12-19 VITALS
TEMPERATURE: 98 F | WEIGHT: 154.1 LBS | OXYGEN SATURATION: 98 % | HEIGHT: 62 IN | SYSTOLIC BLOOD PRESSURE: 145 MMHG | DIASTOLIC BLOOD PRESSURE: 82 MMHG | HEART RATE: 88 BPM | RESPIRATION RATE: 18 BRPM

## 2024-12-19 DIAGNOSIS — Z96.611 PRESENCE OF RIGHT ARTIFICIAL SHOULDER JOINT: Chronic | ICD-10-CM

## 2024-12-19 DIAGNOSIS — Z01.818 ENCOUNTER FOR OTHER PREPROCEDURAL EXAMINATION: ICD-10-CM

## 2024-12-19 DIAGNOSIS — Z87.39 PERSONAL HISTORY OF OTHER DISEASES OF THE MUSCULOSKELETAL SYSTEM AND CONNECTIVE TISSUE: Chronic | ICD-10-CM

## 2024-12-19 DIAGNOSIS — D49.4 NEOPLASM OF UNSPECIFIED BEHAVIOR OF BLADDER: Chronic | ICD-10-CM

## 2024-12-19 DIAGNOSIS — Z98.890 OTHER SPECIFIED POSTPROCEDURAL STATES: Chronic | ICD-10-CM

## 2024-12-19 DIAGNOSIS — D68.1 HEREDITARY FACTOR XI DEFICIENCY: ICD-10-CM

## 2024-12-19 DIAGNOSIS — G47.33 OBSTRUCTIVE SLEEP APNEA (ADULT) (PEDIATRIC): ICD-10-CM

## 2024-12-19 DIAGNOSIS — Z90.710 ACQUIRED ABSENCE OF BOTH CERVIX AND UTERUS: Chronic | ICD-10-CM

## 2024-12-19 DIAGNOSIS — Z96.653 PRESENCE OF ARTIFICIAL KNEE JOINT, BILATERAL: Chronic | ICD-10-CM

## 2024-12-19 DIAGNOSIS — N83.201 UNSPECIFIED OVARIAN CYST, RIGHT SIDE: ICD-10-CM

## 2024-12-19 DIAGNOSIS — K21.9 GASTRO-ESOPHAGEAL REFLUX DISEASE WITHOUT ESOPHAGITIS: ICD-10-CM

## 2024-12-19 DIAGNOSIS — N83.209 UNSPECIFIED OVARIAN CYST, UNSPECIFIED SIDE: ICD-10-CM

## 2024-12-19 DIAGNOSIS — Z98.41 CATARACT EXTRACTION STATUS, RIGHT EYE: Chronic | ICD-10-CM

## 2024-12-19 DIAGNOSIS — M79.7 FIBROMYALGIA: ICD-10-CM

## 2024-12-19 DIAGNOSIS — Z98.49 CATARACT EXTRACTION STATUS, UNSPECIFIED EYE: Chronic | ICD-10-CM

## 2024-12-19 DIAGNOSIS — Z90.49 ACQUIRED ABSENCE OF OTHER SPECIFIED PARTS OF DIGESTIVE TRACT: Chronic | ICD-10-CM

## 2024-12-19 PROBLEM — Z86.19 HISTORY OF CLOSTRIDIOIDES DIFFICILE COLITIS: Status: RESOLVED | Noted: 2024-12-19 | Resolved: 2024-12-19

## 2024-12-19 PROBLEM — Z87.19 HISTORY OF ESOPHAGITIS: Status: RESOLVED | Noted: 2024-12-19 | Resolved: 2024-12-19

## 2024-12-19 PROBLEM — Z80.9 FAMILY HISTORY OF MALIGNANT NEOPLASM: Status: ACTIVE | Noted: 2024-12-19

## 2024-12-19 LAB — BLD GP AB SCN SERPL QL: SIGNIFICANT CHANGE UP

## 2024-12-19 NOTE — H&P PST ADULT - ATTENDING COMMENTS
plan for EUA, Robotic Assisted, BSO, possible staging, all indicated procedures  discussed risks including bleeding, infection, injury to bowel/bladder/vessels/nerves/ureters, risks of blood transfusion, reoperation, ICU admission  all questions answered

## 2024-12-19 NOTE — H&P PST ADULT - ASSESSMENT
This is a 84 year old female with PMH of severe CALVIN on CPAP, Hemophilia C, esophagitis, GERD, fibromyalgia who presents with ovarian cyst. Pt is scheduled for examination under anesthesia, robotic assisted bilateral salpingo-oophorectomy, possible staging (history of hernia repair with mesh) on 01/07/2025.

## 2024-12-19 NOTE — H&P PST ADULT - NSICDXPASTSURGICALHX_GEN_ALL_CORE_FT
PAST SURGICAL HISTORY:  Bladder tumor surgically removed; benign    H/O total knee replacement, bilateral     H/O varicose vein stripping     History of cataract surgery     History of cholecystectomy     History of osteoarthritis     S/p bilateral shoulder joint replacement     S/P cataract extraction and insertion of intraocular lens, right     S/P hysterectomy      PAST SURGICAL HISTORY:  Bladder tumor surgically removed; benign    H/O total knee replacement, bilateral     H/O varicose vein stripping     History of carpal tunnel surgery of right wrist     History of cataract surgery     History of cholecystectomy     History of osteoarthritis     History of umbilical hernia repair     History of ventral hernia repair     S/p bilateral shoulder joint replacement     S/P cataract extraction and insertion of intraocular lens, right     S/P hysterectomy

## 2024-12-19 NOTE — H&P PST ADULT - NSICDXFAMILYHX_GEN_ALL_CORE_FT
FAMILY HISTORY:  No pertinent family history in first degree relatives     FAMILY HISTORY:  Mother  Still living? No  Family history of arthritis, Age at diagnosis: Age Unknown

## 2024-12-19 NOTE — H&P PST ADULT - NSICDXPROCEDURE_GEN_ALL_CORE_FT
PROCEDURES:  Pelvic examination under anesthesia 19-Dec-2024 12:58:57  Laura Price  Robot-assisted bilateral salpingo-oophorectomy (BSO) 19-Dec-2024 12:59:36  Laura Price  Robot-assisted salpingo-oophorectomy with staging of neoplasm 19-Dec-2024 13:01:00  Laura rPice

## 2024-12-19 NOTE — H&P PST ADULT - DOES PATIENT HAVE ADVANCE DIRECTIVE
Pt's  and daughter will make decisions in case of emergency/No Pt's  Elbert rGanados [phone] and daughter nell granados [phone]  will make decisions in case of emergency/No

## 2024-12-19 NOTE — H&P PST ADULT - PROBLEM SELECTOR PLAN 3
Pt with severe CALVIN on CPAP.  Pulmonary precautions to be maintained perioperatively.   Instructed pt to bring CPAP machine to hospital on day of surgery to facilitate reinstitution of treatment after surgery.  Discussed with pt PACU 3 hour plan of care postoperatively. Pt agreed.  Sleep study report obtained and attached to chart.

## 2024-12-19 NOTE — H&P PST ADULT - PROBLEM SELECTOR PLAN 2
Pt will h/o hemophilia C.  Pt to follow-up with hematology for optimization.  Bleeding prevention to be maintained.

## 2024-12-19 NOTE — H&P PST ADULT - PROBLEM SELECTOR PLAN 1
Pt is scheduled for examination under anesthesia, robotic assisted bilateral salpingo-oophorectomy, possible staging (history of hernia repair with mesh) on 01/07/2025.  Preoperative instructions discussed with pt and given to pt.   Instructed pt not to eat or drink anything after midnight the night before the surgery, to avoid NSAIDs such as Ibuprofen, Motrin, Aleve, Advil, Naproxen before surgery, to take Tylenol if needed for pain, to report if she has been exposed to any one with any contagious diseases including Covid-19 or if she is exhibiting any symptoms of COVID-19. Instructed about use of Chlorhexidine 4% soap for 3 days before surgery including the morning of the surgery to prevent infection. Verbalized understanding of instructions given.

## 2024-12-19 NOTE — H&P PST ADULT - PROBLEM SELECTOR PLAN 5
Pt instructed to avoid NSAIDs 1 week before surgery, to continue meds, to take Tylenol as needed for pain.   Pt to follow-up with provider for management.

## 2024-12-19 NOTE — H&P PST ADULT - NSICDXPASTMEDICALHX_GEN_ALL_CORE_FT
PAST MEDICAL HISTORY:  Bilateral cataracts     Diverticulitis     Fibromyalgia     GERD (gastroesophageal reflux disease)     History of stomach ulcers     Kidney stone treated with lithotripsy    CALVIN on CPAP      PAST MEDICAL HISTORY:  Bilateral cataracts     Diverticulitis     Fibromyalgia     GERD (gastroesophageal reflux disease)     Hemophilia C     History of stomach ulcers     Kidney stone treated with lithotripsy    CALVIN on CPAP

## 2024-12-19 NOTE — H&P PST ADULT - PROBLEM SELECTOR PLAN 4
Instructed to continue pantoprazole and take it with sips of water on day of surgery. Follow-up with provider for management.

## 2024-12-19 NOTE — H&P PST ADULT - HISTORY OF PRESENT ILLNESS
This is a 84 year old female with PMH of severe CALVIN on CPAP, Hemophilia C, esophagitis, GERD, fibromyalgia who presents with c/o intermittent pelvic pain due to ovarian cysts. Pt is scheduled for examination under anesthesia, robotic assisted bilateral salpingo-oophorectomy, possible staging (history of hernia repair with mesh) on 01/07/2025.

## 2024-12-20 PROCEDURE — 36415 COLL VENOUS BLD VENIPUNCTURE: CPT

## 2024-12-20 PROCEDURE — 86850 RBC ANTIBODY SCREEN: CPT

## 2024-12-20 PROCEDURE — G0463: CPT

## 2024-12-20 PROCEDURE — 86900 BLOOD TYPING SEROLOGIC ABO: CPT

## 2024-12-20 PROCEDURE — 86901 BLOOD TYPING SEROLOGIC RH(D): CPT

## 2024-12-24 ENCOUNTER — APPOINTMENT (OUTPATIENT)
Dept: GYNECOLOGIC ONCOLOGY | Facility: HOSPITAL | Age: 84
End: 2024-12-24

## 2024-12-24 PROBLEM — R52 BODY ACHES: Status: RESOLVED | Noted: 2024-10-30 | Resolved: 2024-12-24

## 2025-01-07 ENCOUNTER — RESULT REVIEW (OUTPATIENT)
Age: 85
End: 2025-01-07

## 2025-01-07 ENCOUNTER — APPOINTMENT (OUTPATIENT)
Dept: GYNECOLOGIC ONCOLOGY | Facility: HOSPITAL | Age: 85
End: 2025-01-07

## 2025-01-07 ENCOUNTER — OUTPATIENT (OUTPATIENT)
Dept: OUTPATIENT SERVICES | Facility: HOSPITAL | Age: 85
LOS: 1 days | End: 2025-01-07
Payer: COMMERCIAL

## 2025-01-07 ENCOUNTER — TRANSCRIPTION ENCOUNTER (OUTPATIENT)
Age: 85
End: 2025-01-07

## 2025-01-07 VITALS
OXYGEN SATURATION: 97 % | TEMPERATURE: 98 F | HEIGHT: 62 IN | SYSTOLIC BLOOD PRESSURE: 132 MMHG | HEART RATE: 89 BPM | WEIGHT: 154.1 LBS | RESPIRATION RATE: 16 BRPM | DIASTOLIC BLOOD PRESSURE: 71 MMHG

## 2025-01-07 VITALS
HEART RATE: 81 BPM | TEMPERATURE: 98 F | RESPIRATION RATE: 14 BRPM | SYSTOLIC BLOOD PRESSURE: 114 MMHG | DIASTOLIC BLOOD PRESSURE: 66 MMHG | OXYGEN SATURATION: 100 %

## 2025-01-07 DIAGNOSIS — Z01.818 ENCOUNTER FOR OTHER PREPROCEDURAL EXAMINATION: ICD-10-CM

## 2025-01-07 DIAGNOSIS — Z98.890 OTHER SPECIFIED POSTPROCEDURAL STATES: Chronic | ICD-10-CM

## 2025-01-07 DIAGNOSIS — N83.201 UNSPECIFIED OVARIAN CYST, RIGHT SIDE: ICD-10-CM

## 2025-01-07 DIAGNOSIS — Z90.710 ACQUIRED ABSENCE OF BOTH CERVIX AND UTERUS: Chronic | ICD-10-CM

## 2025-01-07 DIAGNOSIS — Z96.653 PRESENCE OF ARTIFICIAL KNEE JOINT, BILATERAL: Chronic | ICD-10-CM

## 2025-01-07 DIAGNOSIS — Z90.49 ACQUIRED ABSENCE OF OTHER SPECIFIED PARTS OF DIGESTIVE TRACT: Chronic | ICD-10-CM

## 2025-01-07 DIAGNOSIS — Z98.49 CATARACT EXTRACTION STATUS, UNSPECIFIED EYE: Chronic | ICD-10-CM

## 2025-01-07 DIAGNOSIS — Z87.39 PERSONAL HISTORY OF OTHER DISEASES OF THE MUSCULOSKELETAL SYSTEM AND CONNECTIVE TISSUE: Chronic | ICD-10-CM

## 2025-01-07 DIAGNOSIS — D49.4 NEOPLASM OF UNSPECIFIED BEHAVIOR OF BLADDER: Chronic | ICD-10-CM

## 2025-01-07 DIAGNOSIS — Z98.41 CATARACT EXTRACTION STATUS, RIGHT EYE: Chronic | ICD-10-CM

## 2025-01-07 DIAGNOSIS — Z96.611 PRESENCE OF RIGHT ARTIFICIAL SHOULDER JOINT: Chronic | ICD-10-CM

## 2025-01-07 LAB — BLD GP AB SCN SERPL QL: SIGNIFICANT CHANGE UP

## 2025-01-07 PROCEDURE — 58661 LAPAROSCOPY REMOVE ADNEXA: CPT | Mod: 50

## 2025-01-07 PROCEDURE — S2900: CPT

## 2025-01-07 PROCEDURE — 86850 RBC ANTIBODY SCREEN: CPT

## 2025-01-07 PROCEDURE — S2900 ROBOTIC SURGICAL SYSTEM: CPT | Mod: NC

## 2025-01-07 PROCEDURE — 58661 LAPAROSCOPY REMOVE ADNEXA: CPT | Mod: AS,50,22

## 2025-01-07 PROCEDURE — 86900 BLOOD TYPING SEROLOGIC ABO: CPT

## 2025-01-07 PROCEDURE — 88307 TISSUE EXAM BY PATHOLOGIST: CPT | Mod: 26

## 2025-01-07 PROCEDURE — 36415 COLL VENOUS BLD VENIPUNCTURE: CPT

## 2025-01-07 PROCEDURE — 86901 BLOOD TYPING SEROLOGIC RH(D): CPT

## 2025-01-07 PROCEDURE — 88307 TISSUE EXAM BY PATHOLOGIST: CPT

## 2025-01-07 PROCEDURE — 58661 LAPAROSCOPY REMOVE ADNEXA: CPT | Mod: 50,22

## 2025-01-07 DEVICE — INTERCEED 3 X 4": Type: IMPLANTABLE DEVICE | Status: FUNCTIONAL

## 2025-01-07 DEVICE — ARISTA 3GR: Type: IMPLANTABLE DEVICE | Status: FUNCTIONAL

## 2025-01-07 RX ORDER — CHONDROITIN/COLLAGEN/HYALURON 500 MG
1 CAPSULE ORAL
Refills: 0 | DISCHARGE

## 2025-01-07 RX ORDER — SODIUM CHLORIDE 9 MG/ML
1000 INJECTION, SOLUTION INTRAVENOUS
Refills: 0 | Status: DISCONTINUED | OUTPATIENT
Start: 2025-01-07 | End: 2025-01-07

## 2025-01-07 RX ORDER — HYDROMORPHONE HCL 4 MG
0.5 TABLET ORAL
Refills: 0 | Status: DISCONTINUED | OUTPATIENT
Start: 2025-01-07 | End: 2025-01-07

## 2025-01-07 RX ORDER — FENTANYL 75 UG/H
25 PATCH, EXTENDED RELEASE TRANSDERMAL
Refills: 0 | Status: DISCONTINUED | OUTPATIENT
Start: 2025-01-07 | End: 2025-01-07

## 2025-01-07 RX ORDER — ACETAMINOPHEN 80 MG/.8ML
1000 SOLUTION/ DROPS ORAL ONCE
Refills: 0 | Status: DISCONTINUED | OUTPATIENT
Start: 2025-01-07 | End: 2025-01-07

## 2025-01-07 RX ORDER — ACETAMINOPHEN 500MG 500 MG/1
2 TABLET, COATED ORAL
Qty: 0 | Refills: 0 | DISCHARGE

## 2025-01-07 RX ORDER — OXYCODONE HCL 15 MG
1 TABLET ORAL
Qty: 5 | Refills: 0
Start: 2025-01-07

## 2025-01-07 RX ORDER — SODIUM CHLORIDE 9 MG/ML
3 INJECTION, SOLUTION INTRAMUSCULAR; INTRAVENOUS; SUBCUTANEOUS EVERY 8 HOURS
Refills: 0 | Status: DISCONTINUED | OUTPATIENT
Start: 2025-01-07 | End: 2025-01-07

## 2025-01-07 NOTE — BRIEF OPERATIVE NOTE - NSICDXBRIEFPROCEDURE_GEN_ALL_CORE_FT
PROCEDURES:  Exam under anesthesia, pelvic 07-Jan-2025 11:32:49  Lucy Youngblood  Robot-assisted bilateral salpingo-oophorectomy 07-Jan-2025 11:33:03  Lucy Youngblood

## 2025-01-07 NOTE — ASU DISCHARGE PLAN (ADULT/PEDIATRIC) - FINANCIAL ASSISTANCE
[facility] provides services at a reduced cost to those who are determined to be eligible through [facility]’s financial assistance program. Information regarding [facility]’s financial assistance program can be found by going to [email or web address] or by calling [phone].

## 2025-01-07 NOTE — ASU PATIENT PROFILE, ADULT - NSICDXPASTMEDICALHX_GEN_ALL_CORE_FT
PAST MEDICAL HISTORY:  Bilateral cataracts     Diverticulitis     Fibromyalgia     GERD (gastroesophageal reflux disease)     Hemophilia C     History of stomach ulcers     Kidney stone treated with lithotripsy    CALVIN on CPAP

## 2025-01-07 NOTE — ASU PATIENT PROFILE, ADULT - NSICDXPASTSURGICALHX_GEN_ALL_CORE_FT
PAST SURGICAL HISTORY:  Bladder tumor surgically removed; benign    H/O total knee replacement, bilateral     H/O varicose vein stripping     History of carpal tunnel surgery of right wrist     History of cataract surgery     History of cholecystectomy     History of osteoarthritis     History of umbilical hernia repair     History of ventral hernia repair     S/p bilateral shoulder joint replacement     S/P cataract extraction and insertion of intraocular lens, right     S/P hysterectomy

## 2025-01-07 NOTE — ASU DISCHARGE PLAN (ADULT/PEDIATRIC) - CARE PROVIDER_API CALL
Kimberlee Ceballos  Gynecologic Oncology  [address] [place] [id number]  Phone: [phone]  Fax: [phone]  Follow Up Time:

## 2025-01-07 NOTE — BRIEF OPERATIVE NOTE - OPERATION/FINDINGS
EUA: Normal external genitalia. Intact vaginal cuff. Left adnexal non-mobile mass palpated.  LSC: Atraumatic entry site. Dense omental adhesions to anterior abdominal wall by umbilicus/site of hernia mesh. Bilateral ovaries encased in adhesions to pelvic sidewalls. Ureters identified bilaterally. Left ovary with 7cm multiloculated cyst that drained clear fluid after being punctured in endocatch bag. Right ovary with 4cm multiloculated cyst that drained clear fluid after being punctured in endocatch bag.

## 2025-01-07 NOTE — ASU PATIENT PROFILE, ADULT - DOES PATIENT HAVE ADVANCE DIRECTIVE
Pt's  Elbert Granados [phone] and daughter nell granados [phone]  will make decisions in case of emergency/No

## 2025-01-07 NOTE — ASU PATIENT PROFILE, ADULT - FALL HARM RISK - UNIVERSAL INTERVENTIONS
Bed in lowest position, wheels locked, appropriate side rails in place/Call bell, personal items and telephone in reach/Instruct patient to call for assistance before getting out of bed or chair/Non-slip footwear when patient is out of bed/[place] to call system/Physically safe environment - no spills, clutter or unnecessary equipment/Purposeful Proactive Rounding/Room/bathroom lighting operational, light cord in reach

## 2025-01-07 NOTE — CHART NOTE - NSCHARTNOTEFT_GEN_A_CORE
2 HOUR POST OP CHECK:     83 y/o F s/p Robotic Assisted Salpingo- ooporectomy with Lysis of Adhesions seen at bedside, in PACU.   Patient resting comfortably, offers no complaints at this time, pain controlled. Denies n/v, cp; sob; palpitations; or dizziness    T(C): 36.4 (01-07-25 @ 13:29), Max: 36.9 (01-07-25 @ 07:32)  HR: 81 (01-07-25 @ 13:29) (75 - 90)  BP: 114/66 (01-07-25 @ 13:29) (112/56 - 133/63)  RR: 14 (01-07-25 @ 13:29) (13 - 22)  SpO2: 100% (01-07-25 @ 13:29) (97% - 100%)      Physical Exam:  abd: soft/nt, fundus firm, dressing in place C/D/I  ext: venodynes in place; no calf pain    a/p  POD #0 s/p Robotic Assisted Salpingo-ooporectomy with Lysis of Adhesions  - cont close monitor   - cont post op care 2 HOUR POST OP CHECK:     85 y/o F s/p Robotic Assisted Salpingo- ooporectomy with Lysis of Adhesions seen at bedside, in PACU.   Patient resting comfortably, offers no complaints at this time, pain controlled. Denies n/v, cp; sob; palpitations; or dizziness    T(C): 36.4 (01-07-25 @ 13:29), Max: 36.9 (01-07-25 @ 07:32)  HR: 81 (01-07-25 @ 13:29) (75 - 90)  BP: 114/66 (01-07-25 @ 13:29) (112/56 - 133/63)  RR: 14 (01-07-25 @ 13:29) (13 - 22)  SpO2: 100% (01-07-25 @ 13:29) (97% - 100%)      Physical Exam:  abd: soft/nt, fundus firm, dressing in place C/D/I  ext: venodynes in place; no calf pain    a/p  POD #0 s/p Robotic Assisted Salpingo-ooporectomy with Lysis of Adhesions  - cont close monitor   - cont post op care  dc home

## 2025-01-07 NOTE — ASU DISCHARGE PLAN (ADULT/PEDIATRIC) - ASU DC SPECIAL INSTRUCTIONSFT
Postoperative Instructions      Pain control     For pain control, take the followin. Tylenol 1000 four times a day  2. Add oxycodone as needed for severe pain not managed well by Tylenol. A prescription has been sent to your pharmacy on file.     Tylenol can be obtained over the counter.    Postoperative restrictions   Do not drive or make important decisions for 24 hours after anesthesia. You may feel drowsy for 24 hours and should have a responsible adult with you during that time. Nothing in the vagina (tampons, sexual intercourse), No tub baths, pools or hot tubs for 6 weeks (showers are ok!). No lifting anything heavier than 15 lbs, no strenuous exercise for 6 weeks after surgery. Do not pull or cut any stitches that you see around your incision.         Vaginal bleeding   Spotting per vagina is normal in first few days after surgery. If you are soaking 1 pad per hour, that is not normal and you should notify your doctor's office and seek medical attention right away.        Signs of Infection  Some postoperative pain and discomfort is normal. However, if you experience any of the following, you may be developing an infection and should be seen by your doctor: pain that does not get better with the oral medications listed above, fever greater than 100.4F, foul smelling discharge coming from the surgical site, nausea and vomiting that does not stop (especially if you are unable to tolerate oral intake), or inability to urinate. If you experience any of these symptoms, call your doctor's office to be seen right away.    Follow Up  Follow up with your provider as scheduled on . The results from the procedure will be discussed with you at that time.

## 2025-01-11 ENCOUNTER — NON-APPOINTMENT (OUTPATIENT)
Age: 85
End: 2025-01-11

## 2025-01-13 LAB — SURGICAL PATHOLOGY STUDY: SIGNIFICANT CHANGE UP

## 2025-01-21 ENCOUNTER — NON-APPOINTMENT (OUTPATIENT)
Age: 85
End: 2025-01-21

## 2025-01-26 PROBLEM — Z48.89 POSTOPERATIVE VISIT: Status: ACTIVE | Noted: 2025-01-26

## 2025-01-27 ENCOUNTER — APPOINTMENT (OUTPATIENT)
Dept: GYNECOLOGIC ONCOLOGY | Facility: CLINIC | Age: 85
End: 2025-01-27

## 2025-01-29 PROBLEM — D68.1 HEREDITARY FACTOR XI DEFICIENCY: Chronic | Status: ACTIVE | Noted: 2024-12-19

## 2025-01-31 ENCOUNTER — APPOINTMENT (OUTPATIENT)
Dept: GYNECOLOGIC ONCOLOGY | Facility: CLINIC | Age: 85
End: 2025-01-31

## 2025-01-31 ENCOUNTER — NON-APPOINTMENT (OUTPATIENT)
Age: 85
End: 2025-01-31

## 2025-01-31 VITALS
OXYGEN SATURATION: 96 % | RESPIRATION RATE: 16 BRPM | WEIGHT: 149 LBS | SYSTOLIC BLOOD PRESSURE: 126 MMHG | DIASTOLIC BLOOD PRESSURE: 77 MMHG | HEART RATE: 81 BPM | HEIGHT: 59 IN | BODY MASS INDEX: 30.04 KG/M2

## 2025-01-31 DIAGNOSIS — Z48.89 ENCOUNTER FOR OTHER SPECIFIED SURGICAL AFTERCARE: ICD-10-CM

## 2025-01-31 DIAGNOSIS — R10.9 UNSPECIFIED ABDOMINAL PAIN: ICD-10-CM

## 2025-01-31 PROCEDURE — 99213 OFFICE O/P EST LOW 20 MIN: CPT | Mod: 25

## 2025-02-22 ENCOUNTER — APPOINTMENT (OUTPATIENT)
Dept: ULTRASOUND IMAGING | Facility: CLINIC | Age: 85
End: 2025-02-22
Payer: MEDICARE

## 2025-02-22 PROCEDURE — 76700 US EXAM ABDOM COMPLETE: CPT

## 2025-02-22 PROCEDURE — 76830 TRANSVAGINAL US NON-OB: CPT

## 2025-02-22 PROCEDURE — 76856 US EXAM PELVIC COMPLETE: CPT | Mod: 59

## 2025-02-22 PROCEDURE — 76856 US EXAM PELVIC COMPLETE: CPT

## 2025-03-06 ENCOUNTER — NON-APPOINTMENT (OUTPATIENT)
Age: 85
End: 2025-03-06

## 2025-03-10 ENCOUNTER — APPOINTMENT (OUTPATIENT)
Dept: GYNECOLOGIC ONCOLOGY | Facility: CLINIC | Age: 85
End: 2025-03-10
Payer: MEDICARE

## 2025-03-10 VITALS
DIASTOLIC BLOOD PRESSURE: 62 MMHG | OXYGEN SATURATION: 100 % | WEIGHT: 149 LBS | BODY MASS INDEX: 30.04 KG/M2 | RESPIRATION RATE: 16 BRPM | HEART RATE: 76 BPM | HEIGHT: 59 IN | SYSTOLIC BLOOD PRESSURE: 111 MMHG

## 2025-03-10 DIAGNOSIS — Z48.89 ENCOUNTER FOR OTHER SPECIFIED SURGICAL AFTERCARE: ICD-10-CM

## 2025-03-10 PROCEDURE — 99212 OFFICE O/P EST SF 10 MIN: CPT | Mod: 25

## 2025-05-14 NOTE — H&P ADULT - GASTROINTESTINAL COMMENTS
Virtual ICU Quality Rounds    Admit Date: 5/6/2025  Hospital Day: 8    ICU Day: 8d 2h    24H Vital Sign Range:  Temp:  [97.8 °F (36.6 °C)-98.5 °F (36.9 °C)]   Pulse:  []   Resp:  [16-51]   BP: (104-185)/()   SpO2:  [94 %-100 %]     VICU Surveillance Screening    Daily review of  line necessity(optional): Central line(s) in place    Central line type (optional): Dialysis (tunneled)    Central line indications : Poor IV access and DVT/infiltration UEs    LDA reconciliation : Yes                            mild diffuse tenderness to deep palpation
